# Patient Record
Sex: FEMALE | Race: WHITE | NOT HISPANIC OR LATINO | Employment: OTHER
[De-identification: names, ages, dates, MRNs, and addresses within clinical notes are randomized per-mention and may not be internally consistent; named-entity substitution may affect disease eponyms.]

---

## 2023-04-05 PROBLEM — B96.20 E. COLI UTI: Status: ACTIVE | Noted: 2021-11-05

## 2023-04-05 PROBLEM — D21.9 FIBROIDS: Status: ACTIVE | Noted: 2023-04-05

## 2023-04-05 PROBLEM — L40.9 PSORIASIS: Status: ACTIVE | Noted: 2017-08-23

## 2023-04-05 PROBLEM — E03.9 HYPOTHYROIDISM: Status: ACTIVE | Noted: 2023-04-05

## 2023-04-05 PROBLEM — J30.2 SEASONAL ALLERGIES: Status: ACTIVE | Noted: 2017-09-16

## 2023-04-05 PROBLEM — N39.0 E. COLI UTI: Status: ACTIVE | Noted: 2021-11-05

## 2023-04-05 PROBLEM — F33.3 SEVERE EPISODE OF RECURRENT MAJOR DEPRESSIVE DISORDER, WITH PSYCHOTIC FEATURES (H): Status: ACTIVE | Noted: 2022-05-03

## 2023-04-05 PROBLEM — G20.A1 PARKINSON'S DISEASE (H): Status: ACTIVE | Noted: 2022-09-19

## 2023-04-05 PROBLEM — D22.9 ATYPICAL NEVUS: Status: ACTIVE | Noted: 2023-04-05

## 2023-04-05 RX ORDER — BUPROPION HYDROCHLORIDE 300 MG/1
300 TABLET ORAL DAILY
COMMUNITY
Start: 2022-04-25 | End: 2023-11-28

## 2023-04-05 RX ORDER — LEVOTHYROXINE SODIUM 75 UG/1
1 TABLET ORAL DAILY
COMMUNITY
Start: 2022-09-30

## 2023-04-05 RX ORDER — LITHIUM CARBONATE 600 MG/1
600 CAPSULE ORAL AT BEDTIME
COMMUNITY
Start: 2022-03-24 | End: 2023-05-23

## 2023-04-05 RX ORDER — CARBIDOPA AND LEVODOPA 25; 100 MG/1; MG/1
TABLET ORAL
COMMUNITY
Start: 2022-08-25 | End: 2023-05-23

## 2023-04-06 ENCOUNTER — TRANSCRIBE ORDERS (OUTPATIENT)
Dept: OTHER | Age: 63
End: 2023-04-06

## 2023-04-06 DIAGNOSIS — G20.A1 PARKINSON'S DISEASE (H): Primary | ICD-10-CM

## 2023-04-20 NOTE — TELEPHONE ENCOUNTER
Action 4/20/23 MV 2.21pm   Action Taken 1) imaging request faxed to [facility]  2) records and imaging request faxed to [facility]     Action 5/10/23 MV 11.11am   Action Taken 1) [facility] images resolved in PACS ; records sent to scanning.  2) 2nd request faxed to [facility] for imaging     Action 5/16/23 MV 9.16am   Action Taken [facility] images resolved in PACS             RECORDS RECEIVED FROM: external   REASON FOR VISIT: PD   Date of Appt: 5/23/23   NOTES (FOR ALL VISITS) STATUS DETAILS   OFFICE NOTE from referring provider Care Everywhere Dr Bibi Yeh @ [facility]:  4/3/23   OFFICE NOTE from other specialist Received Livier MANZANARES @ [facility]:  4/12/23 1/12/23    Lexis MANZANARES @ [facility]:  3/8/23  12/28/22  9/28/22    Dr Negro Rust @ [facility]:  11/3/22    Dr Adelaida Arndt @ [facility]:  8/25/22   MEDICATION LIST Care Everywhere    IMAGING  (FOR ALL VISITS)     LANE SCAN PACS [facility]:  NM Brain 3/31/23   MRI (HEAD, NECK, SPINE) PACS [facility]:  MRI Brain 8/30/22  MRI Cervical Spine 8/30/22

## 2023-05-04 NOTE — PROGRESS NOTES
Diagnosis/Summary/Recommendations:    PATIENT: Fabiana Hu  63 year old female     : 1960    DEN: May 23, 2023    MRN: [id number]     [facility] [id number]     [phone] (H)   [phone] (M)     [email or web address]    Brandt hu spouse  [phone]    Referral from PCP   # possible Parkinson's; followed by neurology  She is currently at 1/2 tab daily and will follow-up with neurology.     Seen at [facility]    Assessment:  (G20) Parkinsonism, unspecified Parkinsonism type (H)  (primary encounter diagnosis)  No family history of parkinson     3/31/2023 dopamine scan (datscan) - not sure what medication was taking   Decreased radiotracer uptake in the caudate nuclei/putamina suspicious for a dopaminergic degenerative process such as Parkinson's disease.     Review of diagnosis    Parkinsonism  Diagnosed 2022  Presumed left side onset and has left arm now swinging     Avoidance of dopamine blockers   Not taking    Motor complication review   Has involuntary leg movements - that have begun since been on sinemet    Review of Impulse control disorders   Denies     Review of surgical or medication options   reviewed    Gait/Balance/Falls   Fell out bed  No falls otherwise    Exercise/Therapy performed/offered   Physical therapy for shoulder   Did big therapy at [facility]   Did some speech therapy at [facility]/Driving   [facility]  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 -   No changes in cognition  Nervous about driving     Mood   Depression  Anxiety  Diagnosed in [place] when was living there for 3  Years 1998  Diagnosed   Has incident of it then in  - not hospitalized   Had incident of it  - not hospitalized  Never suicidal   Tried a lot of antidepressant  Been on lithium for a long time   Brother Scar has depression and done well with lithium  Daughter diagnosed with bipolar  and is on lithium and doing okay  "- getting  this fall   Mood is stable  Sister is a nurse in [employer] [place]     Spouse is retired and did software development  He now plays the [employer]    Psychiatry -   [facility] - [facility] [place]/SLP      Hallucinations/delusions   No     Sleep   Sleep is \"horrible\"  Fall asleep okay   Has belching problem with tablets  Goes to bed around 1030pm   Wake up every 2 hours and has to go to the bathroom.  Nocturia every 2 hours - 3-4 times per night.   Talking in her sleep  Taking long acting melatonin   Has not been on melatonin very long  Not clear how long it has helped but it may have helped.   Sleep study was done at [facility] and diagnosed with  RBD  Had mild sleep apnea and tried cpap 5 years and not using this.   Has some creepy crawling feeling in bed when going to bed  Has some movement of legs to relieve symptoms  Not sure if there is PLMS - no leg jerking  Using gabapentin and melatonin for the dream enactment behavior    Bladder/Renal/Prostate/Gyn/Other  E coli uti  Urinary incontinence sees specialist  Wearing a pad - it has been really bad   Urinary urgency  No significant dribbling or stress urinary incontinence.   Urinary frequency at night  Small volumes  Empty out.   Has not been on any treatment.   Was given sample of gemtesa which she has not tried.   Has drinking a lot of water    GI/Constipation/GERD   Adenomas  Constipation has improved due to eating brown bread  Esophageal dysmotility and has not had motility study or an EGD  Has not had medications for this   h pylori diagnosed by Gi person and given antibiotic and tested afterwards  Using tums as needed       ENDO/Lipid/DM/Bone density/Thyroid  Hypothyroidism  Denies cholesterol or diabetes    Cardio/heart/Hyper or Hypotensive   AVM in right leg   Varicose veins  No blood pressure issue   No fainting or passing    Vision/Dry Eyes/Cataracts/Glaucoma/Macular   Wears glasses  Had cataract surgery bilateral "     Heme/Anticoagulation/Antiplatelet/Anemia/Other  Not taking aspirin    ENT/Resp  Seasonal allergies  Smell loss for a long time  Drooling in the past  Has not had covid     Skin/Cancer/Seborrhea/other  Atypical nevus  Psoriasis   Lesions removed but not clear if cancer    Musculoskeletal/Pain/Headache  Has back pain and going for physical therapy  Has done big therapy and developed shoulder pain - left shoulder  Having right shoulder pain and going for physical therapy  Has some back pain    Other:  Fibroids  Breast lumps/mass  Had biopsy 2012     Medications     730 8/830a 1130 430p 9p 930p 10p   Bupropion Wellbutrin XL 300mg 24hr  1        Carbidopa/levodopa Sinemet 25/100 1  1 1      Gabapentin neurontin 100mg       1   LEvothyroxine synthroid levothyroid 75mcg 1         Lithium eskalith 300mg     2     Melatonin 10mg extended release (10mg B6)      1                                                                                                                                                            Plan:    Pharmacy (MTM) consultation and medication management  Please call the scheduling number I@ [phone] to set up an appointment with pharmacists Yue Mahajan or Yancy Wright.     Denys Stahl    Josea was expensive and did not try  Has not been tried on mirabegron (myrbetriq) 25mg daily    Genetic testing  [email or web address]  PDgeneration -   [facility]ae     1. Would start with improving the neurogenic bladder issues - trial of mirabegron or other options    2. Sinemet dose in evening or during the night - using the short acting 1/2 tablet or trying long acting    3. Clean up timing of night time medication    4. Ongoing esophageal dysmotility/swallowing problems. - EGD or motility    5. Early discussion about deep brain stimulation (DBS) because of her dyskinesias in her legs that are occurring early in the course of disease.     6. Trial of amantadine and long acting amantadine  (Gocovri)    7. Neuropsychological baseline evaluation    8. Pharmacy consultation as noted above.     9. Genetics consultation with Leehyun Lewis     10. Return to see Radha Keene NP to get baseline motor evaluation.       Coding statement:   Medical Decision Making:  #  Chronic progressive medical conditions addressed  - see above --   Review and/or interpretation of unique test or documentation from a provider outside of neurology no   Independent historian provided additional details  yes  Prescription drug management and review of potential side effects and/or monitoring for side effects  -- see above ---  Health impacted by social determinants of health  no    I have reviewed the note as documented above.  This accurately captures the substance of my conversation with the patient and total time spent preparing for visit, executing visit and completing visit on the day of the visit:  70 minutes.  The portion of this total time included face to face time 62 minutes     Ramon Weston MD     ______________________________________    Last visit date and details:     Shameka aly     MV    4/20/23  2:22 PM  Note  Action 4/20/23 MV 2.21pm   Action Taken 1) imaging [facility]  2) [facility]                 RECORDS RECEIVED FROM: external   REASON FOR VISIT: PD   Date of Appt: 5/23/23   NOTES (FOR ALL VISITS) STATUS DETAILS   OFFICE NOTE from referring provider Care Everywhere Dr Bibi Yeh @ [facility]:  4/3/23   OFFICE NOTE from other specialist In process Lexis MANZANARES @ [facility]:  3/8/23  12/28/22  9/28/22   MEDICATION LIST Care Everywhere     IMAGING  (FOR ALL VISITS)       LANE SCAN In process [facility]:  NM Brain 3/31/23   MRI (HEAD, NECK, SPINE) In process [facility]:                       ______________________________________      Patient was asked about 14 Review of systems including changes in vision (dry eyes, double vision), hearing, heart, lungs, musculoskeletal,  depression, anxiety, snoring, RBD, insomnia, urinary frequency, urinary urgency, constipation, swallowing problems, hematological, ID, allergies, skin problems: seborrhea, endocrinological: thyroid, diabetes, cholesterol; balance, weight changes, and other neurological problems and these were not significant at this time except for   Patient Active Problem List   Diagnosis     Arteriovenous malformation (AVM)     Atypical nevus     Chest pain, unspecified     Colon adenomas     E. coli UTI     Hypothyroidism     Fibroids     Lump or mass in breast     Major depressive disorder, recurrent episode (H)     Parkinson's disease (H)     Psoriasis     Seasonal allergies     Severe episode of recurrent major depressive disorder, with psychotic features (H)     Varicose veins of other specified sites        Not on File  No past surgical history on file.  No past medical history on file.  Social History     Socioeconomic History     Marital status:      Spouse name: Not on file     Number of children: Not on file     Years of education: Not on file     Highest education level: Not on file   Occupational History     Not on file   Tobacco Use     Smoking status: Not on file     Smokeless tobacco: Not on file   Substance and Sexual Activity     Alcohol use: Not on file     Drug use: Not on file     Sexual activity: Not on file   Other Topics Concern     Not on file   Social History Narrative     Not on file     Social Determinants of Health     Financial Resource Strain: Not on file   Food Insecurity: Not on file   Transportation Needs: Not on file   Physical Activity: Not on file   Stress: Not on file   Social Connections: Not on file   Intimate Partner Violence: Not on file   Housing Stability: Not on file       Drug and lactation database from the [place] National Library of Medicine:  http://toxnet.nlm.nih.gov/cgi-bin/sis/htmlgen?LACT      B/P: Data Unavailable, T: Data Unavailable, P: Data Unavailable, R: Data  Unavailable 0 lbs 0 oz  There were no vitals taken for this visit., There is no height or weight on file to calculate BMI.  Medications and Vitals not listed above were documented in the cart and reviewed by me.     Current Outpatient Medications   Medication Sig Dispense Refill     buPROPion (WELLBUTRIN XL) 300 MG 24 hr tablet Take 300 mg by mouth daily       carbidopa-levodopa (SINEMET)  MG tablet TAKE 1/2 TABLET BY MOUTH THREE TIMES A DAY FOR A WEEK, THEN INCREASE TO 1 TAB THREE TIMES DAILY       levothyroxine (SYNTHROID/LEVOTHROID) 75 MCG tablet Take 1 tablet by mouth daily       lithium (ESKALITH) 600 MG capsule Take 600 mg by mouth At Bedtime           Ramon Weston MD

## 2023-05-07 PROBLEM — R32 URINARY INCONTINENCE: Status: ACTIVE | Noted: 2023-05-07

## 2023-05-23 ENCOUNTER — OFFICE VISIT (OUTPATIENT)
Dept: NEUROLOGY | Facility: CLINIC | Age: 63
End: 2023-05-23
Attending: FAMILY MEDICINE
Payer: MEDICARE

## 2023-05-23 ENCOUNTER — PRE VISIT (OUTPATIENT)
Dept: NEUROLOGY | Facility: CLINIC | Age: 63
End: 2023-05-23

## 2023-05-23 VITALS
DIASTOLIC BLOOD PRESSURE: 67 MMHG | HEART RATE: 63 BPM | OXYGEN SATURATION: 97 % | SYSTOLIC BLOOD PRESSURE: 102 MMHG | WEIGHT: 125 LBS

## 2023-05-23 DIAGNOSIS — G20.C PARKINSONISM, UNSPECIFIED PARKINSONISM TYPE (H): Primary | ICD-10-CM

## 2023-05-23 DIAGNOSIS — R39.9 LOWER URINARY TRACT SYMPTOMS: ICD-10-CM

## 2023-05-23 DIAGNOSIS — G20.A1 PARKINSON'S DISEASE (H): ICD-10-CM

## 2023-05-23 PROBLEM — F32.9 MAJOR DEPRESSIVE DISORDER: Status: ACTIVE | Noted: 2022-12-16

## 2023-05-23 PROBLEM — J30.2 SEASONAL ALLERGIC RHINITIS: Status: ACTIVE | Noted: 2022-12-16

## 2023-05-23 PROBLEM — D12.6 ADENOMATOUS POLYP OF COLON: Status: ACTIVE | Noted: 2022-12-16

## 2023-05-23 PROCEDURE — 99205 OFFICE O/P NEW HI 60 MIN: CPT | Performed by: PSYCHIATRY & NEUROLOGY

## 2023-05-23 RX ORDER — BUPROPION HYDROCHLORIDE 150 MG/1
TABLET ORAL
COMMUNITY
Start: 2023-03-30 | End: 2023-05-23

## 2023-05-23 RX ORDER — MIRABEGRON 25 MG/1
25 TABLET, FILM COATED, EXTENDED RELEASE ORAL DAILY
Qty: 30 TABLET | Refills: 11 | Status: SHIPPED | OUTPATIENT
Start: 2023-05-23 | End: 2023-11-28

## 2023-05-23 RX ORDER — GABAPENTIN 100 MG/1
CAPSULE ORAL
COMMUNITY
Start: 2023-04-12 | End: 2023-11-28

## 2023-05-23 RX ORDER — CARBIDOPA AND LEVODOPA 25; 100 MG/1; MG/1
1 TABLET ORAL 3 TIMES DAILY
COMMUNITY
Start: 2023-05-23 | End: 2023-06-01

## 2023-05-23 RX ORDER — LITHIUM CARBONATE 300 MG
150 TABLET ORAL AT BEDTIME
COMMUNITY
Start: 2023-04-04

## 2023-05-23 RX ORDER — PHENOL 1.4 %
10 AEROSOL, SPRAY (ML) MUCOUS MEMBRANE
COMMUNITY
End: 2024-01-05

## 2023-05-23 RX ORDER — CALCIUM CARBONATE 500 MG/1
1 TABLET, CHEWABLE ORAL DAILY PRN
COMMUNITY
Start: 2023-05-23 | End: 2023-08-28

## 2023-05-23 RX ORDER — NICOTINE POLACRILEX 4 MG/1
GUM, CHEWING ORAL
COMMUNITY
Start: 2022-08-01 | End: 2023-05-23

## 2023-05-23 ASSESSMENT — PAIN SCALES - GENERAL: PAINLEVEL: NO PAIN (0)

## 2023-05-23 NOTE — PATIENT INSTRUCTIONS
"  Assessment:  (G20) Parkinsonism, unspecified Parkinsonism type (H)  (primary encounter diagnosis)  No family history of parkinson     3/31/2023 dopamine scan (datscan) - not sure what medication was taking   Decreased radiotracer uptake in the caudate nuclei/putamina suspicious for a dopaminergic degenerative process such as Parkinson's disease.     Review of diagnosis    Parkinsonism  Diagnosed august 2022  Presumed left side onset and has left arm now swinging     Avoidance of dopamine blockers   Not taking    Motor complication review   Has involuntary leg movements - that have begun since been on sinemet    Review of Impulse control disorders   Denies     Review of surgical or medication options   reviewed    Gait/Balance/Falls   Fell out bed  No falls otherwise    Exercise/Therapy performed/offered   Physical therapy for shoulder   Did big therapy at [facility]   Did some speech therapy at [facility]/D[facility]   [facility]  [facility] [employer] -   No changes in cognition  Nervous about driving     Mood   Depression  Anxiety  Diagnosed in [place] when was living there for 3  Years 1998  Diagnosed 1999  Has incident of it then in 1999 - not hospitalized   Had incident of it 2010 - not hospitalized  Never suicidal   Tried a lot of antidepressant  Been on lithium for a long time   Brother Scar has depression and done well with lithium  Daughter diagnosed with bipolar 2014 and is on lithium and doing okay - getting  this fall   Mood is stable  Sister is a nurse in [employer]     Spouse is retired and did software development  He now plays the [employer]    [facility] [place]      Hallucinations/delusions   No     Sleep   Sleep is \"horrible\"  Fall asleep okay   Has belching problem with tablets  Goes to bed around 1030pm   Wake up every 2 hours and has to go to the bathroom.  Nocturia every 2 hours " - 3-4 times per night.   Talking in her sleep  Taking long acting melatonin   Has not been on melatonin very long  Not clear how long it has helped but it may have helped.   Sleep study was done at [facility] and diagnosed with  RBD  Had mild sleep apnea and tried cpap 5 years and not using this.   Has some creepy crawling feeling in bed when going to bed  Has some movement of legs to relieve symptoms  Not sure if there is PLMS - no leg jerking  Using gabapentin and melatonin for the dream enactment behavior    Bladder/Renal/Prostate/Gyn/Other  E coli uti  Urinary incontinence sees specialist  Wearing a pad - it has been really bad   Urinary urgency  No significant dribbling or stress urinary incontinence.   Urinary frequency at night  Small volumes  Empty out.   Has not been on any treatment.   Was given sample of gemtesa which she has not tried.   Has drinking a lot of water    GI/Constipation/GERD   Adenomas  Constipation has improved due to eating brown bread  Esophageal dysmotility and has not had motility study or an EGD  Has not had medications for this   h pylori diagnosed by Gi person and given antibiotic and tested afterwards  Using tums as needed       ENDO/Lipid/DM/Bone density/Thyroid  Hypothyroidism  Denies cholesterol or diabetes    Cardio/heart/Hyper or Hypotensive   AVM in right leg   Varicose veins  No blood pressure issue   No fainting or passing    Vision/Dry Eyes/Cataracts/Glaucoma/Macular   Wears glasses  Had cataract surgery bilateral     Heme/Anticoagulation/Antiplatelet/Anemia/Other  Not taking aspirin    ENT/Resp  Seasonal allergies  Smell loss for a long time  Drooling in the past  Has not had covid     Skin/Cancer/Seborrhea/other  Atypical nevus  Psoriasis   Lesions removed but not clear if cancer    Musculoskeletal/Pain/Headache  Has back pain and going for physical therapy  Has done big therapy and developed shoulder pain - left shoulder  Having right shoulder pain and going for  physical therapy  Has some back pain    Other:  Fibroids  Breast lumps/mass  Had biopsy 2012     Medications     730 8/830a 1130 430p 9p 930p 10p   Bupropion Wellbutrin XL 300mg 24hr  1        Carbidopa/levodopa Sinemet 25/100 1  1 1      Gabapentin neurontin 100mg       1   LEvothyroxine synthroid levothyroid 75mcg 1         Lithium eskalith 300mg     2     Melatonin 10mg extended release (10mg B6)      1                                                                                                                                                            Plan:    Pharmacy (MTM) consultation and medication management  Please call the scheduling number I@ [phone] to set up an appointment with pharmacists Yue Mahajan or Yancy Wright.     Denys Garciaa was expensive and did not try  Has not been tried on mirabegron (myrbetriq) 25mg daily    Genetic testing  [email or web address]  PDgeneration -   invitae     1. Would start with improving the neurogenic bladder issues - trial of mirabegron or other options    2. Sinemet dose in evening or during the night - using the short acting 1/2 tablet or trying long acting    3. Clean up timing of night time medication    4. Ongoing esophageal dysmotility/swallowing problems. - EGD or motility    5. Early discussion about deep brain stimulation (DBS) because of her dyskinesias in her legs that are occurring early in the course of disease.     6. Trial of amantadine and long acting amantadine (Gocovri)    7. Neuropsychological baseline evaluation    8. Pharmacy consultation as noted above.     9. Genetics consultation with Lee Lewis     10. Return to see Radha Keene NP to get baseline motor evaluation.

## 2023-05-23 NOTE — LETTER
2023       RE: Fabiana Hu   [address]  [facility] [address]     Dear Colleague,    Thank you for referring your patient, Fabiana Hu, to the [facility] NEUROLOGY CLINIC [place] at [facility]. Please see a copy of my visit note below.          Diagnosis/Summary/Recommendations:    PATIENT: Fabiana Hu  63 year old female     : 1960    DEN: May 23, 2023    MRN: [id number]     W ISAC SCHNEIDER   [facility] [address]     [phone] ()   [phone] ()     [email or web address]    Brandt hu spouse  [phone]    Referral from PCP   # possible Parkinson's; followed by neurology  She is currently at 1/2 tab daily and will follow-up with neurology.     Seen at [facility]    Assessment:  (G20) Parkinsonism, unspecified Parkinsonism type (H)  (primary encounter diagnosis)  No family history of parkinson     3/31/2023 dopamine scan (datscan) - not sure what medication was taking   Decreased radiotracer uptake in the caudate nuclei/putamina suspicious for a dopaminergic degenerative process such as Parkinson's disease.     Review of diagnosis    Parkinsonism  Diagnosed 2022  Presumed left side onset and has left arm now swinging     Avoidance of dopamine blockers   Not taking    Motor complication review   Has involuntary leg movements - that have begun since been on sinemet    Review of Impulse control disorders   Denies     Review of surgical or medication options   reviewed    Gait/Balance/Falls   Fell out bed  No falls otherwise    Exercise/Therapy performed/offered   Physical therapy for shoulder   Did big therapy at [facility]   Did some speech therapy at [place]     Cognitive/Driving   [place]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 -   No changes in cognition  Nervous about driving     Mood   Depression  Anxiety  Diagnosed in [place] when was living there for 3  Years   Diagnosed  "1999  Has incident of it then in 1999 - not hospitalized   Had incident of it 2010 - not hospitalized  Never suicidal   Tried a lot of antidepressant  Been on lithium for a long time   Brother Scar has depression and done well with lithium  Daughter diagnosed with bipolar 2014 and is on lithium and doing okay - getting  this fall   Mood is stable  Sister is a nurse in [employer]     Spouse is retired and did software development  He now plays the [employer]    Psychiatry -   [facility]      Hallucinations/delusions   No     Sleep   Sleep is \"horrible\"  Fall asleep okay   Has belching problem with tablets  Goes to bed around 1030pm   Wake up every 2 hours and has to go to the bathroom.  Nocturia every 2 hours - 3-4 times per night.   Talking in her sleep  Taking long acting melatonin   Has not been on melatonin very long  Not clear how long it has helped but it may have helped.   Sleep study was done at [facility] and diagnosed with  RBD  Had mild sleep apnea and tried cpap 5 years and not using this.   Has some creepy crawling feeling in bed when going to bed  Has some movement of legs to relieve symptoms  Not sure if there is PLMS - no leg jerking  Using gabapentin and melatonin for the dream enactment behavior    Bladder/Renal/Prostate/Gyn/Other  E coli uti  Urinary incontinence sees specialist  Wearing a pad - it has been really bad   Urinary urgency  No significant dribbling or stress urinary incontinence.   Urinary frequency at night  Small volumes  Empty out.   Has not been on any treatment.   Was given sample of gemtesa which she has not tried.   Has drinking a lot of water    GI/Constipation/GERD   Adenomas  Constipation has improved due to eating brown bread  Esophageal dysmotility and has not had motility study or an EGD  Has not had medications for this   h pylori diagnosed by Gi person and given antibiotic and tested afterwards  Using tums as " needed       ENDO/Lipid/DM/Bone density/Thyroid  Hypothyroidism  Denies cholesterol or diabetes    Cardio/heart/Hyper or Hypotensive   AVM in right leg   Varicose veins  No blood pressure issue   No fainting or passing    Vision/Dry Eyes/Cataracts/Glaucoma/Macular   Wears glasses  Had cataract surgery bilateral     Heme/Anticoagulation/Antiplatelet/Anemia/Other  Not taking aspirin    ENT/Resp  Seasonal allergies  Smell loss for a long time  Drooling in the past  Has not had covid     Skin/Cancer/Seborrhea/other  Atypical nevus  Psoriasis   Lesions removed but not clear if cancer    Musculoskeletal/Pain/Headache  Has back pain and going for physical therapy  Has done big therapy and developed shoulder pain - left shoulder  Having right shoulder pain and going for physical therapy  Has some back pain    Other:  Fibroids  Breast lumps/mass  Had biopsy 2012     Medications     730 8/830a 1130 430p 9p 930p 10p   Bupropion Wellbutrin XL 300mg 24hr  1        Carbidopa/levodopa Sinemet 25/100 1  1 1      Gabapentin neurontin 100mg       1   LEvothyroxine synthroid levothyroid 75mcg 1         Lithium eskalith 300mg     2     Melatonin 10mg extended release (10mg B6)      1                                                                                                                                                            Plan:    Pharmacy (MTM) consultation and medication management  Please call the scheduling number I@ [phone] to set up an appointment with pharmacists Yue Mahajan or Yancy Wright.     Denys Roblesjessicaa was expensive and did not try  Has not been tried on mirabegron (myrbetriq) 25mg daily    Genetic testing  [email or web address]  PDgeneration -   invitae     1. Would start with improving the neurogenic bladder issues - trial of mirabegron or other options    2. Sinemet dose in evening or during the night - using the short acting 1/2 tablet or trying long acting    3. Clean  up timing of night time medication    4. Ongoing esophageal dysmotility/swallowing problems. - EGD or motility    5. Early discussion about deep brain stimulation (DBS) because of her dyskinesias in her legs that are occurring early in the course of disease.     6. Trial of amantadine and long acting amantadine (Gocovri)    7. Neuropsychological baseline evaluation    8. Pharmacy consultation as noted above.     9. Genetics consultation with Lee Lewis     10. Return to see Radha Keene NP to get baseline motor evaluation.       Coding statement:   Medical Decision Making:  #  Chronic progressive medical conditions addressed  - see above --   Review and/or interpretation of unique test or documentation from a provider outside of neurology no   Independent historian provided additional details  yes  Prescription drug management and review of potential side effects and/or monitoring for side effects  -- see above ---  Health impacted by social determinants of health  no    I have reviewed the note as documented above.  This accurately captures the substance of my conversation with the patient and total time spent preparing for visit, executing visit and completing visit on the day of the visit:  70 minutes.  The portion of this total time included face to face time 62 minutes     Ramon Weston MD     ______________________________________    Last visit date and details:     Shameka aly    4/20/23  2:22 PM  Note  Action 4/20/23 CRISTI 2.21pm   Action Taken 1) imaging request faxed to [facility]  2) records and imaging request faxed to [facility]                 RECORDS RECEIVED FROM: external   REASON FOR VISIT: PD   Date of Appt: 5/23/23   NOTES (FOR ALL VISITS) STATUS DETAILS   OFFICE NOTE from referring provider Care Everywhere Dr Bibi Yeh @ [facility]:  4/3/23   OFFICE NOTE from other specialist In process Lexis MANZANARES @ [facility]:  3/8/23  12/28/22  9/28/22   MEDICATION LIST Care Everywhere     IMAGING  (FOR ALL  VISITS)       LANE SCAN In process [facility]:  NM Brain 3/31/23   MRI (HEAD, NECK, SPINE) In process [facility]:                       ______________________________________      Patient was asked about 14 Review of systems including changes in vision (dry eyes, double vision), hearing, heart, lungs, musculoskeletal, depression, anxiety, snoring, RBD, insomnia, urinary frequency, urinary urgency, constipation, swallowing problems, hematological, ID, allergies, skin problems: seborrhea, endocrinological: thyroid, diabetes, cholesterol; balance, weight changes, and other neurological problems and these were not significant at this time except for   Patient Active Problem List   Diagnosis    Arteriovenous malformation (AVM)    Atypical nevus    Chest pain, unspecified    Colon adenomas    E. coli UTI    Hypothyroidism    Fibroids    Lump or mass in breast    Major depressive disorder, recurrent episode (H)    Parkinson's disease (H)    Psoriasis    Seasonal allergies    Severe episode of recurrent major depressive disorder, with psychotic features (H)    Varicose veins of other specified sites        Not on File  No past surgical history on file.  No past medical history on file.  Social History     Socioeconomic History    Marital status:      Spouse name: Not on file    Number of children: Not on file    Years of education: Not on file    Highest education level: Not on file   Occupational History    Not on file   Tobacco Use    Smoking status: Not on file    Smokeless tobacco: Not on file   Substance and Sexual Activity    Alcohol use: Not on file    Drug use: Not on file    Sexual activity: Not on file   Other Topics Concern    Not on file   Social History Narrative    Not on file     Social Determinants of Health     Financial Resource Strain: Not on file   Food Insecurity: Not on file   Transportation Needs: Not on file   Physical Activity: Not on file   Stress: Not on file   Social Connections: Not on file    Intimate Partner Violence: Not on file   Housing Stability: Not on file       Drug and lactation database from the [place] National Library of Medicine:  http://toxnet.nlm.nih.gov/cgi-bin/sis/htmlgen?LACT      B/P: Data Unavailable, T: Data Unavailable, P: Data Unavailable, R: Data Unavailable 0 lbs 0 oz  There were no vitals taken for this visit., There is no height or weight on file to calculate BMI.  Medications and Vitals not listed above were documented in the cart and reviewed by me.     Current Outpatient Medications   Medication Sig Dispense Refill    buPROPion (WELLBUTRIN XL) 300 MG 24 hr tablet Take 300 mg by mouth daily      carbidopa-levodopa (SINEMET)  MG tablet TAKE 1/2 TABLET BY MOUTH THREE TIMES A DAY FOR A WEEK, THEN INCREASE TO 1 TAB THREE TIMES DAILY      levothyroxine (SYNTHROID/LEVOTHROID) 75 MCG tablet Take 1 tablet by mouth daily      lithium (ESKALITH) 600 MG capsule Take 600 mg by mouth At Bedtime           Ramon Weston MD          Again, thank you for allowing me to participate in the care of your patient.      Sincerely,    Ramon Weston MD

## 2023-05-24 ENCOUNTER — TELEPHONE (OUTPATIENT)
Dept: LAB | Facility: CLINIC | Age: 63
End: 2023-05-24
Payer: MEDICARE

## 2023-05-24 NOTE — PROGRESS NOTES
GENETIC COUNSELING-Neurology  Sent [employer] message offering genetic consult times at the request of Dr. Weston.    Lee Lewis MS, [facility]  Certified Genetic Counselor

## 2023-06-01 ENCOUNTER — VIRTUAL VISIT (OUTPATIENT)
Dept: PHARMACY | Facility: CLINIC | Age: 63
End: 2023-06-01
Attending: PSYCHIATRY & NEUROLOGY
Payer: COMMERCIAL

## 2023-06-01 DIAGNOSIS — G20.C PARKINSONISM, UNSPECIFIED PARKINSONISM TYPE (H): Primary | ICD-10-CM

## 2023-06-01 PROCEDURE — 99207 PR NO CHARGE LOS: CPT | Mod: VID | Performed by: PHARMACIST

## 2023-06-01 RX ORDER — CARBIDOPA AND LEVODOPA 25; 100 MG/1; MG/1
TABLET ORAL
Qty: 315 TABLET | Refills: 1 | Status: SHIPPED | OUTPATIENT
Start: 2023-06-01 | End: 2023-11-28

## 2023-06-01 NOTE — PROGRESS NOTES
Disease State Management Encounter:                          Fabiana Hu is a 63 year old female contacted via secure video for for an initial visit. She was referred to me from Dr. Weston, Neurology.      Reason for visit: med check     Medication Adherence/Access: no issues reported    Parkinsonism: Pt is currently taking carbidopa/levodopa 25/100mg three times daily (7:30am, 11:30am, 4:30pm).  She described having pretty consistent effect from the medication during the day and doesn't notice on/off time. She thinks it has been helpful for shakiness/tremor.   She does notice a muscle jerk in her left leg at rest, especially in the evening but wonders if she is just noticing it more around that time of day.  She had previously tried half tablet three times daily with a previous provider and found that she had more stiffness.  She usually goes to bed around 10:30pm and sleeps pretty well.  Doesn't notice the jerk in her leg overnight. Is diagnosed with restless legs, but gabapentin is helpful for that and does not have issues.    Today's Vitals: There were no vitals taken for this visit.    Assessment:    Based on timing, dyskinesia in the evening could be related to carbidopa/levodopa wearing off, though she doesn't usually notice any other wearing off throughout the day. She may benefit from a small dose in the evening to see if it improves.    Plan:  - add in one half tablet of carbidopa/levodopa at about 7:30pm    Follow-up: 6/29/23    I spent 20 minutes with this patient today. All changes were made via collaborative practice agreement with Ramon Weston. A copy of the visit note was provided to the patient's provider(s).    A summary of these recommendations was given to the patient.    Yancy Wright, PharmD  Medication Therapy Management Pharmacist  [facility]     Medication Therapy Recommendations  Parkinsonism, unspecified Parkinsonism type (H)    Current  Medication: carbidopa-levodopa (SINEMET)  MG tablet   Rationale: Dose too low - Dosage too low - Effectiveness   Recommendation: Increase Dose - add 0.5 tablet at 7:30pm   Status: Accepted per CPA

## 2023-06-01 NOTE — PATIENT INSTRUCTIONS
Recommendations from today's disease management visit:                                                      - Continue taking carbidopa/levodopa 25/100mg-- one tablet at each of 7:30am, 11:30am, 4:30pm. Add in one half tablet of carbidopa/levodopa at about 7:30pm    Follow-up: 6/29/23    To schedule another MTM appointment, please call the clinic directly or you may call the MTM scheduling line at [phone] or toll-free at [phone].     My Clinical Pharmacist's contact information:                                                      Please feel free to contact me with any questions or concerns you have.      Yancy Wright, PharmD  Medication Therapy Management Pharmacist  [facility]

## 2023-06-02 ENCOUNTER — VIRTUAL VISIT (OUTPATIENT)
Dept: NEUROLOGY | Facility: CLINIC | Age: 63
End: 2023-06-02
Payer: MEDICARE

## 2023-06-02 DIAGNOSIS — G20.C PARKINSONISM, UNSPECIFIED PARKINSONISM TYPE (H): ICD-10-CM

## 2023-06-02 PROCEDURE — 99207 PR BUNDLED PROCEDURE IN GLOBAL PKG: CPT | Mod: VID | Performed by: GENETIC COUNSELOR, MS

## 2023-06-02 NOTE — LETTER
6/2/2023       RE: Fabiana Hu  [address]  [facility] [address]     Dear Colleague,    Thank you for referring your patient, Fabiana Hu, to the [facility]UROLOGY [facility]. Please see a copy of my visit note below.    Fabiana is a 63 year old who is being evaluated via a billable video visit.      How would you like to obtain your AVS?mychart  If the video visit is dropped, the invitation should be resent by:cell  Will anyone else be joining your video visit? no      Video-Visit Details    Type of service:  Video Visit   Video Start Time: 9:30 AM  Video End Time:10:10 AM    Originating Location (pt. Location): Home    Distant Location (provider location):  Onsite  Platform used for Video Visit: magdalena    GENETIC COUNSELING-Neurology  Fabiana Hu was seen today for genetic consultation due to a personal history of Parkinson disease. I spent a total of 40 minutes with the patient with the majority of the time being spent on genetic counseling and testing options. The patient was previously diagnosed with PD by Dr. Weston.     MEDICAL HISTORY  Please see Dr. Weston's notes for a more thorough summary of medical history. Briefly, Fabiana was diagnosed with PD at age 62 by Dr. Weston.     FAMILY HISTORY-see scanned pedigree  Fabiana reported no other family history of neurologic disease in a 4 generation pedigree.     GENETIC COUNSELING  We discussed the genetic and environmental basis of Parkinson disease. I explained that in most cases, it results from complex and lifelong interaction of multiple genetic and environmental factors. In some cases, there may be a single gene cause.  I explained that the presence of other family members with Parkinson disease and/or young age of onset are often clues for a genetic basis.    We discussed rare Mendelian forms of Parkinson disease. With these forms of Parkinson disease, a single genetic change (or a  pair of changes) is sufficient to cause Parkinson disease.  I explained that there are some dominant forms (e.g. SNCA) and some recessive forms (e.g. PRKN, PINK1).  We discussed the implications of autosomal dominant and autosomal recessive patterns of inheritance in terms of risks to siblings and children.  If one of these types of Parkinson disease is identified, predictive and./or carrier testing would be available to family members.    In addition, there are some common autosomal dominant genetic 'risk factors' for Parkinson disease (e.g. GBA and LRRK2).  Mutations in these genes confer an increased susceptibility but not certainty for developing Parkinson disease. If we find one of these variations in a person with Parkinson disease, it may provide a partial explanation for why they have this condition. Testing for these variants in other asymptomatic relatives is a complex decision as these results cannot definitively determine whether or not an asymptomatic individual will develop Parkinson disease.     PLAN  Following our discussion today, Fabiana declined genetic testing.    Lee Lewis MS, [facility]  Certified Genetic Counselor  [facility] Neurology and Ataxia Clinics

## 2023-06-02 NOTE — PROGRESS NOTES
Fabiana is a 63 year old who is being evaluated via a billable video visit.      How would you like to obtain your AVS?mychart  If the video visit is dropped, the invitation should be resent by:cell  Will anyone else be joining your video visit? no      Video-Visit Details    Type of service:  Video Visit   Video Start Time: 9:30 AM  Video End Time:10:10 AM    Originating Location (pt. Location): Home    Distant Location (provider location):  Onsite  Platform used for Video Visit: well    GENETIC COUNSELING-Neurology  Fabiana Hu was seen today for genetic consultation due to a personal history of Parkinson disease. I spent a total of 40 minutes with the patient with the majority of the time being spent on genetic counseling and testing options. The patient was previously diagnosed with PD by Dr. Weston.     MEDICAL HISTORY  Please see Dr. Weston's notes for a more thorough summary of medical history. Briefly, Fabiana was diagnosed with PD at age 62 by Dr. Weston.     FAMILY HISTORY-see scanned pedigree  Fabiana reported no other family history of neurologic disease in a 4 generation pedigree.     GENETIC COUNSELING  We discussed the genetic and environmental basis of Parkinson disease. I explained that in most cases, it results from complex and lifelong interaction of multiple genetic and environmental factors. In some cases, there may be a single gene cause.  I explained that the presence of other family members with Parkinson disease and/or young age of onset are often clues for a genetic basis.    We discussed rare Mendelian forms of Parkinson disease. With these forms of Parkinson disease, a single genetic change (or a pair of changes) is sufficient to cause Parkinson disease.  I explained that there are some dominant forms (e.g. SNCA) and some recessive forms (e.g. PRKN, PINK1).  We discussed the implications of autosomal dominant and autosomal recessive patterns of inheritance in terms of risks to siblings and children.   If one of these types of Parkinson disease is identified, predictive and./or carrier testing would be available to family members.    In addition, there are some common autosomal dominant genetic 'risk factors' for Parkinson disease (e.g. GBA and LRRK2).  Mutations in these genes confer an increased susceptibility but not certainty for developing Parkinson disease. If we find one of these variations in a person with Parkinson disease, it may provide a partial explanation for why they have this condition. Testing for these variants in other asymptomatic relatives is a complex decision as these results cannot definitively determine whether or not an asymptomatic individual will develop Parkinson disease.     PLAN  Following our discussion today, Fabiana declined genetic testing.    Lee Lewis MS, [facility]  Certified Genetic Counselor  [facility] Adult Neurology and Ataxia Clinics

## 2023-06-09 ENCOUNTER — TELEPHONE (OUTPATIENT)
Dept: NEUROPSYCHOLOGY | Facility: CLINIC | Age: 63
End: 2023-06-09
Payer: MEDICARE

## 2023-06-09 NOTE — TELEPHONE ENCOUNTER
[facility] Call Center    Phone Message    May a detailed message be left on voicemail: yes     Reason for Call: Other: Patient is calling to schedule her neuropsych appointment. Please call back when available.      Action Taken: Other: Neuropsychology     Travel Screening: Not Applicable

## 2023-06-12 NOTE — TELEPHONE ENCOUNTER
Spoke with patient, scheduled on 1/16 at 8am with [place] Fern[place] [facility] wait list.     Sachin Gross on 6/12/2023 at 11:32 AM

## 2023-06-29 ENCOUNTER — VIRTUAL VISIT (OUTPATIENT)
Dept: PHARMACY | Facility: CLINIC | Age: 63
End: 2023-06-29
Payer: COMMERCIAL

## 2023-06-29 DIAGNOSIS — G20.C PARKINSONISM, UNSPECIFIED PARKINSONISM TYPE (H): Primary | ICD-10-CM

## 2023-06-29 PROCEDURE — 99207 PR NO CHARGE LOS: CPT | Mod: VID | Performed by: PHARMACIST

## 2023-06-29 NOTE — PATIENT INSTRUCTIONS
Recommendations from today's disease management visit:                                                      Continue current medications    Follow-up: I will send you a [employer] message to check in later this year. Please reach out sooner if needed    To schedule another MTM appointment, please call the clinic directly or you may call the MTM scheduling line at [phone] or toll-free at [phone].     My Clinical Pharmacist's contact information:                                                      Please feel free to contact me with any questions or concerns you have.      Yancy Wright, PharmD  Medication Therapy Management Pharmacist  [facility]

## 2023-06-29 NOTE — PROGRESS NOTES
Disease State Management Encounter:                          Fabiana Hu is a 63 year old female contacted via secure video for a follow-up visit.  Today's visit is a follow-up visit from 6/1/23.     Reason for visit: med check.     Medication Adherence/Access: no issues reported    Parkinsonism: Pt is currently taking carbidopa/levodopa 25/100mg three times daily (7:30am, 11:30am, 4:30pm) and 1/2 tab at 7:30pm (added at last visit)    Pt reported that she has often been forgetting the newly added dose at 7:30pm, taking it a hour or two later about 4 days per week. She thinks it might be helping some with the jerking in the evening, but not always consistent. She has not noticed any side effects since adding that dose and would like to continue.  She does not generally notice any tremor/shakiness or stiffness/difficulty with gait.  She does have some questions about sleep and plans to follow up with her Sleep Medicine provider.    Assessment:     Inconsistent effect with the newly added evening dose, though patient plans to continue taking it since it has provided some benefit.  Continue current medication.     Plan:  - continue add in one half tablet of carbidopa/levodopa at about 7:30pm    Follow-up: 3-6 months    I spent 15 minutes with this patient today. A copy of the visit note was provided to the patient's provider(s).    A summary of these recommendations was given to the patient.    Yancy Wright, [place]  Medication Therapy Management Pharmacist  [facility]iatry and Neurology Clinics         Medication Therapy Recommendations  No medication therapy recommendations to display

## 2023-07-13 ENCOUNTER — TRANSFERRED RECORDS (OUTPATIENT)
Dept: HEALTH INFORMATION MANAGEMENT | Facility: CLINIC | Age: 63
End: 2023-07-13
Payer: MEDICARE

## 2023-07-18 ENCOUNTER — DOCUMENTATION ONLY (OUTPATIENT)
Dept: NEUROLOGY | Facility: CLINIC | Age: 63
End: 2023-07-18
Payer: MEDICARE

## 2023-07-18 NOTE — PROGRESS NOTES
Received office note from [facility] Neurology   Records Date of service: 7/18/23  Copy has been sent to scanning and emailed to provider

## 2023-08-28 ENCOUNTER — OFFICE VISIT (OUTPATIENT)
Dept: NEUROLOGY | Facility: CLINIC | Age: 63
End: 2023-08-28
Payer: COMMERCIAL

## 2023-08-28 VITALS
WEIGHT: 123.5 LBS | HEART RATE: 64 BPM | SYSTOLIC BLOOD PRESSURE: 114 MMHG | DIASTOLIC BLOOD PRESSURE: 76 MMHG | OXYGEN SATURATION: 100 %

## 2023-08-28 DIAGNOSIS — K11.7 SIALORRHEA: Primary | ICD-10-CM

## 2023-08-28 DIAGNOSIS — G20.B1 DYSKINESIA DUE TO PARKINSON'S DISEASE (H): ICD-10-CM

## 2023-08-28 DIAGNOSIS — G20.A1 PARKINSON'S DISEASE (H): ICD-10-CM

## 2023-08-28 PROCEDURE — 99215 OFFICE O/P EST HI 40 MIN: CPT | Performed by: NURSE PRACTITIONER

## 2023-08-28 ASSESSMENT — UNIFIED PARKINSONS DISEASE RATING SCALE (UPDRS)
FINGER_TAPPING_LEFT: (0) NORMAL: NO PROBLEMS.
PRONATION_SUPINATION_LEFT: (2) MILD: ANY OF THE FOLLOWING: A) 3 TO 5 INTERRUPTIONS DURING TAPPING  B) MILD SLOWING  C) THE AMPLITUDE DECREMENTS MIDWAY IN THE 10-MOVEMENT SEQUENCE.
AXIAL_SCORE: 7
SPEECH: (1) SLIGHT: LOSS OF MODULATION, DICTION OR VOLUME, BUT STILL ALL WORDS EASY TO UNDERSTAND.
ARISING_CHAIR: (0) NORMAL: NO PROBLEMS. ABLE TO ARISE QUICKLY WITHOUT HESITATION.
TOETAPPING_LEFT: (1) SLIGHT: ANY OF THE FOLLOWING: A) THE REGULAR RHYTHM IS BROKEN WITH ONE WITH ONE OR TWO INTERRUPTIONS OR HESITATIONS OF THE MOVEMENT  B) SLIGHT SLOWING  C) THE AMPLITUDE DECREMENTS NEAR THE END OF THE 10 MOVEMENTS.
RIGIDITY_NECK: (2) MILD: RIGIDITY DETECTED WITHOUT THE ACTIVATION MANEUVER. FULL RANGE OF MOTION IS EASILY ACHIEVED.
RIGIDITY_RUE: (1) SLIGHT: RIGIDITY ONLY DETECTED WITH ACTIVATION MANEUVER.
RIGIDITY_LLE: (2) MILD: RIGIDITY DETECTED WITHOUT THE ACTIVATION MANEUVER. FULL RANGE OF MOTION IS EASILY ACHIEVED.
POSTURAL_STABILITY: (0) NORMAL: NO PROBLEMS. RECOVERS WITH ONE OR TWO STEPS.
FACIAL_EXPRESSION: (3) MASKED FACIES WITH LIPS PARTED SOME OF THE TIME WHEN THE MOUTH IS AT REST.
RIGIDITY_RLE: (1) SLIGHT: RIGIDITY ONLY DETECTED WITH ACTIVATION MANEUVER.
TOETAPPING_RIGHT: (0) NORMAL: NO PROBLEMS.
FINGER_TAPPING_RIGHT: (0) NORMAL: NO PROBLEMS.
TOTAL_SCORE: 4
AMPLITUDE_LLE: (0) NORMAL: NO TREMOR.
LEG_AGILITY_LEFT: (0) NORMAL: NO PROBLEMS.
AMPLITUDE_LIP_JAW: (0) NORMAL: NO TREMOR.
FREEZING_GAIT: (0) NORMAL: NO FREEZING.
GAIT: (1) SLIGHT: INDEPENDENT WALKING WITH MINOR GAIT IMPAIRMENT.
TOTAL_SCORE: 21
POSTURE: (0) NORMAL: NO PROBLEMS.
DYSKINESIAS_PRESENT: YES
AMPLITUDE_LUE: (0) NORMAL: NO TREMOR.
SPONTANEITY_OF_MOVEMENT: (0) NORMAL: NO PROBLEMS.
HANDMOVEMENTS_LEFT: (2) MILD: ANY OF THE FOLLOWING: A) 3 TO 5 INTERRUPTIONS DURING TAPPING  B) MILD SLOWING  C) THE AMPLITUDE DECREMENTS MIDWAY IN THE 10-MOVEMENT SEQUENCE.
HANDMOVEMENTS_RIGHT: (1) SLIGHT: ANY OF THE FOLLOWING: A) THE REGULAR RHYTHM IS BROKEN WITH ONE WITH ONE OR TWO INTERRUPTIONS OR HESITATIONS OF THE MOVEMENT  B) SLIGHT SLOWING  C) THE AMPLITUDE DECREMENTS NEAR THE END OF THE 10 MOVEMENTS.
MOVEMENTS_INTERFERE_WITH_RATINGS: NO
RIGIDITY_LUE: (2) MILD: RIGIDITY DETECTED WITHOUT THE ACTIVATION MANEUVER. FULL RANGE OF MOTION IS EASILY ACHIEVED.
CONSTANCY_TREMOR_ATREST: (0) NORMAL: NO TREMOR.
PRONATION_SUPINATION_RIGHT: (1) SLIGHT: ANY OF THE FOLLOWING: A) THE REGULAR RHYTHM IS BROKEN WITH ONE WITH ONE OR TWO INTERRUPTIONS OR HESITATIONS OF THE MOVEMENT  B) SLIGHT SLOWING  C) THE AMPLITUDE DECREMENTS NEAR THE END OF THE 10 MOVEMENTS.
AMPLITUDE_RLE: (0) NORMAL: NO TREMOR.
PARKINSONS_MEDS: ON
TOTAL_SCORE_LEFT: 10
AMPLITUDE_RUE: (0) NORMAL: NO TREMOR.
LEG_AGILITY_RIGHT: (0) NORMAL: NO PROBLEMS.

## 2023-08-28 ASSESSMENT — PAIN SCALES - GENERAL: PAINLEVEL: NO PAIN (0)

## 2023-08-28 NOTE — LETTER
"8/28/2023       RE: Fabiana Hu  [address]  [facility] [address]       Dear Colleague,    Thank you for referring your patient, Fabiana Hu, to the [facility] NEUROLOGY CLINIC [place] [facility]. Please see a copy of my visit note below.      ASSESSMENT:    Parkinson's Disease:  Patient has a 1 year history of PD.  Tremors, gait, and bed mobility have improved with Sinemet. However, she is experiencing dyskinesias in Lt body that is bothersome.     Sialorrhea: Bothersome. Using sugarless candy.       PLAN:    __  The following patient instructions provided: -     __  You can take Sinemet with juice or carbonated beverages.     __  After your daughter's wedding, consider going down on the Sinemet.    ___ Week 1: Try reducing the 4:30 pm dose.    PD Medications 7:30 am 11:30 am 4:30 am 7:30 pm   Sinemet 25/100 mg  1 1 1/2 1/2     __  Week 2:  If  you still have bothersome dyskinesias, try reducing either the 7:30 or 11:30 am dose from 1 tab to 1/2 tab.    __  You can keep going down to 1/2 tab 4 times a day.  If tremors, slowness, & gait difficulty become worse, let us know.    __  Check your Melatonin bottle for \"USP\" symbol.     Yue Mahajan, Pharm.D. recommends for OTC supplements to \"USP\" symbol. USP stands for the \"United [place] Pharmacopoeia.\" USP approval means you can be assured of purity, potency, stability and disintegration. Essentially it ensures that the product contains the ingredients listed on the label and that it has been made according to FDA Good Manufacturing Practices.    The two brands that always have \"USP\" are \"Nature Made\" and \"Ford Signature\" from [employer].     __  Here is the article on the Co Q 10 Enzyme trial.     https://jamanetwork.com/journals/jamaneurology/fullarticle/1173180#:~:text=In%20summary%2C%20although%20the%20QE3,the%20treatment%20of%20early%20PD.    __  Referral to Neurology for evaluation and possible " Botox injection.  You can see Dr. West.     __  I'll have one of the nurses call you regarding the  Class.     __ Return in 3 months to see Dr. Weston. You may return sooner as needed.               MOVEMENT DISORDERS CLINIC           PATIENT: Fabiana Hu    : 1960    DEN: 2023    REASON FOR VISIT: Parkinson's disease (PD) follow up.    HPI: Ms. Fabiana Hu is a 63 year old who came to the [facility] neurology clinic accompanied by her , Brandt, for a follow up visit.     She was last seen in the clinic on 2023 by Dr. Weston to establish care for PD. She is new to me.    She was diagnosed with PD in Aug 2022. She didn't recall her exact symptoms that made her see a neurologist. Pt and her  said that she had stiffness, shuffling gait, difficulty turning in bed, and some tremors in Lt hand. Pt didn't recall how bad her symptoms were. She was stared on Sinemet at her initial visit at [facility].    PD Medications 7:30 am 11:30 am 4:30 am 7:30 pm   Sinemet 25/100 mg  1 1 1 1     Pt reports that she goes to bed around 10:30 pm and gets up at 6 am to take her Levothyroxine.     Since she started Sinemet, turing in bed has improved. Also, walking and shuffling have improved.     She reports that left body dyskinesias are not controlled. Dyskinesias are more noticeable in the evenings when she is resting. While she is sitting, her Lt body constantly moves.     She has drooling that is bothersome during the day. Sometimes she is not aware and she finds her face or shirt wet.      She brought a few questions: -     Can I take the medication other than with water?    If I miss the dose, is it okay to take it?    She has difficulty with sleep. She is waking up 3 or more times to go to the bathroom. After waking up to urinate, she is able to fall back to sleep. She is taking medication to help with overactive bladder. She is also taking Melatonin 10 mg ER and Gabapentin 100 mg 4 cap.  This has been an ongoing issue. She has seen a sleep specialist at [facility].     Pt is from [place]. Her sister who lives in [place] is coming to stay with her as patient's daughter is getting  this coming weekend. Her sister read trials and recommends various supplements that might help PD symptoms. Pt wanted to know about Co Q 10 Enzyme to help motor symptoms.     She asked how she would know the progression of her disease and what to expect. She hasn't done  Education.       MDS UPDRS PART II   8/24/2023  2:02 PM   UPDRS EDL Scale    2.1 Speech 0    2.2 Salivation 4    2.3 Chew & Swallow 1    2.4 Eating  0    2.5 Dressing  0    2.6 Hygiene  0    2.7 Handwriting  0    2.8 Hobbies etc.  0    2.9 Turn in bed  0    2.10 Tremor  1    2.11 Stand from chair  0    2.12 Walking & Balance  3    2.13 Freezing  0    MDS-UPDRS II Total Score 9        MEDICATIONS:   Outpatient Medications Marked as Taking for the 8/28/23 encounter (Office Visit) with Mirian Keene APRN CNP   Medication Sig    buPROPion (WELLBUTRIN XL) 300 MG 24 hr tablet Take 300 mg by mouth daily    carbidopa-levodopa (SINEMET)  MG tablet Take one tablet at each of 7:30am, 11:30am, 4:30pm and 0.5 tablet at 7:30pm    gabapentin (NEURONTIN) 100 MG capsule TAKE 1 TO 2 CAPSULES BY MOUTH AT BEDTIME    levothyroxine (SYNTHROID/LEVOTHROID) 75 MCG tablet Take 1 tablet by mouth daily    lithium (ESKALITH) 300 MG tablet Take 600 mg by mouth At Bedtime    mirabegron (MYRBETRIQ) 25 MG 24 hr tablet Take 1 tablet (25 mg) by mouth daily       PHYSICAL EXAM:    VITAL SIGNS:  Blood pressure 114/76, pulse 64, weight 56 kg (123 lb 8 oz), SpO2 100 %.     GENERAL:  Ms. Hu is a pleasant  female who is well-groomed and well-developed sitting comfortably in the exam room without any distress.  Affect is appropriate.    Pt seems to forget certain details and relay on her  to provide answers.    MOVEMENT DISORDERS ASSESSMENT:  MDS UPDRS  III (Last Sinemet was about 7:30 am)   8/28/2023  10:00 AM   UPDRS Motor Scale    Time: 10:16    Medication On    R Brain DBS: None    L Brain DBS: None    Dyskinesia (LID) Yes  (LUE & LLE)   Did LID interfere No    Speech 1    Facial Expression 3    Rigidity Neck 2    Rigidity RUE 1    Rigidity LUE 2    Rigidity RLE 1    Rigidity LLE 2    Finger Taps R 0    Finger Taps L 0    Hand Mvt R 1    Hand Mvt L 2    Pron-/Supinate R 1    Pron-/Supinate L 2    Toe Tap R 0    Toe Tap L 1    Leg Agility R 0    Leg Agility L 0    Arise From Chair 0    Gait 1    Gait Freezing 0    Postural Stability 0    Posture 0    Global Spont Mvt 0    Postural Tremor RUE 0    Postural Tremor LUE 1    Kinetic Tremor RUE 0    Kinetic Tremor LUE 0    Rest Tremor RUE 0    Rest Tremor LUE 0    Rest Tremor RLE 0    Rest Tremor LLE 0    Rest Tremor Lip/Jaw 0    Rest Tremor Constancy 0    Total Right 4    Total Left 10    Axial Total 7    Total 21        Today I spent 60 minutes caring for the patient. Time was spent with reviewing records, meeting with the patient, answering questions, examining, placing referral, and documentation.          Again, thank you for allowing me to participate in the care of your patient.      Sincerely,    SHLOMO Mederos CNP

## 2023-08-28 NOTE — PROGRESS NOTES
"  ASSESSMENT:    Parkinson's Disease:  Patient has a 1 year history of PD.  Tremors, gait, and bed mobility have improved with Sinemet. However, she is experiencing dyskinesias in Lt body that is bothersome.     Sialorrhea: Bothersome. Using sugarless candy.       PLAN:    __  The following patient instructions provided: -     __  You can take Sinemet with juice or carbonated beverages.     __  After your daughter's wedding, consider going down on the Sinemet.    ___ Week 1: Try reducing the 4:30 pm dose.    PD Medications 7:30 am 11:30 am 4:30 am 7:30 pm   Sinemet 25/100 mg  1 1      __  Week 2:  If  you still have bothersome dyskinesias, try reducing either the 7:30 or 11:30 am dose from 1 tab to 1/2 tab.    __  You can keep going down to 1/2 tab 4 times a day.  If tremors, slowness, & gait difficulty become worse, let us know.    __  Check your Melatonin bottle for \"USP\" symbol.     Yue Mahajan, Pharm.D. recommends for OTC supplements to \"USP\" symbol. USP stands for the \"[place] Pharmacopoeia.\" USP approval means you can be assured of purity, potency, stability and disintegration. Essentially it ensures that the product contains the ingredients listed on the label and that it has been made according to FDA Good Manufacturing Practices.    The two brands that always have \"USP\" are \"Nature Made\" and \"Ford Signature\" from [employer].     __  Here is the article on the Co Q 10 Enzyme trial.     https://jamanetwork.com/journals/jamaneurology/fullarticle/2115774#:~:text=In%20summary%2C%20although%20the%20QE3,the%20treatment%20of%20early%20PD.    __  Referral to Neurology for evaluation and possible Botox injection.  You can see Dr. West.     __  I'll have one of the nurses call you regarding the  Class.     __ Return in 3 months to see Dr. Weston. You may return sooner as needed.               MOVEMENT DISORDERS CLINIC           PATIENT: Fabiana Hu    : 1960    DEN: " 2023    REASON FOR VISIT: Parkinson's disease (PD) follow up.    HPI: Ms. Fabiana Hu is a 63 year old who came to the [facility] neurology clinic accompanied by her , Brandt, for a follow up visit.     She was last seen in the clinic on 5/23/2023 by Dr. Weston to establish care for PD. She is new to me.    She was diagnosed with PD in Aug 2022. She didn't recall her exact symptoms that made her see a neurologist. Pt and her  said that she had stiffness, shuffling gait, difficulty turning in bed, and some tremors in Lt hand. Pt didn't recall how bad her symptoms were. She was stared on Sinemet at her initial visit at [facility].    PD Medications 7:30 am 11:30 am 4:30 am 7:30 pm   Sinemet 25/100 mg  1 1 1 1/2     Pt reports that she goes to bed around 10:30 pm and gets up at 6 am to take her Levothyroxine.     Since she started Sinemet, turing in bed has improved. Also, walking and shuffling have improved.     She reports that left body dyskinesias are not controlled. Dyskinesias are more noticeable in the evenings when she is resting. While she is sitting, her Lt body constantly moves.     She has drooling that is bothersome during the day. Sometimes she is not aware and she finds her face or shirt wet.      She brought a few questions: -       Can I take the medication other than with water?      If I miss the dose, is it okay to take it?      She has difficulty with sleep. She is waking up 3 or more times to go to the bathroom. After waking up to urinate, she is able to fall back to sleep. She is taking medication to help with overactive bladder. She is also taking Melatonin 10 mg ER and Gabapentin 100 mg 4 cap. This has been an ongoing issue. She has seen a sleep specialist at [facility].       Pt is from [place]. Her sister who lives in [place] is coming to stay with her as patient's daughter is getting  this coming weekend. Her sister read trials and recommends various supplements that might  help PD symptoms. Pt wanted to know about Co Q 10 Enzyme to help motor symptoms.       She asked how she would know the progression of her disease and what to expect. She hasn't done  Education.       MDS UPDRS PART II   8/24/2023  2:02 PM   UPDRS EDL Scale    2.1 Speech 0    2.2 Salivation 4    2.3 Chew & Swallow 1    2.4 Eating  0    2.5 Dressing  0    2.6 Hygiene  0    2.7 Handwriting  0    2.8 Hobbies etc.  0    2.9 Turn in bed  0    2.10 Tremor  1    2.11 Stand from chair  0    2.12 Walking & Balance  3    2.13 Freezing  0    MDS-UPDRS II Total Score 9        MEDICATIONS:   Outpatient Medications Marked as Taking for the 8/28/23 encounter (Office Visit) with Mirian Keene APRN CNP   Medication Sig     buPROPion (WELLBUTRIN XL) 300 MG 24 hr tablet Take 300 mg by mouth daily     carbidopa-levodopa (SINEMET)  MG tablet Take one tablet at each of 7:30am, 11:30am, 4:30pm and 0.5 tablet at 7:30pm     gabapentin (NEURONTIN) 100 MG capsule TAKE 1 TO 2 CAPSULES BY MOUTH AT BEDTIME     levothyroxine (SYNTHROID/LEVOTHROID) 75 MCG tablet Take 1 tablet by mouth daily     lithium (ESKALITH) 300 MG tablet Take 600 mg by mouth At Bedtime     mirabegron (MYRBETRIQ) 25 MG 24 hr tablet Take 1 tablet (25 mg) by mouth daily       PHYSICAL EXAM:    VITAL SIGNS:  Blood pressure 114/76, pulse 64, weight 56 kg (123 lb 8 oz), SpO2 100 %.     GENERAL:  Ms. Hu is a pleasant  female who is well-groomed and well-developed sitting comfortably in the exam room without any distress.  Affect is appropriate.    Pt seems to forget certain details and relay on her  to provide answers.    MOVEMENT DISORDERS ASSESSMENT:  MDS UPDRS III (Last Sinemet was about 7:30 am)   8/28/2023  10:00 AM   UPDRS Motor Scale    Time: 10:16    Medication On    R Brain DBS: None    L Brain DBS: None    Dyskinesia (LID) Yes  (LUE & LLE)   Did LID interfere No    Speech 1    Facial Expression 3    Rigidity Neck 2    Rigidity RUE 1     Rigidity LUE 2    Rigidity RLE 1    Rigidity LLE 2    Finger Taps R 0    Finger Taps L 0    Hand Mvt R 1    Hand Mvt L 2    Pron-/Supinate R 1    Pron-/Supinate L 2    Toe Tap R 0    Toe Tap L 1    Leg Agility R 0    Leg Agility L 0    Arise From Chair 0    Gait 1    Gait Freezing 0    Postural Stability 0    Posture 0    Global Spont Mvt 0    Postural Tremor RUE 0    Postural Tremor LUE 1    Kinetic Tremor RUE 0    Kinetic Tremor LUE 0    Rest Tremor RUE 0    Rest Tremor LUE 0    Rest Tremor RLE 0    Rest Tremor LLE 0    Rest Tremor Lip/Jaw 0    Rest Tremor Constancy 0    Total Right 4    Total Left 10    Axial Total 7    Total 21        Today I spent 60 minutes caring for the patient. Time was spent with reviewing records, meeting with the patient, answering questions, examining, placing referral, and documentation.    Radha Keene, PAUL, APRN  [facility] Neurology Clinic

## 2023-08-28 NOTE — PATIENT INSTRUCTIONS
"Dear Ms. Fabiana Hu,    Thank you for coming today.  During your visit, we have discussed the following:     __  You can take Sinemet with juice or carbonated beverages.     __  After your daughter's wedding, consider going down on the Sinemet.    ___ Week 1: Try reducing the 4:30 pm dose.    PD Medications 7:30 am 11:30 am 4:30 am 7:30 pm   Sinemet 25/100 mg  1 1 1/2 1/2     __  Week 2:  If  you still have bothersome dyskinesias, try reducing either the 7:30 or 11:30 am dose from 1 tab to 1/2 tab.    __  You can keep going down to 1/2 tab 4 times a day.  If tremors, slowness, & gait difficulty become worse, let us know.    __  Check your Melatonin bottle for \"USP\" symbol.     Yue Mahajan, Pharm.D. recommends for OTC supplements to \"USP\" symbol. USP stands for the \"[place] Pharmacopoeia.\" USP approval means you can be assured of purity, potency, stability and disintegration. Essentially it ensures that the product contains the ingredients listed on the label and that it has been made according to FDA Good Manufacturing Practices.    The two brands that always have \"USP\" are \"Nature Made\" and \"Ford Signature\" from [employer].     __  Here is the article on the Co Q 10 Enzyme trial.     https://jamanetwork.com/journals/jamaneurology/fullarticle/6658715#:~:text=In%20summary%2C%20although%20the%20QE3,the%20treatment%20of%20early%20PD.    __  Referral to Neurology for evaluation and possible Botox injection.  You can see Dr. West.     __  I'll have one of the nurses call you regarding the  Class.     __ Return in 3 months to see Dr. Weston. You may return sooner as needed.     For questions, you may send us a [facility] message or call [phone]    Fax number: [phone]    To make an appointment with one of our pharmacists, (Yue Mahajan, Pharm.D. or Yancy Wright PharmD), call [phone].    Radha Keene, PAUL, APRN  [facility] Neurology Clinic       "

## 2023-08-31 ENCOUNTER — TELEPHONE (OUTPATIENT)
Dept: NEUROLOGY | Facility: CLINIC | Age: 63
End: 2023-08-31
Payer: MEDICARE

## 2023-08-31 NOTE — TELEPHONE ENCOUNTER
Discussed the Parkinson's disease 101 class, Fabiana would like to attend the 9/7 class at 10AM. Her sister will be able to attend with her. Encouraged her to write down her and her sisters questions and bring these to the class. I will send the invite to her on [employer] the morning of.

## 2023-09-12 ENCOUNTER — OFFICE VISIT (OUTPATIENT)
Dept: NEUROPSYCHOLOGY | Facility: CLINIC | Age: 63
End: 2023-09-12
Attending: PSYCHIATRY & NEUROLOGY
Payer: COMMERCIAL

## 2023-09-12 DIAGNOSIS — F09 MENTAL DISORDER DUE TO GENERAL MEDICAL CONDITION: ICD-10-CM

## 2023-09-12 DIAGNOSIS — G20.A1 PARKINSON'S DISEASE (H): Primary | ICD-10-CM

## 2023-09-12 PROCEDURE — 90791 PSYCH DIAGNOSTIC EVALUATION: CPT

## 2023-09-12 PROCEDURE — 96132 NRPSYC TST EVAL PHYS/QHP 1ST: CPT

## 2023-09-12 PROCEDURE — 96133 NRPSYC TST EVAL PHYS/QHP EA: CPT

## 2023-09-12 PROCEDURE — 96139 PSYCL/NRPSYC TST TECH EA: CPT

## 2023-09-12 PROCEDURE — 96138 PSYCL/NRPSYC TECH 1ST: CPT

## 2023-09-12 NOTE — PROGRESS NOTES
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 Information was obtained via interview with the patient and review of her medical records. Records indicate a one year history of Parkinson's disease. Tremors, gait, and bed mobility have improved with sinemet. Dyskinesias in her left body have been bothersome. She was referred for neuropsychological evaluation by Ramon Weston M.D.,  for further characterization of any cognitive difficulties, to evaluate mood, and to establish a neurocognitive baseline.    CLINICAL INTERVIEW FINDINGS    Upon interview, Ms. Hu stated that she was diagnosed with Parkinson's disease with symptom onset around 2022.  She had shuffling gait and found it hard to move in bed.  She may possibly have had a tremor at that time.  Her psychiatrist was the one who suggested that she may have Parkinson's disease and referred her to a neurologist.  Now, dyskinesias are her most bothersome symptoms.  She thinks perhaps she may have a tremor when she uses the iPad, as she tends to hit things more than once.    Also in the last year or so, Ms. Hu has noticed at most subtle changes in cognition.  She stated that her memory was always poor but seems worse now.  Nonetheless, she is not having difficulty remembering what others have said, has not forgotten names of familiar people, and is not becoming lost in familiar places.  She notices difficulty over the last year with word finding and feels that her attention and concentration are not as good.  More often, she finds that she must reread information.  She has not noticed any changes in decision-making.  She lives with her , who has always managed their finances.  She manages her own medications, using an alarm on her phone.  She is not able to tell if she is late with the Sinemet.  She does not take extra doses on purpose,  and stated that she has missed some doses at times.  She drives, generally without difficulty, although she acknowledged that she is more nervous about driving and she limits her driving to city streets.  She cooks without difficulty and handles her personal cares independently.    Ms. Hu reported a history of episodic depression.  She first experienced a bout of depression in 1998, when she and her  moved to [place].  She has had several bouts of depression since then, with the most recent one starting in winter 2021, until spring of the following year.  Although her symptoms improved, it was around then that she developed the symptoms of Parkinson's disease.  The depression has been primarily treated with medications.  She has participated in psychotherapy in the past, and has recently started with a new clinic where she receives medication support.  They are working on getting her a therapist but there is a long waiting list.  She has never been hospitalized for psychiatric care and has never undergone electroconvulsive therapy.    When asked to describe her mood, Ms. Hu stated that it is good.  She noted that her daughter was  on September 2.  She is not feeling depressed or anxious.  She has not been crying anymore than normal.  Sleep has been quite poor.  She has trouble falling asleep and staying asleep.  She stated that she has been seen by a sleep specialist and was diagnosed with REM sleep behavior disorder and restless leg syndrome.  She takes gabapentin and melatonin.  She has an overactive bladder which affects her sleep.  She noted that last night she slept fairly well, about 5 hours, which is very good for her.  While she does not nap during the day, at times she tends to doze off.  Her appetite has generally been good.  She lost weight when she had her last bout of depression, but it has been stable over the last year.  Her energy level is generally good and she is able to walk a  lot.  Her interest level is good.  She denied suicidal ideation or any history of suicide attempts.  She has not had visual or auditory hallucinations.    Ms. Hu consumes 2 glasses of wine a week and 1 can of beer.  She denied illicit drug use or tobacco use.  She has never been in any chemical dependency treatment programs.    Ms. Hu was born in [place].  She completed her Bachelor's degree there.  She moved to the [place] in .  She worked as a teacher in the elementary and  levels.  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  She has been  since  and has two children.    Ms. Hu denied any history of seizure, stroke, or head injury resulting in loss of consciousness.  Her balance has been good and she has not been falling.  She has not had unilateral weakness or numbness.  She thinks that her left side is perhaps more affected by the Parkinson's disease, and she tends to lean to the left.  She is not bothered by headaches or other aches or pains.    Past Medical History    Major depressive disorder, urinary incontinence, hypothyroidism, fibroids, adenomatous polyp of colon, Parkinson's disease, E. coli UTI, arteriovenous malformation.    Current Medications    She has a current medication list which includes the following prescription(s): bupropion, carbidopa-levodopa, gabapentin, levothyroxine, lithium, melatonin, and mirabegron.    Family History    Family History   Problem Relation Age of Onset    Other - See Comments Mother         stomach tumor    LUNG DISEASE Mother          of pneumonia    Heart Disease Father     Cerebrovascular Disease Father     Other - See Comments Sister         long covid and headaches    Other - See Comments Brother     Depression Brother     Prostate Cancer Brother     Heart Disease Brother      Arthritis Brother         knee    Bipolar Disorder Daughter     Other - See Comments Daughter         living in [place]    Other - See Comments Son         living in [place]    Obesity Son      She has no known family history of Parkinson's disease or dementia.    BEHAVIORAL OBSERVATIONS    During the evaluation, Ms. Hu was pleasant, cooperative, and seemed to understand the instructions. She was alert and oriented to person, place, and time. Dyskinesias were observed clinically in her legs. Mood was euthymic. Speech was fluent, with normal articulation, volume, and rate. She presented her thoughts in a clear, logical manner. Internal performance validity measures fell within normal limits. The results are believed to accurately reflect her current level of functioning.    MEASURES ADMINISTERED    The following measures were administered by a trained psychometrist, under the direct supervision of a licensed psychologist.    Subtests of the Wechsler Adult Intelligence Scale-4; Reading subtest of the Wide Range Achievement Test-4; subtests of the Wechsler Memory Scale-4; Luong Verbal Learning Test-Revised, Form 1; Brief Visual Memory Test - Revised, Form 1; Cutler Naming Test; D-KEFS Verbal Fluency, Standard Form; Trail Making Test; Stroop; Wisconsin Card Sorting Test - 64; Chong Judgement of Line Orientation Form H; Yang-Osterrieth Complex Figure; Clock Drawing; Dementia Rating Scale - 2 (DRS-2) Standard Form;  Ndiaye Depression Inventory-2 (BDI-2), Apathy Scale.     RESULTS AND INTERPRETATION    Overall intellectual functioning was estimated to fall in the high average range, consistent with premorbid estimates of high average based on single word reading abilities.  Performance on a screening measure of dementia was above average (DRS-2 Total Score = 144/144).      Confrontation naming was average for her age and level of education.  Ability to comprehend and articulate responses to complex social situations  was average for her age.  Letter fluency was average. Generative naming to category was average, and switching fluency was above average for her age.    Attention span was average for her age.  Divided attention was below average for her age and level of education on a timed, visuomotor sequencing task, and was notable for some difficulty shifting cognitive set. On an untimed, auditory sequencing task, divided attention was average for her age.  Performance on a measure of distractibility was average for her age and level of education.  Psychomotor processing speed was average.    Basic visual perception, including matching lines and angles, was high average for her age and level of education.  Construction of a clock fell within normal limits.  Construction of a complex design fell within normal limits.  Nonverbal deductive reasoning was above average.    Novel problem-solving, including the ability to generate strategies and solutions, fell within normal liimts for her age and level of education.    Immediate recall of verbal narrative material was above average. Recall of the learned material following a 30 minute delay was above average. Recognition memory on this task was above average.  On a multiple trial list learning task, immediate recall was average, with high average recall following a 25-minute delay.  Recognition memory on this task was high average.  Immediate recall of visual material was high average, with high average recall following a 25-minute delay. Recognition memory on this task included no errors.    On the BDI-2, a self-report questionnaire, she endorsed minimal depressive symptomatology.  She endorsed minimal apathy on a scale.     IMPRESSIONS AND RECOMMENDATIONS    Fabiana Hu is a 63 year old woman with a history of Parkinson's disease diagnosed about a year ago.  Her most bothersome symptoms now are dyskinesias.  This neuropsychological evaluation was undertaken to establish a  neurocognitive baseline.    Results indicate performance that falls almost entirely within normal limits across cognitive domains, generally in the average to above average range. There is subtle executive dysfunction manifested in difficulty shifting cognitive set. Memory, language, attention, and visual processing all fall well within normal limits. She is not reporting significant depressive symptomatology or apathy.    This pattern of performance is suggestive of at most, subtle frontal system involvement. The findings do not reflect dementia at this time, nor are they suggestive of Mild Cognitive Impairment. While similar patterns may be observed with Parkinson's disease, it is also possible that nonrestorative sleep, and potentially medications, underlie her subtle cognitive inefficiencies.     In terms of daily functioning, Ms. Umañas cognitive inefficiencies are not likely to interfere with her ability to manage her instrumental activities of daily living independently, or to actively participate in treatment. She may find that she benefits from structure and routine. If she has difficulty managing large, complex tasks, others may assist by breaking down such tasks into smaller, more manageable parts.    Results may serve as a baseline of Ms. Hu's neurocognitive functioning. The evaluation may be repeated in the future for comparison should a change in mental status occur.    Valarie Fernandez, Ph.D., ABPP  Licensed Psychologist, [id number]  Board Certified in Clinical Neuropsychology      Time spent: One unit of professional time, including interview (CPT 74002). 60 minutes (1 unit) neuropsychological testing evaluation by licensed and board-certified neuropsychologist, including integration of patient data, interpretation of standardized test results and clinical data, clinical decision-making, treatment planning, report, and interactive feedback to the patient, first hour (CPT 45185). 102 minutes (2 units) of  neuropsychological testing evaluation by licensed and board-certified neuropsychologist, including integration of patient data, interpretation of standardized test results and clinical data, clinical decision-making, treatment planning, report, and interactive feedback to the patient, subsequent hours (CPT 49543). 30 minutes of neuropsychological test administration and scoring by technician, first 30 minutes (CPT 42192). 200 additional minutes (7 units) neuropsychological test administration and scoring by technician, subsequent 30 minutes (CPT 00819). ICD-10 Diagnoses: G20; F06.8.

## 2023-09-12 NOTE — PROGRESS NOTES
Pt was seen for neuropsychological evaluation at the request of Dr. Ramon Weston for the purposes of diagnostic clarification and treatment planning. 230 minutes of test administration and scoring were provided by this writer. Please see Dr. Valarie Fernandez's report for a full interpretation of the findings.    Asad Carter MA, CSP

## 2023-09-12 NOTE — LETTER
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 Information was obtained via interview with the patient and review of her medical records. Records indicate a one year history of Parkinson's disease. Tremors, gait, and bed mobility have improved with sinemet. Dyskinesias in her left body have been bothersome. She was referred for neuropsychological evaluation by Ramon Weston M.D.,  for further characterization of any cognitive difficulties, to evaluate mood, and to establish a neurocognitive baseline.    CLINICAL INTERVIEW FINDINGS    Upon interview, Ms. Hu stated that she was diagnosed with Parkinson's disease with symptom onset around 2022.  She had shuffling gait and found it hard to move in bed.  She may possibly have had a tremor at that time.  Her psychiatrist was the one who suggested that she may have Parkinson's disease and referred her to a neurologist.  Now, dyskinesias are her most bothersome symptoms.  She thinks perhaps she may have a tremor when she uses the iPad, as she tends to hit things more than once.    Also in the last year or so, Ms. Hu has noticed at most subtle changes in cognition.  She stated that her memory was always poor but seems worse now.  Nonetheless, she is not having difficulty remembering what others have said, has not forgotten names of familiar people, and is not becoming lost in familiar places.  She notices difficulty over the last year with word finding and feels that her attention and concentration are not as good.  More often, she finds that she must reread information.  She has not noticed any changes in decision-making.  She lives with her , who has always managed their finances.  She manages her own medications, using an alarm on her phone.  She is not able to tell  if she is late with the Sinemet.  She does not take extra doses on purpose, and stated that she has missed some doses at times.  She drives, generally without difficulty, although she acknowledged that she is more nervous about driving and she limits her driving to city streets.  She cooks without difficulty and handles her personal cares independently.    Ms. Hu reported a history of episodic depression.  She first experienced a bout of depression in 1998, when she and her  moved to [place].  She has had several bouts of depression since then, with the most recent one starting in winter 2021, until spring of the following year.  Although her symptoms improved, it was around then that she developed the symptoms of Parkinson's disease.  The depression has been primarily treated with medications.  She has participated in psychotherapy in the past, and has recently started with a new clinic where she receives medication support.  They are working on getting her a therapist but there is a long waiting list.  She has never been hospitalized for psychiatric care and has never undergone electroconvulsive therapy.    When asked to describe her mood, Ms. Hu stated that it is good.  She noted that her daughter was  on September 2.  She is not feeling depressed or anxious.  She has not been crying anymore than normal.  Sleep has been quite poor.  She has trouble falling asleep and staying asleep.  She stated that she has been seen by a sleep specialist and was diagnosed with REM sleep behavior disorder and restless leg syndrome.  She takes gabapentin and melatonin.  She has an overactive bladder which affects her sleep.  She noted that last night she slept fairly well, about 5 hours, which is very good for her.  While she does not nap during the day, at times she tends to doze off.  Her appetite has generally been good.  She lost weight when she had her last bout of depression, but it has been stable over the  last year.  Her energy level is generally good and she is able to walk a lot.  Her interest level is good.  She denied suicidal ideation or any history of suicide attempts.  She has not had visual or auditory hallucinations.    Ms. Hu consumes 2 glasses of wine a week and 1 can of beer.  She denied illicit drug use or tobacco use.  She has never been in any chemical dependency treatment programs.    Ms. Hu was born in [place].  She completed her Bachelor's degree there.  She moved to the [place] in .  She worked as a teacher in the elementary and  levels.  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  She has been  since  and has two children.    Ms. Hu denied any history of seizure, stroke, or head injury resulting in loss of consciousness.  Her balance has been good and she has not been falling.  She has not had unilateral weakness or numbness.  She thinks that her left side is perhaps more affected by the Parkinson's disease, and she tends to lean to the left.  She is not bothered by headaches or other aches or pains.    Past Medical History    Major depressive disorder, urinary incontinence, hypothyroidism, fibroids, adenomatous polyp of colon, Parkinson's disease, E. coli UTI, arteriovenous malformation.    Current Medications    She has a current medication list which includes the following prescription(s): bupropion, carbidopa-levodopa, gabapentin, levothyroxine, lithium, melatonin, and mirabegron.    Family History    Family History   Problem Relation Age of Onset     Other - See Comments Mother         stomach tumor     LUNG DISEASE Mother          of pneumonia     Heart Disease Father      Cerebrovascular Disease Father      Other - See Comments Sister         long covid and headaches     Other - See Comments Brother       Depression Brother      Prostate Cancer Brother      Heart Disease Brother      Arthritis Brother         knee     Bipolar Disorder Daughter      Other - See Comments Daughter         living in [place]     Other - See Comments Son         living in [place]     Obesity Son      She has no known family history of Parkinson's disease or dementia.    BEHAVIORAL OBSERVATIONS    During the evaluation, Ms. Hu was pleasant, cooperative, and seemed to understand the instructions. She was alert and oriented to person, place, and time. Dyskinesias were observed clinically in her legs. Mood was euthymic. Speech was fluent, with normal articulation, volume, and rate. She presented her thoughts in a clear, logical manner. Internal performance validity measures fell within normal limits. The results are believed to accurately reflect her current level of functioning.    MEASURES ADMINISTERED    The following measures were administered by a trained psychometrist, under the direct supervision of a licensed psychologist.    Subtests of the Wechsler Adult Intelligence Scale-4; Reading subtest of the Wide Range Achievement Test-4; subtests of the Wechsler Memory Scale-4; Luong Verbal Learning Test-Revised, Form 1; Brief Visual Memory Test - Revised, Form 1; [place] Naming Test; D-KEFS Verbal Fluency, Standard Form; Trail Making Test; Stroop; Wisconsin Card Sorting Test - 64; Chong Judgement of Line Orientation Form H; Yang-Osterrieth Complex Figure; Clock Drawing; Dementia Rating Scale - 2 (DRS-2) Standard Form;  Ndiaye Depression Inventory-2 (BDI-2), Apathy Scale.     RESULTS AND INTERPRETATION    Overall intellectual functioning was estimated to fall in the high average range, consistent with premorbid estimates of high average based on single word reading abilities.  Performance on a screening measure of dementia was above average (DRS-2 Total Score = 144/144).      Confrontation naming was average for her age and level of  education.  Ability to comprehend and articulate responses to complex social situations was average for her age.  Letter fluency was average. Generative naming to category was average, and switching fluency was above average for her age.    Attention span was average for her age.  Divided attention was below average for her age and level of education on a timed, visuomotor sequencing task, and was notable for some difficulty shifting cognitive set. On an untimed, auditory sequencing task, divided attention was average for her age.  Performance on a measure of distractibility was average for her age and level of education.  Psychomotor processing speed was average.    Basic visual perception, including matching lines and angles, was high average for her age and level of education.  Construction of a clock fell within normal limits.  Construction of a complex design fell within normal limits.  Nonverbal deductive reasoning was above average.    Novel problem-solving, including the ability to generate strategies and solutions, fell within normal liimts for her age and level of education.    Immediate recall of verbal narrative material was above average. Recall of the learned material following a 30 minute delay was above average. Recognition memory on this task was above average.  On a multiple trial list learning task, immediate recall was average, with high average recall following a 25-minute delay.  Recognition memory on this task was high average.  Immediate recall of visual material was high average, with high average recall following a 25-minute delay. Recognition memory on this task included no errors.    On the BDI-2, a self-report questionnaire, she endorsed minimal depressive symptomatology.  She endorsed minimal apathy on a scale.     IMPRESSIONS AND RECOMMENDATIONS    Fabiana Hu is a 63 year old woman with a history of Parkinson's disease diagnosed about a year ago.  Her most bothersome symptoms now are  dyskinesias.  This neuropsychological evaluation was undertaken to establish a neurocognitive baseline.    Results indicate performance that falls almost entirely within normal limits across cognitive domains, generally in the average to above average range. There is subtle executive dysfunction manifested in difficulty shifting cognitive set. Memory, language, attention, and visual processing all fall well within normal limits. She is not reporting significant depressive symptomatology or apathy.    This pattern of performance is suggestive of at most, subtle frontal system involvement. The findings do not reflect dementia at this time, nor are they suggestive of Mild Cognitive Impairment. While similar patterns may be observed with Parkinson's disease, it is also possible that nonrestorative sleep, and potentially medications, underlie her subtle cognitive inefficiencies.     In terms of daily functioning, Ms. Umañas cognitive inefficiencies are not likely to interfere with her ability to manage her instrumental activities of daily living independently, or to actively participate in treatment. She may find that she benefits from structure and routine. If she has difficulty managing large, complex tasks, others may assist by breaking down such tasks into smaller, more manageable parts.    Results may serve as a baseline of Ms. Hu's neurocognitive functioning. The evaluation may be repeated in the future for comparison should a change in mental status occur.    Valarie Fernandez, Ph.D., ABPP  Licensed Psychologist, LP [id number]  Board Certified in Clinical Neuropsychology      Time spent: One unit of professional time, including interview (CPT 14636). 60 minutes (1 unit) neuropsychological testing evaluation by licensed and board-certified neuropsychologist, including integration of patient data, interpretation of standardized test results and clinical data, clinical decision-making, treatment planning, report, and  interactive feedback to the patient, first hour (CPT 33128). 102 minutes (2 units) of neuropsychological testing evaluation by licensed and board-certified neuropsychologist, including integration of patient data, interpretation of standardized test results and clinical data, clinical decision-making, treatment planning, report, and interactive feedback to the patient, subsequent hours (CPT 51459). 30 minutes of neuropsychological test administration and scoring by technician, first 30 minutes (CPT 39012). 200 additional minutes (7 units) neuropsychological test administration and scoring by technician, subsequent 30 minutes (CPT 28191). ICD-10 Diagnoses: G20; F06.8.        Pt was seen for neuropsychological evaluation at the request of Dr. Ramon Weston for the purposes of diagnostic clarification and treatment planning. 230 minutes of test administration and scoring were provided by this writer. Please see Dr. Valarei Fernandez's report for a full interpretation of the findings.    Asad Carter MA, CSP            Valarie Fernandez, PhD LP

## 2023-09-14 NOTE — CONFIDENTIAL NOTE
Patient Fabiana Hu 1960    MRN [id number]  Provider      1960   [employer]    Sex Female   Occupation     Education 16   Language     Preferred Hand Right   Station OP    Age 63                 DEMENTIA RATING SCALE - 2   TEST FORM Standard     Raw MAS       Attention 37 13       Init/Psv 37 11       Construct 6 10       Concept 39 12       Memory 25 13       Total / 144 144 15                WAIS-IV          Raw ScS       Comprehension 26 11       Letter Num Seq 16 8       Digit Span 26 10       Matrix Reasoning 21 14                WIDE RANGE ACHIEVEMENT TEST   TEST FORM 5     Raw SS %ile Grade Eq.     Reading 67 117 87 >12.9              WECHSLER MEMORY SCALE - IV         Raw ScS/%ile       Info/Orientation 14        Logical Memory I 41 14       Logical Memory II 27 14       LM Recognition  21 >75                BOSTON NAMING TEST        Score (Correct+Stim Cue) 55 MAS 9     # Correct Stimulus Cue  0 T 47     # Correct Phonemic Cue 4                D-KEFS VERBAL FLUENCY   TEST FORM Standard     Raw ScS       Letter Fluency 35 10       Category Fluency 30 8       Switching Fluency             Total Correct 16 14       Switching Accuracy 15 14                CLOCK DRAWING         Command 3 /3 Rating: NORMAL     Copy 3 /3 Rating: NORMAL              TRAIL MAKING TEST          Seconds Errors MAS z     Trails A 34 1 10 0.15     Trails B 136 2 5 -1.45              STROOP          Raw T MAS      Word 87 37 8      Color  53 31 5      Color/Word 33 47 9      Interference 1 51                [place] CARD SORTING TEST - 1 deck       # Categories 4 %ile >16      # Persev Error 10 T 45      Concept. Lev Resp. 43 T 48      FMS 0                 TREVINO JUDGMENT OF LINE ORIENTATION TEST FORM H    Raw Score 28 Raw Correction 33     MAS 13 Trevino %ile 86              HVLT-R    TEST FORM 1     Raw T       Trial 1 6        Trial 2 10        Trial 3 12        Total 1-3 28 53       Learning 6        Delay 11 58      "  Percent Retained 92% 51       True Positives 12        Discrimination Index 12 59       False Positives 0                 BRIEF VISUAL MEMORY TEST - REVISED  TEST FORM 1     Raw T       Trial 1 6        Trial 2 9        Trial 3 11        Total 1-3 26 58       Learning 5 57       Delay 11 62       Percent Retained 100% >16 %ile      Recognition Hits 6        Discrimination Index 6 >16 %ile      False Alarms 0                 JOSE DE JESUS-O COMPLEX FIGURE TEST          Raw %ile       Copy  36 >16       Time to Copy 181\" >16                BDI         Score 3        Interpretation MINIMAL                 APATHY SCALE         Score 9          "

## 2023-09-22 ENCOUNTER — TELEPHONE (OUTPATIENT)
Dept: NEUROLOGY | Facility: CLINIC | Age: 63
End: 2023-09-22
Payer: MEDICARE

## 2023-09-22 NOTE — TELEPHONE ENCOUNTER
Discussed she should ask her primary doctor about appropriate allergy medications. She read that carbidopa/levodopa should not be taken with certain over the counter allergy medications. She is looking for something for seasonal allergies. I will double check with the team and call Fabiana back.

## 2023-09-22 NOTE — TELEPHONE ENCOUNTER
[facility] Call Center    Phone Message    May a detailed message be left on voicemail: yes     Reason for Call: Medication Question or concern regarding medication   Prescription Clarification  Name of Medication: Allergy Medications  Prescribing Provider: N/A   Pharmacy: N/A   What on the order needs clarification? Patient called and would like someone from the care team to call her back so she can discuss options for medications for her allergies. Please call partient back and advise at [phone].    Action Taken: Message routed to:  [facility] ([facility]): Neurology     Travel Screening: Not Applicable

## 2023-10-03 DIAGNOSIS — K11.7 SIALORRHEA: Primary | ICD-10-CM

## 2023-10-24 ENCOUNTER — OFFICE VISIT (OUTPATIENT)
Dept: NEUROLOGY | Facility: CLINIC | Age: 63
End: 2023-10-24
Payer: COMMERCIAL

## 2023-10-24 DIAGNOSIS — K11.7 SIALORRHEA: Primary | ICD-10-CM

## 2023-10-24 DIAGNOSIS — G20.A1 PARKINSON'S DISEASE, UNSPECIFIED WHETHER DYSKINESIA PRESENT, UNSPECIFIED WHETHER MANIFESTATIONS FLUCTUATE (H): ICD-10-CM

## 2023-10-24 PROCEDURE — 99215 OFFICE O/P EST HI 40 MIN: CPT | Mod: 25 | Performed by: STUDENT IN AN ORGANIZED HEALTH CARE EDUCATION/TRAINING PROGRAM

## 2023-10-24 PROCEDURE — 64611 CHEMODENERV SALIV GLANDS: CPT | Performed by: STUDENT IN AN ORGANIZED HEALTH CARE EDUCATION/TRAINING PROGRAM

## 2023-10-24 NOTE — PROGRESS NOTES
Department of Neurology  Movement Disorders Division   New Patient Visit    Patient: Fabiana Hu   MRN: [id number]   : 1960   Date of Visit: 10/24/2023    CC: Sialorrhea secondary to Parkinson's disease    HPI:  Fabiana Hu is a 63 year old right handed woman with Parkinson's disease who presents for evaluation of botulinum toxin for sialorrhea.     Ms. Hu presents with her , Adolph.     She reports that she has been having her chin and shirt getting wet from drooling. This tends to happen more when in the evening - especially if she is relaxing and watching television. She will sometimes put her hand up to her chin and find that she has been drooling. She doesn't always feel the presence of drool on her face. Her  will rarely point out drool at the corner of her mouth. Others don't point it out to her although she worries that they are just being polite.     She does not drooling on her pillow at night. She does have a bit of dry mouth over night -  she wonders if this is due to her medications. She wakes up several times a night and will take a drink of water each time. This typically takes care of things. She does not notice much dry mouth during the day.     She finds that the drooling is impacting her socializing - it can be very embarrassing. She has to carry a tissue on her at all times. She is always worried when they are out with people, especially as she tends to to notice her drooling. The drooling also affects what clothing she would wear because she doesn't want the drool to show up.     She hasn't tried any medications for drooling. She has found that sucking on a sweet is helpful while it is in her mouth. They have found sugar free candy at [employer].     She sees Radha Keene NP, and Dr. Weston here for her Parkinson's disease.       Past Medical History:   Diagnosis Date    Urinary incontinence 2023        Past Surgical History:   Procedure Laterality Date    breast surgery   2012    central duct excision right breast     SECTION      x2    EYE SURGERY Bilateral     cataract surgery    ORAL SURGERY IP CONSULT Left     oral mucosal lesion left        Current Outpatient Medications   Medication Sig Dispense Refill    buPROPion (WELLBUTRIN XL) 150 MG 24 hr tablet Take 150 mg by mouth every morning      buPROPion (WELLBUTRIN XL) 300 MG 24 hr tablet Take 300 mg by mouth daily      busPIRone (BUSPAR) 10 MG tablet Take 10 mg by mouth 3 times daily      busPIRone HCl (BUSPAR) 30 MG tablet See instructions      carbidopa-levodopa (SINEMET)  MG tablet Take one tablet at each of 7:30am, 11:30am, 4:30pm and 0.5 tablet at 7:30pm 315 tablet 1    citalopram (CELEXA) 20 MG tablet Take 20 mg by mouth daily      gabapentin (NEURONTIN) 100 MG capsule TAKE 1 TO 2 CAPSULES BY MOUTH AT BEDTIME      gabapentin (NEURONTIN) 400 MG capsule Take 400 mg by mouth daily      levomilnacipran (FETZIMA) 20 MG 24 hr capsule Take 20 mg by mouth daily      levomilnacipran (FETZIMA) 40 MG 24 hr capsule Take 40 mg by mouth daily      levothyroxine (SYNTHROID/LEVOTHROID) 75 MCG tablet Take 1 tablet by mouth daily      lithium (ESKALITH) 300 MG tablet Take 600 mg by mouth At Bedtime      Melatonin 10 MG TABS tablet Take 10 mg by mouth nightly as needed for sleep      mirabegron (MYRBETRIQ) 25 MG 24 hr tablet Take 1 tablet (25 mg) by mouth daily 30 tablet 11    prednisoLONE acetate (PRED FORTE) 1 % ophthalmic suspension INSTILL 1 DROP INTO AFFECTED EYE(S) BY OPHTHALMIC ROUTE 2 TIMES PER DAY      vibegron (GEMTESA) 75 MG TABS tablet Take 75 mg by mouth daily         No Known Allergies     Family History   Problem Relation Age of Onset    Other - See Comments Mother         stomach tumor    LUNG DISEASE Mother          of pneumonia    Heart Disease Father     Cerebrovascular Disease Father     Other - See Comments Sister         long covid and headaches    Other - See Comments Brother     Depression Brother      Prostate Cancer Brother     Heart Disease Brother     Arthritis Brother         knee    Bipolar Disorder Daughter     Other - See Comments Daughter         living in [place]    Other - See Comments Son         living in [place]    Obesity Son         Social History     Socioeconomic History    Marital status:      Spouse name: Not on file    Number of children: Not on file    Years of education: Not on file    Highest education level: Not on file   Occupational History    Not on file   Tobacco Use    Smoking status: Never    Smokeless tobacco: Never   Substance and Sexual Activity    Alcohol use: Yes    Drug use: Not on file    Sexual activity: Not on file   Other Topics Concern    Not on file   Social History Narrative    Not on file     Social Determinants of Health     Financial Resource Strain: Not on file   Food Insecurity: Not on file   Transportation Needs: Not on file   Physical Activity: Not on file   Stress: Not on file   Social Connections: Not on file   Interpersonal Safety: Not on file   Housing Stability: Not on file         Movement Disorders Botulinum Toxin Clinic Note  BOTULINUM NEUROTOXIN INJECTION PROCEDURES:    VERIFICATION OF PATIENT IDENTIFICATION AND PROCEDURE     Initials   Patient Name acc   Patient  acc   Procedure Verified by: [facility]     Above assessments performed by:  Jammie West MD      INDICATION/S FOR PROCEDURE/S:  Fabiana Hu is a 63 year old year old patient with sialorrhea secondary to PD with associated symptoms of trouble swallowing and social embarrassment .     Her baseline symptoms have been recalcitrant to oral medications and conservative therapy.  She is here today for an injection of Myobloc.      GOAL OF PROCEDURE:  The goal of this procedure is to decrease excessive drooling  associated with sialorrhea.    TOTAL DOSE ADMINISTERED:  Dose Administered: 1500 units Myobloc    Diluent Used:  Preservative Free Normal Saline  Total Volume of Diluent Used:   0.5 ml  NDC #: Myobloc 2500u ([address]-10)    CONSENT:  The risks, benefits, and treatment options were discussed with Fabiana Hu and she agreed to proceed.      Written consent was obtained by [facility].     EQUIPMENT USED:  Needle-30 gauge    SKIN PREPARATION:  Skin preparation was performed using an alcohol wipe.    GUIDANCE DESCRIPTION:  Guidance was not utilized for this procedure     AREA/MUSCLE INJECTED:    Visual display of locations injections are scanned into the chart under MEDIA tab.    Muscles Injected Units Injected Number of Injections   Left parotid 750 1   Right parotid 750 1        Total Units Injected: 1500    Unavoidable Waste: 1000    Total Units Billed 2500      The patient tolerated the injections without difficulty.      Assessment:    Fabiana Hu is a 63 year old female with sialorrhea.  Today we did repeat botulinum toxin injections.    Plan  Follow-up in 3 months' time to consider repeat injections      Neurology Attending Attestation:   I personally saw this patient with our fellow and agree with the their findings and plan of care as documented in the note. I personally performed salient aspects of the history and neurological examination.  I personally reviewed the vital signs and medications.  I personally performed the injections.      Jammie West MD   of Neurology  Movement Disorders Division    41 minutes spent on the date of the encounter doing chart review, history and exam, documentation and further activities as noted above.

## 2023-10-24 NOTE — LETTER
10/24/2023       RE: Fabiana Hu   [address]  [facility] [address]     Dear Colleague,    Thank you for referring your patient, Fabiana Hu, to the [facility]. Please see a copy of my visit note below.    Department of Neurology  Movement Disorders Division   New Patient Visit    Patient: Fabiana Hu   MRN: [id number]   : 1960   Date of Visit: 10/24/2023    CC: Sialorrhea secondary to Parkinson's disease    HPI:  Fabiana Hu is a 63 year old right handed woman with Parkinson's disease who presents for evaluation of botulinum toxin for sialorrhea.     Ms. Hu presents with her , Adolph.     She reports that she has been having her chin and shirt getting wet from drooling. This tends to happen more when in the evening - especially if she is relaxing and watching television. She will sometimes put her hand up to her chin and find that she has been drooling. She doesn't always feel the presence of drool on her face. Her  will rarely point out drool at the corner of her mouth. Others don't point it out to her although she worries that they are just being polite.     She does not drooling on her pillow at night. She does have a bit of dry mouth over night -  she wonders if this is due to her medications. She wakes up several times a night and will take a drink of water each time. This typically takes care of things. She does not notice much dry mouth during the day.     She finds that the drooling is impacting her socializing - it can be very embarrassing. She has to carry a tissue on her at all times. She is always worried when they are out with people, especially as she tends to to notice her drooling. The drooling also affects what clothing she would wear because she doesn't want the drool to show up.     She hasn't tried any medications for drooling. She has found that sucking on a sweet is  helpful while it is in her mouth. They have found sugar free candy at [employer].     She sees Radha Keene, DAYANARA, and Dr. Weston here for her Parkinson's disease.       Past Medical History:   Diagnosis Date    Urinary incontinence 2023        Past Surgical History:   Procedure Laterality Date    breast surgery  2012    central duct excision right breast     SECTION      x2    EYE SURGERY Bilateral     cataract surgery    ORAL SURGERY IP CONSULT Left     oral mucosal lesion left        Current Outpatient Medications   Medication Sig Dispense Refill    buPROPion (WELLBUTRIN XL) 150 MG 24 hr tablet Take 150 mg by mouth every morning      buPROPion (WELLBUTRIN XL) 300 MG 24 hr tablet Take 300 mg by mouth daily      busPIRone (BUSPAR) 10 MG tablet Take 10 mg by mouth 3 times daily      busPIRone HCl (BUSPAR) 30 MG tablet See instructions      carbidopa-levodopa (SINEMET)  MG tablet Take one tablet at each of 7:30am, 11:30am, 4:30pm and 0.5 tablet at 7:30pm 315 tablet 1    citalopram (CELEXA) 20 MG tablet Take 20 mg by mouth daily      gabapentin (NEURONTIN) 100 MG capsule TAKE 1 TO 2 CAPSULES BY MOUTH AT BEDTIME      gabapentin (NEURONTIN) 400 MG capsule Take 400 mg by mouth daily      levomilnacipran (FETZIMA) 20 MG 24 hr capsule Take 20 mg by mouth daily      levomilnacipran (FETZIMA) 40 MG 24 hr capsule Take 40 mg by mouth daily      levothyroxine (SYNTHROID/LEVOTHROID) 75 MCG tablet Take 1 tablet by mouth daily      lithium (ESKALITH) 300 MG tablet Take 600 mg by mouth At Bedtime      Melatonin 10 MG TABS tablet Take 10 mg by mouth nightly as needed for sleep      mirabegron (MYRBETRIQ) 25 MG 24 hr tablet Take 1 tablet (25 mg) by mouth daily 30 tablet 11    prednisoLONE acetate (PRED FORTE) 1 % ophthalmic suspension INSTILL 1 DROP INTO AFFECTED EYE(S) BY OPHTHALMIC ROUTE 2 TIMES PER DAY      vibegron (GEMTESA) 75 MG TABS tablet Take 75 mg by mouth daily         No Known Allergies     Family History    Problem Relation Age of Onset    Other - See Comments Mother         stomach tumor    LUNG DISEASE Mother          of pneumonia    Heart Disease Father     Cerebrovascular Disease Father     Other - See Comments Sister         long covid and headaches    Other - See Comments Brother     Depression Brother     Prostate Cancer Brother     Heart Disease Brother     Arthritis Brother         knee    Bipolar Disorder Daughter     Other - See Comments Daughter         living in [place]    Other - See Comments Son         living in [place]    Obesity Son         Social History     Socioeconomic History    Marital status:      Spouse name: Not on file    Number of children: Not on file    Years of education: Not on file    Highest education level: Not on file   Occupational History    Not on file   Tobacco Use    Smoking status: Never    Smokeless tobacco: Never   Substance and Sexual Activity    Alcohol use: Yes    Drug use: Not on file    Sexual activity: Not on file   Other Topics Concern    Not on file   Social History Narrative    Not on file     Social Determinants of Health     Financial Resource Strain: Not on file   Food Insecurity: Not on file   Transportation Needs: Not on file   Physical Activity: Not on file   Stress: Not on file   Social Connections: Not on file   Interpersonal Safety: Not on file   Housing Stability: Not on file         Movement Disorders Botulinum Toxin Clinic Note  BOTULINUM NEUROTOXIN INJECTION PROCEDURES:    VERIFICATION OF PATIENT IDENTIFICATION AND PROCEDURE     Initials   Patient Name acc   Patient  acc   Procedure Verified by: [facility]     Above assessments performed by:  Jammie West MD      INDICATION/S FOR PROCEDURE/S:  Fabiana Hu is a 63 year old year old patient with sialorrhea secondary to PD with associated symptoms of trouble swallowing and social embarrassment .     Her baseline symptoms have been recalcitrant to oral medications and conservative  therapy.  She is here today for an injection of Myobloc.      GOAL OF PROCEDURE:  The goal of this procedure is to decrease excessive drooling  associated with sialorrhea.    TOTAL DOSE ADMINISTERED:  Dose Administered: 1500 units Myobloc    Diluent Used:  Preservative Free Normal Saline  Total Volume of Diluent Used:  0.5 ml  NDC #: Myobloc 2500u ([address]-10)    CONSENT:  The risks, benefits, and treatment options were discussed with Fabiana Hu and she agreed to proceed.      Written consent was obtained by [facility].     EQUIPMENT USED:  Needle-30 gauge    SKIN PREPARATION:  Skin preparation was performed using an alcohol wipe.    GUIDANCE DESCRIPTION:  Guidance was not utilized for this procedure     AREA/MUSCLE INJECTED:    Visual display of locations injections are scanned into the chart under MEDIA tab.    Muscles Injected Units Injected Number of Injections   Left parotid 750 1   Right parotid 750 1        Total Units Injected: 1500    Unavoidable Waste: 1000    Total Units Billed 2500      The patient tolerated the injections without difficulty.      Assessment:    Fabiana Hu is a 63 year old female with sialorrhea.  Today we did repeat botulinum toxin injections.    Plan  Follow-up in 3 months' time to consider repeat injections      Neurology Attending Attestation:   I personally saw this patient with our fellow and agree with the their findings and plan of care as documented in the note. I personally performed salient aspects of the history and neurological examination.  I personally reviewed the vital signs and medications.  I personally performed the injections.    41 minutes spent on the date of the encounter doing chart review, history and exam, documentation and further activities as noted above.        Again, thank you for allowing me to participate in the care of your patient.      Sincerely,    Jammie West MD

## 2023-10-30 RX ORDER — BUSPIRONE HYDROCHLORIDE 10 MG/1
10 TABLET ORAL 3 TIMES DAILY
COMMUNITY
End: 2023-11-28

## 2023-10-30 RX ORDER — VIBEGRON 75 MG/1
75 TABLET, FILM COATED ORAL DAILY
COMMUNITY
Start: 2022-12-19 | End: 2023-11-28

## 2023-10-30 RX ORDER — BUPROPION HYDROCHLORIDE 150 MG/1
150 TABLET ORAL EVERY MORNING
COMMUNITY
Start: 2023-09-14

## 2023-10-30 RX ORDER — BUSPIRONE HYDROCHLORIDE 30 MG/1
TABLET ORAL
COMMUNITY
End: 2023-11-28

## 2023-10-30 RX ORDER — GABAPENTIN 400 MG/1
400 CAPSULE ORAL AT BEDTIME
COMMUNITY
Start: 2023-09-14

## 2023-10-30 RX ORDER — CITALOPRAM HYDROBROMIDE 20 MG/1
20 TABLET ORAL DAILY
COMMUNITY
End: 2023-11-28

## 2023-10-30 RX ORDER — PREDNISOLONE ACETATE 10 MG/ML
SUSPENSION/ DROPS OPHTHALMIC
COMMUNITY
End: 2023-11-26

## 2023-10-30 NOTE — PROGRESS NOTES
Diagnosis/Summary/Recommendations:    PATIENT: Fabiana Hu  63 year old female     : 1960    DEN: 2023    MRN: [id number]   W [facility] [address]     [phone] (H)   [phone] (M)      [email or web address]     Brandt hu spouse  [phone]     Referral from PCP   # possible Parkinson's; followed by neurology  She is currently at 1/2 tab daily and will follow-up with neurology.      Seen at [facility]     Assessment:  (G20) Parkinsonism, unspecified Parkinsonism type (H)  (primary encounter diagnosis)  No family history of parkinson      3/31/2023 dopamine scan (datscan) - not sure what medication was taking   Decreased radiotracer uptake in the caudate nuclei/putamina suspicious for a dopaminergic degenerative process such as Parkinson's disease.      Review of diagnosis    Parkinsonism  Diagnosed 2022  Presumed left side onset and has left arm now swinging      Avoidance of dopamine blockers   Not taking     Motor complication review   Has involuntary leg movements - that have begun since been on sinemet     Review of Impulse control disorders   Denies      Review of surgical or medication options   reviewed     Gait/Balance/Falls   Fell out bed  No falls otherwise     Exercise/Therapy performed/offered   Physical therapy for shoulder   Did big therapy at [facility]   Did some speech therapy at [facility]/Driving   [facility]  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 -   No changes in cognition  Nervous about driving      Mood   Depression  Anxiety  Diagnosed in [place] when was living there for 3  Years 1998  Diagnosed   Has incident of it then in  - not hospitalized   Had incident of it  - not hospitalized  Never suicidal   Tried a lot of antidepressant  Been on lithium for a long time   Brother Scar has depression and done well with lithium  Daughter diagnosed with bipolar  and is on lithium  "and doing okay - getting  this fall   Mood is stable  Sister is a nurse in [facility] sheyla      Spouse is retired and did software development  He now plays the [employer]     Psychiatry -   [place] - [facility] - guille/SLP        Hallucinations/delusions   No      Sleep   Sleep is \"horrible\"  Fall asleep okay   Has belching problem with tablets  Goes to bed around 1030pm   Wake up every 2 hours and has to go to the bathroom.  Nocturia every 2 hours - 3-4 times per night.   Talking in her sleep  Taking long acting melatonin   Has not been on melatonin very long  Not clear how long it has helped but it may have helped.   Sleep study was done at [facility] and diagnosed with  RBD  Had mild sleep apnea and tried cpap 5 years and not using this.   Has some creepy crawling feeling in bed when going to bed  Has some movement of legs to relieve symptoms  Not sure if there is PLMS - no leg jerking  Using gabapentin and melatonin for the dream enactment behavior     Bladder/Renal/Prostate/Gyn/Other  E coli uti  Urinary incontinence sees specialist  Wearing a pad - it has been really bad   Urinary urgency  No significant dribbling or stress urinary incontinence.   Urinary frequency at night  Small volumes  Empty out.   Has not been on any treatment.   Was given sample of gemtesa which she has not tried.   Has drinking a lot of water     GI/Constipation/GERD   Adenomas  Constipation has improved due to eating brown bread  Esophageal dysmotility and has not had motility study or an EGD  Has not had medications for this   h pylori diagnosed by Gi person and given antibiotic and tested afterwards  Using tums as needed         ENDO/Lipid/DM/Bone density/Thyroid  Hypothyroidism  Denies cholesterol or diabetes     Cardio/heart/Hyper or Hypotensive   AVM in right leg   Varicose veins  No blood pressure issue   No fainting or passing     Vision/Dry Eyes/Cataracts/Glaucoma/Macular   Wears glasses  Had " cataract surgery bilateral      Heme/Anticoagulation/Antiplatelet/Anemia/Other  Not taking aspirin     ENT/Resp  Seasonal allergies  Smell loss for a long time  Drooling in the past  Has not had covid     Skin/Cancer/Seborrhea/other  Atypical nevus  Psoriasis   Lesions removed but not clear if cancer     Musculoskeletal/Pain/Headache  Has back pain and going for physical therapy  Has done big therapy and developed shoulder pain - left shoulder  Having right shoulder pain and going for physical therapy  Has some back pain     Other:  Fibroids  Breast lumps/mass  Had biopsy 2012        Pharmacy ([facility]) consultation and medication management     Denys Maribeth Garciaa was expensive and did not try  Has not been tried on mirabegron (myrbetriq) 25mg daily     Genetic testing  [email or web address]  PDgeneration -   invitae      1. Would start with improving the neurogenic bladder issues - trial of mirabegron or other options     2. Sinemet dose in evening or during the night - using the short acting 1/2 tablet or trying long acting     3. Clean up timing of night time medication     4. Ongoing esophageal dysmotility/swallowing problems. - EGD or motility     5. Early discussion about deep brain stimulation (DBS) because of her dyskinesias in her legs that are occurring early in the course of disease.      6. Trial of amantadine and long acting amantadine (Gocovri)     7. Neuropsychological baseline evaluation     8. Pharmacy consultation as noted above.      9. Genetics consultation with Lee Lewis      10. Return to see Radha Keene NP to get baseline motor evaluation.        Parkinson's Disease:  Patient has a 1 year history of PD.  Tremors, gait, and bed mobility have improved with Sinemet. However, she is experiencing dyskinesias in Lt body that is bothersome.   MDS UPDRS PART II    8/24/2023  2:02 PM   UPDRS EDL Scale     2.1 Speech 0    2.2 Salivation 4    2.3 Chew & Swallow 1    2.4 Eating  0    2.5  "Dressing  0    2.6 Hygiene  0    2.7 Handwriting  0    2.8 Hobbies etc.  0    2.9 Turn in bed  0    2.10 Tremor  1    2.11 Stand from chair  0    2.12 Walking & Balance  3    2.13 Freezing  0    MDS-UPDRS II Total Score 9           Sialorrhea: Bothersome. Using sugarless candy.        __  The following patient instructions provided: -      __  You can take Sinemet with juice or carbonated beverages.      __  After your daughter's wedding, consider going down on the Sinemet.     ___ Week 1: Try reducing the 4:30 pm dose.     PD Medications 7:30 am 11:30 am 4:30 am 7:30 pm   Sinemet 25/100 mg  1 1 1/2 1/2      __  Week 2:  If  you still have bothersome dyskinesias, try reducing either the 7:30 or 11:30 am dose from 1 tab to 1/2 tab.     __  You can keep going down to 1/2 tab 4 times a day.  If tremors, slowness, & gait difficulty become worse, let us know.     __  Check your Melatonin bottle for \"USP\" symbol.      Yue Mahajan, Pharm.D. recommends for OTC supplements to \"USP\" symbol. USP stands for the \"United [place] Pharmacopoeia.\" USP approval means you can be assured of purity, potency, stability and disintegration. Essentially it ensures that the product contains the ingredients listed on the label and that it has been made according to FDA Good Manufacturing Practices.     The two brands that always have \"USP\" are \"Nature Made\" and \"Ford Signature\" from [employer].      __  Here is the article on the Co Q 10 Enzyme trial.      https://jamanetwork.com/journals/jamaneurology/fullarticle/7359999#:~:text=In%20summary%2C%20although%20the%20QE3,the%20treatment%20of%20early%20PD.     __  Referral to Neurology for evaluation and possible Botox injection.  You can see Dr. West.      __  I'll have one of the nurses call you regarding the  Class.      __ Return in 3 months to see Dr. Weston. You may return sooner as needed.     Cognitive evaluation 9/12/2023 Dr. Jim Hu is a 63 year old woman with a " history of Parkinson's disease diagnosed about a year ago.  Her most bothersome symptoms now are dyskinesias.  This neuropsychological evaluation was undertaken to establish a neurocognitive baseline.     Results indicate performance that falls almost entirely within normal limits across cognitive domains, generally in the average to above average range. There is subtle executive dysfunction manifested in difficulty shifting cognitive set. Memory, language, attention, and visual processing all fall well within normal limits. She is not reporting significant depressive symptomatology or apathy.     This pattern of performance is suggestive of at most, subtle frontal system involvement. The findings do not reflect dementia at this time, nor are they suggestive of Mild Cognitive Impairment. While similar patterns may be observed with Parkinson's disease, it is also possible that nonrestorative sleep, and potentially medications, underlie her subtle cognitive inefficiencies.     Botox injection 10/24/2023 ???     Medications     730 8/830 1130 430p 730p 8p 10p   Botulinum toxin type B myobloc          Bupropion wellbutrin XL 150mg 24hr  1        Bupropion Wellbutrin XL 300mg 24hr no             Buspirone buspar 10mg off         Buspiroine buspar 30mg off         Carbidopa/levodopa Sinemet 25/100 1/2   1/2 1/2 1/2        Citalopram celexa 20mg off         Gabapentin neurontin 400mg      1    Gabapentin neurontin 100mg off         Levomilnacipran Fetzima 20mg 24hr off         Levomilnacipran fetzima 40mg 24hr  off         LEvothyroxine synthroid levothyroid 75mcg 1               Lithium eskalith 300mg           2     Melatonin 10mg extended release (10mg B6)           1     Mirabegron myrbetriq 25mg 24hr ->50mg trial                Prednisolone pred forte ophthalm  off                Vibegron gemtesa 75mg  off                                                                                                                                                                                                                                             Plan:    Belching is better  Drinking water with orange juice to get medications down    No travel plans.     Dry eyes  Over the counter product  Pharmacy ([facility]) consultation and medication management  Please call the scheduling number I@ [phone] to set up an appointment with pharmacists Yue Mahajan or Yancy Wright.     Urinary urgency/frequency  Trial of 50mg of mirabegron  Physical therapy referral  Urology referral -     Denies constipation presently  Had a colonoscopy which was a disaster and had to do a second clean out  Had 3 polyps removed and will need to go back in 3 years.     [facility] [employer]  [email or web address]  Therapist [place] - on line   medication manager   Silvia Faulkner  DNP, APRN, CNP, PMHNP-BC  Psychiatric Nurse Practitioner    Hopefully lithium will be reduced  Is on gabapentin neurontin 400mg at night  Using wellbutrin xl 150mg in the morning    Melatonin 10mg at night.    Dyskinesias are better with lower dose of sinemet  No freezing  No falls  Exercising - has a fitness class for parkinson patients   [employer] class  Walking weekly with friends.   Virtual class may join    Botox was done and not clear it was that helpful but will try a bigger dose.           Medications     730 8/830 1130 430p 730p 8p 10p   Botulinum toxin type B myobloc trial         Bupropion wellbutrin XL 150mg 24hr  1        Carbidopa/levodopa Sinemet 25/100 1/2   1/2 1/2 1/2        Gabapentin neurontin 400mg      1    LEvothyroxine synthroid levothyroid 75mcg 1               Lithium eskalith 300mg           2     Melatonin 10mg extended release (10mg B6)           1     Mirabegron myrbetriq 50mg trial                                                                                                                               Coding  statement:   Medical Decision Making:  #  Chronic progressive medical conditions addressed  - see above --   Review and/or interpretation of unique test or documentation from a provider outside of neurology no   Independent historian provided additional details  yes I  Prescription drug management and review of potential side effects and/or monitoring for side effects  -- see above ---  Health impacted by social determinants of health  no    I have reviewed the note as documented above.  This accurately captures the substance of my conversation with the patient and total time spent preparing for visit, executing visit and completing visit on the day of the visit:  30 minutes.  The portion of this total time included face to face time 29 minutes.      Ramon Weston MD     ______________________________________    Last visit date and details:             ______________________________________      Patient was asked about 14 Review of systems including changes in vision (dry eyes, double vision), hearing, heart, lungs, musculoskeletal, depression, anxiety, snoring, RBD, insomnia, urinary frequency, urinary urgency, constipation, swallowing problems, hematological, ID, allergies, skin problems: seborrhea, endocrinological: thyroid, diabetes, cholesterol; balance, weight changes, and other neurological problems and these were not significant at this time except for   Patient Active Problem List   Diagnosis     Arteriovenous malformation (AVM)     Atypical nevus     Chest pain, unspecified     Colon adenomas     E. coli UTI     Hypothyroidism     Fibroids     Lump or mass in breast     Major depressive disorder, recurrent episode (H24)     Parkinson's disease     Psoriasis     Seasonal allergies     Severe episode of recurrent major depressive disorder, with psychotic features (H)     Varicose veins of other specified sites     Urinary incontinence     Adenomatous polyp of colon     Seasonal allergic rhinitis     Major  depressive disorder     Dyskinesia due to Parkinson's disease     Sialorrhea        No Known Allergies  Past Surgical History:   Procedure Laterality Date     breast surgery  2012    central duct excision right breast      SECTION      x2     EYE SURGERY Bilateral     cataract surgery     ORAL SURGERY IP CONSULT Left     oral mucosal lesion left     Past Medical History:   Diagnosis Date     Urinary incontinence 2023     Social History     Socioeconomic History     Marital status:      Spouse name: Not on file     Number of children: Not on file     Years of education: Not on file     Highest education level: Not on file   Occupational History     Not on file   Tobacco Use     Smoking status: Never     Smokeless tobacco: Never   Substance and Sexual Activity     Alcohol use: Yes     Drug use: Not on file     Sexual activity: Not on file   Other Topics Concern     Not on file   Social History Narrative     Not on file     Social Determinants of Health     Financial Resource Strain: Not on file   Food Insecurity: Not on file   Transportation Needs: Not on file   Physical Activity: Not on file   Stress: Not on file   Social Connections: Not on file   Interpersonal Safety: Not on file   Housing Stability: Not on file       Drug and lactation database from the [place] National Library of Medicine:  http://toxnet.nlm.nih.gov/cgi-bin/sis/htmlgen?LACT      B/P: Data Unavailable, T: Data Unavailable, P: Data Unavailable, R: Data Unavailable 0 lbs 0 oz  There were no vitals taken for this visit., There is no height or weight on file to calculate BMI.  Medications and Vitals not listed above were documented in the cart and reviewed by me.     Current Outpatient Medications   Medication Sig Dispense Refill     buPROPion (WELLBUTRIN XL) 150 MG 24 hr tablet Take 150 mg by mouth every morning       gabapentin (NEURONTIN) 400 MG capsule Take 400 mg by mouth daily       vibegron (GEMTESA) 75 MG TABS tablet  Take 75 mg by mouth daily       buPROPion (WELLBUTRIN XL) 300 MG 24 hr tablet Take 300 mg by mouth daily       busPIRone (BUSPAR) 10 MG tablet Take 10 mg by mouth 3 times daily       busPIRone HCl (BUSPAR) 30 MG tablet See instructions       carbidopa-levodopa (SINEMET)  MG tablet Take one tablet at each of 7:30am, 11:30am, 4:30pm and 0.5 tablet at 7:30pm 315 tablet 1     citalopram (CELEXA) 20 MG tablet Take 20 mg by mouth daily       gabapentin (NEURONTIN) 100 MG capsule TAKE 1 TO 2 CAPSULES BY MOUTH AT BEDTIME       levomilnacipran (FETZIMA) 20 MG 24 hr capsule Take 20 mg by mouth daily       levomilnacipran (FETZIMA) 40 MG 24 hr capsule Take 40 mg by mouth daily       levothyroxine (SYNTHROID/LEVOTHROID) 75 MCG tablet Take 1 tablet by mouth daily       lithium (ESKALITH) 300 MG tablet Take 600 mg by mouth At Bedtime       Melatonin 10 MG TABS tablet Take 10 mg by mouth nightly as needed for sleep       mirabegron (MYRBETRIQ) 25 MG 24 hr tablet Take 1 tablet (25 mg) by mouth daily 30 tablet 11     prednisoLONE acetate (PRED FORTE) 1 % ophthalmic suspension INSTILL 1 DROP INTO AFFECTED EYE(S) BY OPHTHALMIC ROUTE 2 TIMES PER DAY           Ramon Weston MD

## 2023-11-28 ENCOUNTER — OFFICE VISIT (OUTPATIENT)
Dept: NEUROLOGY | Facility: CLINIC | Age: 63
End: 2023-11-28
Payer: COMMERCIAL

## 2023-11-28 VITALS
HEART RATE: 58 BPM | DIASTOLIC BLOOD PRESSURE: 76 MMHG | SYSTOLIC BLOOD PRESSURE: 116 MMHG | OXYGEN SATURATION: 99 % | WEIGHT: 125 LBS | HEIGHT: 60 IN | RESPIRATION RATE: 16 BRPM | BODY MASS INDEX: 24.54 KG/M2

## 2023-11-28 DIAGNOSIS — G20.A1 PARKINSON'S DISEASE, UNSPECIFIED WHETHER DYSKINESIA PRESENT, UNSPECIFIED WHETHER MANIFESTATIONS FLUCTUATE (H): Primary | ICD-10-CM

## 2023-11-28 DIAGNOSIS — R39.9 LOWER URINARY TRACT SYMPTOMS (LUTS): ICD-10-CM

## 2023-11-28 DIAGNOSIS — G20.C PARKINSONISM, UNSPECIFIED PARKINSONISM TYPE (H): ICD-10-CM

## 2023-11-28 PROCEDURE — 99214 OFFICE O/P EST MOD 30 MIN: CPT | Performed by: PSYCHIATRY & NEUROLOGY

## 2023-11-28 RX ORDER — CARBIDOPA AND LEVODOPA 25; 100 MG/1; MG/1
TABLET ORAL
Qty: 270 TABLET | Refills: 3 | COMMUNITY
Start: 2023-11-28 | End: 2024-03-05

## 2023-11-28 RX ORDER — MIRABEGRON 50 MG/1
50 TABLET, EXTENDED RELEASE ORAL DAILY
Qty: 30 TABLET | Refills: 11 | Status: SHIPPED | OUTPATIENT
Start: 2023-11-28 | End: 2024-04-12

## 2023-11-28 ASSESSMENT — PATIENT HEALTH QUESTIONNAIRE - PHQ9: SUM OF ALL RESPONSES TO PHQ QUESTIONS 1-9: 0

## 2023-11-28 NOTE — LETTER
2023       RE: Fabiana Hu   [address]  [facility] [address]     Dear Colleague,    Thank you for referring your patient, Fabiana Hu, to the [facility] NEUROLOGY [facility]IN[facility] [place] at [facility]. Please see a copy of my visit note below.        Diagnosis/Summary/Recommendations:    PATIENT: Fabiana Hu  63 year old female     : 1960    DEN: 2023    MRN: [id number]   W ISAC SCHNEIDER   [facility] [address]     [phone] ()   [phone] ()      [email or web address]     Brandt hu spouse  [phone]     Referral from PCP   # possible Parkinson's; followed by neurology  She is currently at 1/2 tab daily and will follow-up with neurology.      Seen at [facility]     Assessment:  (G20) Parkinsonism, unspecified Parkinsonism type (H)  (primary encounter diagnosis)  No family history of parkinson      3/31/2023 dopamine scan (datscan) - not sure what medication was taking   Decreased radiotracer uptake in the caudate nuclei/putamina suspicious for a dopaminergic degenerative process such as Parkinson's disease.      Review of diagnosis    Parkinsonism  Diagnosed 2022  Presumed left side onset and has left arm now swinging      Avoidance of dopamine blockers   Not taking     Motor complication review   Has involuntary leg movements - that have begun since been on sinemet     Review of Impulse control disorders   Denies      Review of surgical or medication options   reviewed     Gait/Balance/Falls   Fell out bed  No falls otherwise     Exercise/Therapy performed/offered   Physical therapy for shoulder   Did big therapy at [facility]   Did some speech therapy at [place]      Cognitive/Driving   [employer]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 -   No changes in cognition  Nervous about driving      Mood   Depression  Anxiety  Diagnosed in [place] when was living there for 3  Years  "1998  Diagnosed 1999  Has incident of it then in 1999 - not hospitalized   Had incident of it 2010 - not hospitalized  Never suicidal   Tried a lot of antidepressant  Been on lithium for a long time   Brother Scar has depression and done well with lithium  Daughter diagnosed with bipolar 2014 and is on lithium and doing okay - getting  this fall   Mood is stable  Sister is a nurse in [employer]      Spouse is retired and did software development  He now plays the [employer]     Psychiatry -   [facility]        Hallucinations/delusions   No      Sleep   Sleep is \"horrible\"  Fall asleep okay   Has belching problem with tablets  Goes to bed around 1030pm   Wake up every 2 hours and has to go to the bathroom.  Nocturia every 2 hours - 3-4 times per night.   Talking in her sleep  Taking long acting melatonin   Has not been on melatonin very long  Not clear how long it has helped but it may have helped.   Sleep study was done at [facility] and diagnosed with  RBD  Had mild sleep apnea and tried cpap 5 years and not using this.   Has some creepy crawling feeling in bed when going to bed  Has some movement of legs to relieve symptoms  Not sure if there is PLMS - no leg jerking  Using gabapentin and melatonin for the dream enactment behavior     Bladder/Renal/Prostate/Gyn/Other  E coli uti  Urinary incontinence sees specialist  Wearing a pad - it has been really bad   Urinary urgency  No significant dribbling or stress urinary incontinence.   Urinary frequency at night  Small volumes  Empty out.   Has not been on any treatment.   Was given sample of gemtesa which she has not tried.   Has drinking a lot of water     GI/Constipation/GERD   Adenomas  Constipation has improved due to eating brown bread  Esophageal dysmotility and has not had motility study or an EGD  Has not had medications for this   h pylori diagnosed by Gi person and given antibiotic and tested " afterwards  Using tums as needed         ENDO/Lipid/DM/Bone density/Thyroid  Hypothyroidism  Denies cholesterol or diabetes     Cardio/heart/Hyper or Hypotensive   AVM in right leg   Varicose veins  No blood pressure issue   No fainting or passing     Vision/Dry Eyes/Cataracts/Glaucoma/Macular   Wears glasses  Had cataract surgery bilateral      Heme/Anticoagulation/Antiplatelet/Anemia/Other  Not taking aspirin     ENT/Resp  Seasonal allergies  Smell loss for a long time  Drooling in the past  Has not had covid     Skin/Cancer/Seborrhea/other  Atypical nevus  Psoriasis   Lesions removed but not clear if cancer     Musculoskeletal/Pain/Headache  Has back pain and going for physical therapy  Has done big therapy and developed shoulder pain - left shoulder  Having right shoulder pain and going for physical therapy  Has some back pain     Other:  Fibroids  Breast lumps/mass  Had biopsy 2012        Pharmacy ([facility]) consultation and medication management     Denys Stahl     Gemtesa was expensive and did not try  Has not been tried on mirabegron (myrbetriq) 25mg daily     Genetic testing  [email or web address]  PDgeneration -   invitae      1. Would start with improving the neurogenic bladder issues - trial of mirabegron or other options     2. Sinemet dose in evening or during the night - using the short acting 1/2 tablet or trying long acting     3. Clean up timing of night time medication     4. Ongoing esophageal dysmotility/swallowing problems. - EGD or motility     5. Early discussion about deep brain stimulation (DBS) because of her dyskinesias in her legs that are occurring early in the course of disease.      6. Trial of amantadine and long acting amantadine (Gocovri)     7. Neuropsychological baseline evaluation     8. Pharmacy consultation as noted above.      9. Genetics consultation with Lee Lewis      10. Return to see Radha Keene NP to get baseline motor evaluation.        Parkinson's Disease:   "Patient has a 1 year history of PD.  Tremors, gait, and bed mobility have improved with Sinemet. However, she is experiencing dyskinesias in Lt body that is bothersome.   MDS UPDRS PART II    8/24/2023  2:02 PM   UPDRS EDL Scale     2.1 Speech 0    2.2 Salivation 4    2.3 Chew & Swallow 1    2.4 Eating  0    2.5 Dressing  0    2.6 Hygiene  0    2.7 Handwriting  0    2.8 Hobbies etc.  0    2.9 Turn in bed  0    2.10 Tremor  1    2.11 Stand from chair  0    2.12 Walking & Balance  3    2.13 Freezing  0    MDS-UPDRS II Total Score 9           Sialorrhea: Bothersome. Using sugarless candy.        __  The following patient instructions provided: -      __  You can take Sinemet with juice or carbonated beverages.      __  After your daughter's wedding, consider going down on the Sinemet.     ___ Week 1: Try reducing the 4:30 pm dose.     PD Medications 7:30 am 11:30 am 4:30 am 7:30 pm   Sinemet 25/100 mg  1 1 1/2 1/2      __  Week 2:  If  you still have bothersome dyskinesias, try reducing either the 7:30 or 11:30 am dose from 1 tab to 1/2 tab.     __  You can keep going down to 1/2 tab 4 times a day.  If tremors, slowness, & gait difficulty become worse, let us know.     __  Check your Melatonin bottle for \"USP\" symbol.      Yue Mahajan Pharm.D. recommends for OTC supplements to \"USP\" symbol. USP stands for the \"United States Pharmacopoeia.\" USP approval means you can be assured of purity, potency, stability and disintegration. Essentially it ensures that the product contains the ingredients listed on the label and that it has been made according to FDA Good Manufacturing Practices.     The two brands that always have \"USP\" are \"Nature Made\" and \"Ford Signature\" from [employer].      __  Here is the article on the Co Q 10 Enzyme trial.      https://jamanetwork.com/journals/jamaneurology/fullarticle/4586638#:~:text=In%20summary%2C%20although%20the%20QE3,the%20treatment%20of%20early%20PD.     __  Referral to " Neurology for evaluation and possible Botox injection.  You can see Dr. West.      __  I'll have one of the nurses call you regarding the  Class.      __ Return in 3 months to see Dr. Weston. You may return sooner as needed.     Cognitive evaluation 9/12/2023 Dr. Fernandez    Fabiana Hu is a 63 year old woman with a history of Parkinson's disease diagnosed about a year ago.  Her most bothersome symptoms now are dyskinesias.  This neuropsychological evaluation was undertaken to establish a neurocognitive baseline.     Results indicate performance that falls almost entirely within normal limits across cognitive domains, generally in the average to above average range. There is subtle executive dysfunction manifested in difficulty shifting cognitive set. Memory, language, attention, and visual processing all fall well within normal limits. She is not reporting significant depressive symptomatology or apathy.     This pattern of performance is suggestive of at most, subtle frontal system involvement. The findings do not reflect dementia at this time, nor are they suggestive of Mild Cognitive Impairment. While similar patterns may be observed with Parkinson's disease, it is also possible that nonrestorative sleep, and potentially medications, underlie her subtle cognitive inefficiencies.     Botox injection 10/24/2023 ???     Medications     730 8/830 1130 430p 730p 8p 10p   Botulinum toxin type B myobloc          Bupropion wellbutrin XL 150mg 24hr  1        Bupropion Wellbutrin XL 300mg 24hr no             Buspirone buspar 10mg off         Buspiroine buspar 30mg off         Carbidopa/levodopa Sinemet 25/100 1/2   1/2 1/2 1/2        Citalopram celexa 20mg off         Gabapentin neurontin 400mg      1    Gabapentin neurontin 100mg off         Levomilnacipran Fetzima 20mg 24hr off         Levomilnacipran fetzima 40mg 24hr  off         LEvothyroxine synthroid levothyroid 75mcg 1               Lithium eskalith 300mg            2     Melatonin 10mg extended release (10mg B6)           1     Mirabegron myrbetriq 25mg 24hr ->50mg trial                Prednisolone pred forte ophthalm  off                Vibegron gemtesa 75mg  off                                                                                                                                                                                                                                            Plan:    Belching is better  Drinking water with orange juice to get medications down    No travel plans.     Dry eyes  Over the counter product  Pharmacy ([facility]) consultation and medication management  Please call the scheduling number I@ [phone] to set up an appointment with pharmacists Yue Mahajan or Yancy Wright.     Urinary urgency/frequency  Trial of 50mg of mirabegron  Physical therapy referral  Urology referral -     Denies constipation presently  Had a colonoscopy which was a disaster and had to do a second clean out  Had 3 polyps removed and will need to go back in 3 years.     [employer]  [email or web address]/  Therapist [place] - on line   medication manager   Silvia Faulkner  DNP, APRN, CNP, PMHNP-BC  Psychiatric Nurse Practitioner    Hopefully lithium will be reduced  Is on gabapentin neurontin 400mg at night  Using wellbutrin xl 150mg in the morning    Melatonin 10mg at night.    Dyskinesias are better with lower dose of sinemet  No freezing  No falls  Exercising - has a fitness class for parkinson patients   [employer] class  Walking weekly with friends.   Virtual class may join    Botox was done and not clear it was that helpful but will try a bigger dose.           Medications     730 8/830 1130 430p 730p 8p 10p   Botulinum toxin type B myobloc trial         Bupropion wellbutrin XL 150mg 24hr  1        Carbidopa/levodopa Sinemet 25/100 1/2   1/2 1/2 1/2        Gabapentin neurontin 400mg      1     LEvothyroxine synthroid levothyroid 75mcg 1               Lithium eskalith 300mg           2     Melatonin 10mg extended release (10mg B6)           1     Mirabegron myrbetriq 50mg trial                                                                                                                               Coding statement:   Medical Decision Making:  #  Chronic progressive medical conditions addressed  - see above --   Review and/or interpretation of unique test or documentation from a provider outside of neurology no   Independent historian provided additional details  yes I  Prescription drug management and review of potential side effects and/or monitoring for side effects  -- see above ---  Health impacted by social determinants of health  no    I have reviewed the note as documented above.  This accurately captures the substance of my conversation with the patient and total time spent preparing for visit, executing visit and completing visit on the day of the visit:  30 minutes.  The portion of this total time included face to face time 29 minutes.      Ramon Weston MD     ______________________________________    Last visit date and details:             ______________________________________      Patient was asked about 14 Review of systems including changes in vision (dry eyes, double vision), hearing, heart, lungs, musculoskeletal, depression, anxiety, snoring, RBD, insomnia, urinary frequency, urinary urgency, constipation, swallowing problems, hematological, ID, allergies, skin problems: seborrhea, endocrinological: thyroid, diabetes, cholesterol; balance, weight changes, and other neurological problems and these were not significant at this time except for   Patient Active Problem List   Diagnosis     Arteriovenous malformation (AVM)     Atypical nevus     Chest pain, unspecified     Colon adenomas     E. coli UTI     Hypothyroidism     Fibroids     Lump or mass in breast     Major depressive  disorder, recurrent episode (H24)     Parkinson's disease     Psoriasis     Seasonal allergies     Severe episode of recurrent major depressive disorder, with psychotic features (H)     Varicose veins of other specified sites     Urinary incontinence     Adenomatous polyp of colon     Seasonal allergic rhinitis     Major depressive disorder     Dyskinesia due to Parkinson's disease     Sialorrhea        No Known Allergies  Past Surgical History:   Procedure Laterality Date     breast surgery  2012    central duct excision right breast      SECTION      x2     EYE SURGERY Bilateral     cataract surgery     ORAL SURGERY IP CONSULT Left     oral mucosal lesion left     Past Medical History:   Diagnosis Date     Urinary incontinence 2023     Social History     Socioeconomic History     Marital status:      Spouse name: Not on file     Number of children: Not on file     Years of education: Not on file     Highest education level: Not on file   Occupational History     Not on file   Tobacco Use     Smoking status: Never     Smokeless tobacco: Never   Substance and Sexual Activity     Alcohol use: Yes     Drug use: Not on file     Sexual activity: Not on file   Other Topics Concern     Not on file   Social History Narrative     Not on file     Social Determinants of Health     Financial Resource Strain: Not on file   Food Insecurity: Not on file   Transportation Needs: Not on file   Physical Activity: Not on file   Stress: Not on file   Social Connections: Not on file   Interpersonal Safety: Not on file   Housing Stability: Not on file       Drug and lactation database from the [place] National Library of Medicine:  http://toxnet.nlm.nih.gov/cgi-bin/sis/htmlgen?LACT      B/P: Data Unavailable, T: Data Unavailable, P: Data Unavailable, R: Data Unavailable 0 lbs 0 oz  There were no vitals taken for this visit., There is no height or weight on file to calculate BMI.  Medications and Vitals not listed  above were documented in the cart and reviewed by me.     Current Outpatient Medications   Medication Sig Dispense Refill     buPROPion (WELLBUTRIN XL) 150 MG 24 hr tablet Take 150 mg by mouth every morning       gabapentin (NEURONTIN) 400 MG capsule Take 400 mg by mouth daily       vibegron (GEMTESA) 75 MG TABS tablet Take 75 mg by mouth daily       buPROPion (WELLBUTRIN XL) 300 MG 24 hr tablet Take 300 mg by mouth daily       busPIRone (BUSPAR) 10 MG tablet Take 10 mg by mouth 3 times daily       busPIRone HCl (BUSPAR) 30 MG tablet See instructions       carbidopa-levodopa (SINEMET)  MG tablet Take one tablet at each of 7:30am, 11:30am, 4:30pm and 0.5 tablet at 7:30pm 315 tablet 1     citalopram (CELEXA) 20 MG tablet Take 20 mg by mouth daily       gabapentin (NEURONTIN) 100 MG capsule TAKE 1 TO 2 CAPSULES BY MOUTH AT BEDTIME       levomilnacipran (FETZIMA) 20 MG 24 hr capsule Take 20 mg by mouth daily       levomilnacipran (FETZIMA) 40 MG 24 hr capsule Take 40 mg by mouth daily       levothyroxine (SYNTHROID/LEVOTHROID) 75 MCG tablet Take 1 tablet by mouth daily       lithium (ESKALITH) 300 MG tablet Take 600 mg by mouth At Bedtime       Melatonin 10 MG TABS tablet Take 10 mg by mouth nightly as needed for sleep       mirabegron (MYRBETRIQ) 25 MG 24 hr tablet Take 1 tablet (25 mg) by mouth daily 30 tablet 11     prednisoLONE acetate (PRED FORTE) 1 % ophthalmic suspension INSTILL 1 DROP INTO AFFECTED EYE(S) BY OPHTHALMIC ROUTE 2 TIMES PER DAY           Ramon Weston MD        Again, thank you for allowing me to participate in the care of your patient.      Sincerely,    Ramon Weston MD

## 2023-11-28 NOTE — PATIENT INSTRUCTIONS
Belching is better  Drinking water with orange juice to get medications down    No travel plans.     Dry eyes  Over the counter product  Pharmacy ([facility]) consultation and medication management  Please call the scheduling number I@ [phone] to set up an appointment with pharmacists Yue Mahajan or Yancy Wright.     Urinary urgency/frequency  Trial of 50mg of mirabegron  Physical therapy referral  Urology referral -     Denies constipation presently  Had a colonoscopy which was a disaster and had to do a second clean out  Had 3 polyps removed and will need to go back in 3 years.     [employer] [facility]  [email or web address]  Therapist [place] - on line   medication manager   Silvia Faulkner  DNP, APRN, CNP, PMHNP-BC  Psychiatric Nurse Practitioner    Hopefully lithium will be reduced  Is on gabapentin neurontin 400mg at night  Using wellbutrin xl 150mg in the morning    Melatonin 10mg at night.    Dyskinesias are better with lower dose of sinemet  No freezing  No falls  Exercising - has a fitness class for parkinson patients   [employer] class  Walking weekly with friends.   Virtual class may join    Botox was done and not clear it was that helpful but will try a bigger dose.           Medications     730 8/830 1130 430p 730p 8p 10p   Botulinum toxin type B myobloc trial         Bupropion wellbutrin XL 150mg 24hr  1        Carbidopa/levodopa Sinemet 25/100 1/2   1/2 1/2 1/2        Gabapentin neurontin 400mg      1    LEvothyroxine synthroid levothyroid 75mcg 1               Lithium eskalith 300mg           2     Melatonin 10mg extended release (10mg B6)           1     Mirabegron myrbetriq 50mg trial

## 2023-12-14 ENCOUNTER — THERAPY VISIT (OUTPATIENT)
Dept: PHYSICAL THERAPY | Facility: REHABILITATION | Age: 63
End: 2023-12-14
Attending: PSYCHIATRY & NEUROLOGY
Payer: COMMERCIAL

## 2023-12-14 DIAGNOSIS — N81.89 PELVIC FLOOR WEAKNESS IN FEMALE: ICD-10-CM

## 2023-12-14 DIAGNOSIS — R39.9 LOWER URINARY TRACT SYMPTOMS (LUTS): Primary | ICD-10-CM

## 2023-12-14 PROCEDURE — 97161 PT EVAL LOW COMPLEX 20 MIN: CPT | Mod: GP

## 2023-12-14 PROCEDURE — 97110 THERAPEUTIC EXERCISES: CPT | Mod: GP

## 2023-12-14 PROCEDURE — 97530 THERAPEUTIC ACTIVITIES: CPT | Mod: GP

## 2023-12-14 PROCEDURE — 97112 NEUROMUSCULAR REEDUCATION: CPT | Mod: GP

## 2023-12-14 NOTE — PROGRESS NOTES
PHYSICAL THERAPY EVALUATION  Type of Visit: Evaluation    See electronic medical record for Abuse and Falls Screening details.    Subjective       Presenting condition or subjective complaint: Constant leakage need to go to bathroom  Am going to bathroom 4 times at night  Date of onset: 11/28/23 (order date)    Relevant medical history: Parkinson s Disease   Dates & types of surgery: C sections 1988 and 1991,    Prior diagnostic imaging/testing results:       Prior therapy history for the same diagnosis, illness or injury: No      Prior Level of Function  Transfers: Independent however moves slowly, difficulty turning onto her side on mat    Living Environment  Social support: With a significant other or spouse   Type of home: Multi-level   Stairs to enter the home: No       Ramp: No   Stairs inside the home: Yes 3 Is there a railing: Yes   Help at home: Home and Yard maintenance tasks; Assist for driving and community activities    Employment:      Hobbies/Interests:  walking with friends    Patient goals for therapy: Stop wearing pads or Depends    Pain assessment: Location: low back, B shoulders, L arm/Rating: 3/10  back    Pt is 62yo female presenting today with c/o lower urinary tract symptoms consistent with urge and frequency. Pt reports that she often has a strong urge to urinate 6-8x per day, often unable to hold it and leaking before she can make it to the bathroom. Sometimes it is minimal leakage whereas other times it is moderate. Frequently these urges are associated with drinking water. Starting at 7:30am, she takes medication for her Parkinsonism every 4 hours with the last medication/cup of water at 7:30pm. She gets up about every 2 hours to go to the bathroom at night, although partly due to restless legs. Pt does experience some leakage with laughing, none with coughing or sit<>stand. Constipation has improved since her colonoscopy although it is only every other day that she has bowel movement. Pt  reports having ongoing shoulder and back pain if she does too much or stands too long. Has tried Gemtesa but doesn't seem to have worked, is currently taking 50mg Myrbetriq with no improvement just yet. Pt has been wearing menstrual pads or Depends and would like to not use these anymore        Objective      PELVIC EVALUATION  ADDITIONAL HISTORY:  Sex assigned at birth:  female    Bladder History:  Feels bladder filling:  yes  Triggers for feeling of inability to wait to go to the bathroom:    water  How long can you wait to urinate:  not at all  Gets up at night to urinate:    every 2 hours  Volume of urine usually released:   mod  Other issues:    Fluid intake per day:     40oz per day  Medications taken for bladder:     Gemtesa, Myrbetriq  Activities causing urine leak:    urge, sometimes laughing  Amount of urine typically leaked:  min-mod  Pads used to help with leaking:      yes (encouraged to use incontinence pads instead of menstrual)    Bowel History:  Frequency of bowel movement:  every other day  Consistency of stool:    fairly soft and formed - before it was hard when she had constipation  Ignores the urge to defecate:  no  Other bowel issues:  Pt reported none    Sexual Function History: no significant issues reported  Pelvic pain:    no pain reported    Discussed reason for referral regarding pelvic health needs and external/internal pelvic floor muscle examination with patient/guardian.  Opportunity provided to ask questions and verbal consent for assessment and intervention was given.    POSTURE: Standing Posture: Rounded shoulders, Forward head, Thoracic kyphosis increased  Sitting Posture: Rounded shoulders, Forward head, Thoracic kyphosis increased  LUMBAR SCREEN: WNL  Pain: Pt reports some low back/sacral pain at times but none today  HIP SCREEN:  Strength:   Pain: - none + mild ++ moderate +++ severe  Strength Scale: 0-5/5 Left Right   Hip Flexion 4- 4-   Hip Extension 3+ 3+   Hip Abduction 3+  4-   Knee Flexion 5 5 initially with shin pain (ok down at ankle) + varicose veins   Knee Extension 5 5      Functional Strength Testing: Unilateral hip flexion PSIS: +R  R iliac crest higher in standing    PELVIC/SI SCREEN:  WNL   PAIN PROVOCATION TEST: WNL  PELVIS/SI SPECIAL TESTS: WNL  BREATHING SYMMETRY: Decreased rib cage mobility    PELVIC EXAM  External Visual Inspection:  At rest: Normal  With voluntary pelvic floor contraction: Perineal elevation  Relaxation of PFM: Partial/delayed relaxation  With intra-abdominal pressure: Cough: Perineal elevation  Valsalva: Perineal descent  Bearing down as defecation: Perineal descent    Integumentary:   Introitus: Unremarkable    External Digital Palpation per Perineum: pt reported no pain/tenderness    Internal Digital Palpation:  Per Vagina:  Tone: low  Digital Muscle Performance: P (Power): 4  E (Endurance): 5  R (Repetitions): 4  F (Fast Twitch): 6  Compensations: Adductors, Breath holding  Relaxation Post-Contraction: Partial/delayed relaxation     Pt losing power of contraction when reminded to breathe.      Pelvic Organ Prolapse:   no    Abdominal Activation/Strength: SLR: more difficulty w/ L leg, tilting pelvis  Lasegue's: (-)    Fascial Tension/Restriction/Tone:  WNL    DERMATOMES: WNL  DTR S: WNL    Assessment & Plan   CLINICAL IMPRESSIONS  Medical Diagnosis: Lower urinary tract symptoms    Treatment Diagnosis: Pelvic floor weakness   Impression/Assessment: Patient is a 63 year old female with lower urinary tract symptoms consistent with urge and frequency.  The following significant findings have been identified: Decreased ROM/flexibility, Decreased joint mobility, Decreased strength, Impaired balance, Decreased proprioception, Impaired muscle performance, Decreased activity tolerance, and Impaired posture. These impairments interfere with their ability to perform self care tasks, recreational activities, household mobility, and community mobility as  compared to previous level of function.     Clinical Decision Making (Complexity):  Clinical Presentation: Stable/Uncomplicated  Clinical Presentation Rationale: based on medical and personal factors listed in PT evaluation  Clinical Decision Making (Complexity): Low complexity    PLAN OF CARE  Treatment Interventions:  Interventions: Manual Therapy, Neuromuscular Re-education, Therapeutic Activity, Therapeutic Exercise, Self-Care/Home Management  Modalities: Biofeedback    Long Term Goals     PT Goal 1  Goal Identifier: HEP  Goal Description: Pt will be independent with HEP in order to manage urinary symptoms  Rationale: to maximize safety and independence within the community  Target Date: 12/28/23  PT Goal 2  Goal Identifier: Urinary leakage  Goal Description: Pt will improve episodes of uncontrolled urinary urge from 8x per day to 4x per day with minimal to no leakage  Rationale: to maximize safety and independence within the community;to maximize safety and independence within the home (so pt may return to walking)  Target Date: 03/05/24  PT Goal 3  Goal Identifier: endurance  Goal Description: Pt's endurance will improve from five PF contractions of 5 second holds to 10 PF contractions of 10s holds in order to improve strength and decrease leakage  Rationale: to maximize safety and independence with performance of ADLs and functional tasks  Target Date: 02/01/24      Frequency of Treatment: 1x/week  Duration of Treatment: 12 weeks    Education Assessment:   Learner/Method: Patient;Demonstration;Listening;Pictures/Video    Risks and benefits of evaluation/treatment have been explained.   Patient/Family/caregiver agrees with Plan of Care.     Evaluation Time:        Signing Clinician: Sandrine Shipley PT      [facility]                                                                                   OUTPATIENT PHYSICAL THERAPY      PLAN OF TREATMENT FOR OUTPATIENT REHABILITATION    Patient's Last Name, First Name, Fabiana Painter YOB: 1960   Provider's Name   [facility]   Medical Record No.  [id number]     Onset Date: 11/28/23 (order date)  Start of Care Date: 12/14/23     Medical Diagnosis:  Lower urinary tract symptoms      PT Treatment Diagnosis:  Pelvic floor weakness Plan of Treatment  Frequency/Duration: 1x/week/ 12 weeks    Certification date from 12/14/23 to 03/05/24         See note for plan of treatment details and functional goals     Sandrine Shipley, PT                         I CERTIFY THE NEED FOR THESE SERVICES FURNISHED UNDER        THIS PLAN OF TREATMENT AND WHILE UNDER MY CARE .             Physician Signature               Date  Saranya with plan  Ramon weston md  December 14, 2023    X_____________________________________________________                  Referring Provider:  Ramon Weston    Initial Assessment  See Epic Evaluation- Start of Care Date: 12/14/23

## 2023-12-18 ENCOUNTER — THERAPY VISIT (OUTPATIENT)
Dept: PHYSICAL THERAPY | Facility: REHABILITATION | Age: 63
End: 2023-12-18
Payer: COMMERCIAL

## 2023-12-18 DIAGNOSIS — G20.A1 PARKINSON'S DISEASE, UNSPECIFIED WHETHER DYSKINESIA PRESENT, UNSPECIFIED WHETHER MANIFESTATIONS FLUCTUATE (H): Primary | ICD-10-CM

## 2023-12-18 DIAGNOSIS — R39.9 LOWER URINARY TRACT SYMPTOMS (LUTS): Primary | ICD-10-CM

## 2023-12-18 PROCEDURE — 97530 THERAPEUTIC ACTIVITIES: CPT | Mod: GP

## 2023-12-18 PROCEDURE — 97110 THERAPEUTIC EXERCISES: CPT | Mod: 59

## 2023-12-18 PROCEDURE — 97112 NEUROMUSCULAR REEDUCATION: CPT | Mod: 59

## 2024-01-04 ENCOUNTER — THERAPY VISIT (OUTPATIENT)
Dept: PHYSICAL THERAPY | Facility: REHABILITATION | Age: 64
End: 2024-01-04
Attending: PSYCHIATRY & NEUROLOGY
Payer: COMMERCIAL

## 2024-01-04 DIAGNOSIS — R39.9 LOWER URINARY TRACT SYMPTOMS (LUTS): Primary | ICD-10-CM

## 2024-01-04 PROCEDURE — 97110 THERAPEUTIC EXERCISES: CPT | Mod: GP

## 2024-01-04 PROCEDURE — 97530 THERAPEUTIC ACTIVITIES: CPT | Mod: GP

## 2024-01-04 PROCEDURE — 97112 NEUROMUSCULAR REEDUCATION: CPT | Mod: GP

## 2024-01-05 ENCOUNTER — VIRTUAL VISIT (OUTPATIENT)
Dept: NEUROLOGY | Facility: CLINIC | Age: 64
End: 2024-01-05
Attending: PSYCHIATRY & NEUROLOGY
Payer: COMMERCIAL

## 2024-01-05 DIAGNOSIS — G20.A1 PARKINSON'S DISEASE, UNSPECIFIED WHETHER DYSKINESIA PRESENT, UNSPECIFIED WHETHER MANIFESTATIONS FLUCTUATE (H): Primary | ICD-10-CM

## 2024-01-05 DIAGNOSIS — F33.0 MILD EPISODE OF RECURRENT MAJOR DEPRESSIVE DISORDER (H): ICD-10-CM

## 2024-01-05 DIAGNOSIS — R39.9 LOWER URINARY TRACT SYMPTOMS: ICD-10-CM

## 2024-01-05 DIAGNOSIS — E03.9 HYPOTHYROIDISM, UNSPECIFIED TYPE: ICD-10-CM

## 2024-01-05 NOTE — Clinical Note
1/5/2024       RE: Fabiana Hu  [address]  [place] [facility] [address]     Dear Colleague,    Thank you for referring your patient, Fabiana Hu, to the [facility] [place] [facility]. Please see a copy of my visit note below.    Medication Therapy Management (MTM) Encounter    ASSESSMENT:                            Medication Adherence/Access: No issues identified    ***:   ***    PLAN:                            -Start taking 0.5-1 tablet right at bedtime  -After one week, ok to increase morning dose from 0.5 to 1 tablet    Medication Dose Timing   6:30am 11:30am 4:30pm 7:30pm 8-9pm 10pm   Carbidopa/levodopa  0.5 0.5 0.5 0.5  0.5-1   Gabapentin 400mg     1        Continue taking medication for another month to see if helpful, then could consider stopping.     Follow-up: 2/9/24    SUBJECTIVE/OBJECTIVE:                          Fabiana Hu is a 63 year old female contacted via secure video for a follow-up visit from 6/29/23.       Reason for visit: med review.    Allergies/ADRs: Reviewed in chart  Past Medical History: Reviewed in chart  Tobacco: She reports that she has never smoked. She has never used smokeless tobacco.  Alcohol: not currently using    Medication Adherence/Access: no issues reported  Levothyroxyine at 6am, then carbidopa/levodopa at 6:30am  7:30pm dose may be late if she has other things going on    She is drinking 8oz water with each carbidopa/levodopa dose and 2-3 other times during the day     Parkinsonism:   Medication Dose Timing   6:30am 11:30am 4:30pm 7:30pm 8-9pm    Carbidopa/levodopa  0.5 0.5 0.5 0.5     Gabapentin 400mg     1                        She has slowly been reducing carbidopa/levodopa over the last 6 months to help reduce dyskinesia/leg shaking when resting in the evening, which is now resolved. She has noticed that putting on shoes or coat is taking longer than it used. She is able to get  "around ok during the day. She is maybe notice a little shuffling, but not much.   Thinks she is more sluggish in some ways and has trouble moving around and getting comfortable in bed.   She is going to bed around 10pm and sometimes has trouble getting to sleep. She wakes a couple times during the night usually to go to the bathroom and does feel stiff during the night. Infrequently notices issues with restless legs. Wakes around 6am and does notice some stiffness, but not impairing.       Pt is currently taking carbidopa/levodopa 25/100mg three times daily (7:30am, 11:30am, 4:30pm) and 1/2 tab at 7:30pm (added at last visit)     Pt reported that she has often been forgetting the newly added dose at 7:30pm, taking it a hour or two later about 4 days per week. She thinks it might be helping some with the jerking in the evening, but not always consistent. She has not noticed any side effects since adding that dose and would like to continue.  She does not generally notice any tremor/shakiness or stiffness/difficulty with gait.  She does have some questions about sleep and plans to follow up with her Sleep Medicine provider.    Hypothyroidism:   Levothyroxine 75 mcg daily  Patient is having the following symptoms: none.   4/3/23: 2.3    Urinary:   -mirabegron 50mg daily x 1 month  She is going PT for pelvic floor exercises that started just a few weeks ago. Feels that she has to go to the bathroom \"constantly during the day and night.\" Gets up twice during the night to go to the bathroom. Doesn't think the medication has made any difference.     Depression:    -bupropion ER 150mg daily  -lithium IR 600mg bedtime  Patient reported that mood has been stable. Bupropion has been reduced in the last 6 months, which has gone well. Last depressive episode 2021 into 2022. Lithium has helped her brother's depression, so they tried it as well. They are considering reducing lithium at some point in the future. She does notice a " "metallic taste from the lithium, bu no other concerns.         Today's Vitals: There were no vitals taken for this visit.  ----------------  {LAURENT?:650847}    I spent {mtm total time 3:142133} with this patient today{MTMpartdbillingquestion:701202}. { :220102}. A copy of the visit note was provided to the patient's provider(s).    A summary of these recommendations {GIVEN/NOT GIVEN:998468}.    ***    Telemedicine Visit Details  Type of service:  {telemedvisitmtm:363597::\"Telephone visit\"}  Start Time: {video/phone visit start time:152948}  End Time: {video/phone visit end time:152948}     Medication Therapy Recommendations  No medication therapy recommendations to display     Medication Therapy Management (MTM) Encounter    ASSESSMENT:                            Medication Adherence/Access: No issues identified    Parkinsonism:   Dose reductions have been helpful for reducing dyskinesia, but she is noticing a bit more bradykinesia/stiffness during the day and certainly overnight. Discussed adding in a dose right before bedtime that might help with overnight symptoms, ok to start with 0.5 tablet or take a full tablet depending on how she is feeling.     Medication Dose Timing   6:30am 11:30am 4:30pm 7:30pm 8-9pm   Carbidopa/levodopa 25/100 0.5 0.5 0.5 0.5    Gabapentin 400mg     1     She has slowly been reducing carbidopa/levodopa over the last 6 months to help reduce dyskinesia/leg shaking when resting in the evening, which is now resolved. She has noticed that putting on shoes or coat is taking longer than it used to. She is able to get around ok during the day. She is maybe notice a little shuffling, but not much.   Thinks she is more sluggish in some ways and has trouble moving around and getting comfortable in bed, which sometimes interrupts her sleep.   She is going to bed around 10pm and sometimes has trouble getting to sleep. She wakes a couple times during the night usually to go to the bathroom and does " feel stiff during the night. Infrequently notices issues with restless legs. Wakes around 6am and does notice some stiffness, but not impairing.     Hypothyroidism:   Stable. Continue current medication.    Urinary:   Recommend another month with the medication to see if helpful, then ok to stop. Discussed that she does not need to drink a full 8oz of water with each medication dose, which may especially help with needing to get up overnight.     Depression:    Stable. Continue current medication and follow up with psychiatry.    PLAN:                            -Start taking 0.5-1 tablet right at bedtime  -After one week, ok to increase morning dose from 0.5 to 1 tablet    Medication Dose Timing   6:30am 11:30am 4:30pm 7:30pm 8-9pm 10pm   Carbidopa/levodopa  0.5 0.5 0.5 0.5  0.5-1   Gabapentin 400mg     1        Continue taking medication for another month to see if helpful, then could consider stopping.     Follow-up: 2/9/24    SUBJECTIVE/OBJECTIVE:                          Fabiana Hu is a 63 year old female contacted via secure video for a follow-up visit from 6/29/23.       Reason for visit: med review.    Allergies/ADRs: Reviewed in chart  Past Medical History: Reviewed in chart  Tobacco: She reports that she has never smoked. She has never used smokeless tobacco.  Alcohol: not currently using    Medication Adherence/Access: no issues reported  Levothyroxyine at 6am, then carbidopa/levodopa at 6:30am  11:30am and 7:30pm dose may be late if she has other things going on  She is drinking 8oz water with each carbidopa/levodopa dose and 2-3 other times during the day     Parkinsonism:   Medication Dose Timing   6:30am 11:30am 4:30pm 7:30pm 8-9pm   Carbidopa/levodopa 25/100 0.5 0.5 0.5 0.5    Gabapentin 400mg     1     She has slowly been reducing carbidopa/levodopa over the last 6 months to help reduce dyskinesia/leg shaking when resting in the evening, which is now resolved. She has noticed that putting on shoes or  "coat is taking longer than it used to. She is able to get around ok during the day. She is maybe notice a little shuffling, but not much.   Thinks she is more sluggish in some ways and has trouble moving around and getting comfortable in bed, which sometimes interrupts her sleep.   She is going to bed around 10pm and sometimes has trouble getting to sleep. She wakes a couple times during the night usually to go to the bathroom and does feel stiff during the night. Infrequently notices issues with restless legs. Wakes around 6am and does notice some stiffness, but not impairing.     Hypothyroidism:   Levothyroxine 75 mcg daily  Patient is having the following symptoms: none.   TSH 4/3/23: 2.3    Urinary:   -mirabegron 50mg daily x 1 month  She just started doing PT for pelvic floor exercises a few weeks ago. Feels that she has to go to the bathroom \"constantly during the day and night.\" Gets up twice during the night to go to the bathroom. Doesn't think the medication has made any difference.     Depression:    -bupropion ER 150mg daily  -lithium IR 600mg bedtime  Patient reported that mood has been stable. Bupropion has been reduced in the last 6 months, which has gone well. Last depressive episode 2021 into 2022 and previous had been 10 years prior. Lithium has helped her brother's depression, so they tried it as well. They are considering reducing lithium at some point in the future. She does notice a metallic taste from the lithium, bu no other concerns.     Today's Vitals: There were no vitals taken for this visit.  ----------------      I spent 50 minutes with this patient today. All changes were made via collaborative practice agreement with Ramon Weston. A copy of the visit note was provided to the patient's provider(s).    A summary of these recommendations was sent via clinic portal.    Yancy Wright, PharmD  Medication Therapy Management Pharmacist  [facility] " Clinics    Telemedicine Visit Details  Type of service:  Video Conference via [employer]  Start Time:  1:30pm  End Time:  2:20pm     Medication Therapy Recommendations  No medication therapy recommendations to display       Again, thank you for allowing me to participate in the care of your patient.      Sincerely,    Yancy Wright, PharmD

## 2024-01-05 NOTE — PATIENT INSTRUCTIONS
"Recommendations from today's MTM visit:                                                    MTM (medication therapy management) is a service provided by a clinical pharmacist designed to help you get the most of out of your medicines.   Today we reviewed what your medicines are for, how to know if they are working, that your medicines are safe and how to make your medicine regimen as easy as possible.      -Start taking carbidopa/levodopa 0.5-1 tablet right at bedtime  -After one week, ok to increase morning dose from 0.5 to 1 tablet if needed    Medication Dose Timing   6:30am 11:30am 4:30pm 7:30pm 8-9pm 10pm   Carbidopa/levodopa 25/100 0.5 0.5 0.5 0.5  0.5-1   Gabapentin 400mg     1      Follow-up: 2/9/24    It was great speaking with you today.  I value your experience and would be very thankful for your time in providing feedback in our clinic survey. In the next few days, you may receive an email or text message from [employer] with a link to a survey related to your  clinical pharmacist.\"     To schedule another MTM appointment, please call the clinic directly or you may call the MTM scheduling line at [phone].    My Clinical Pharmacist's contact information:                                                      Please feel free to contact me with any questions or concerns you have.      Yancy Wright, PharmD  Medication Therapy Management Pharmacist  [facility]      "

## 2024-01-05 NOTE — Clinical Note
-Start taking carbidopa/levodopa 0.5-1 tablet right at bedtime -After one week, ok to increase morning dose from 0.5 to 1 tablet if needed

## 2024-01-05 NOTE — PROGRESS NOTES
Medication Therapy Management (MTM) Encounter    ASSESSMENT:                            Medication Adherence/Access: No issues identified    Parkinsonism:   Dose reductions have been helpful for reducing dyskinesia, but she is noticing a bit more bradykinesia/stiffness during the day and certainly overnight. Discussed adding in a dose right before bedtime that might help with overnight symptoms, ok to start with 0.5 tablet or take a full tablet depending on how she is feeling. If helpful, could consider switching ER dose before bedtime as well.  Could consider increasing the morning dose slightly if adding a bedtime dose doesn't make a difference.    Hypothyroidism:   Stable. Continue current medication.    Urinary:   Recommend another month with the medication to see if helpful, then ok to stop. Discussed that she does not need to drink a full 8oz of water with each medication dose, which may especially help with needing to get up overnight.     Depression:    Stable. Continue current medication and follow up with psychiatry.    PLAN:                            -Start taking carbidopa/levodopa 0.5-1 tablet right at bedtime  -After one week, ok to increase morning dose from 0.5 to 1 tablet if needed    Medication Dose Timing   6:30am 11:30am 4:30pm 7:30pm 8-9pm 10pm   Carbidopa/levodopa 25/100 0.5 0.5 0.5 0.5  0.5-1   Gabapentin 400mg     1      Follow-up: 2/9/24    SUBJECTIVE/OBJECTIVE:                          Fabiana Hu is a 63 year old female contacted via secure video for a follow-up visit from 6/29/23.       Reason for visit: med review.    Allergies/ADRs: Reviewed in chart  Past Medical History: Reviewed in chart  Tobacco: She reports that she has never smoked. She has never used smokeless tobacco.  Alcohol: not currently using    Medication Adherence/Access: no issues reported  Levothyroxyine at 6am, then carbidopa/levodopa at 6:30am  11:30am and 7:30pm dose may be late if she has other things going on  She  "is drinking 8oz water with each carbidopa/levodopa dose and 2-3 other times during the day     Parkinsonism:   Medication Dose Timing   6:30am 11:30am 4:30pm 7:30pm 8-9pm   Carbidopa/levodopa 25/100 0.5 0.5 0.5 0.5    Gabapentin 400mg     1     She has slowly been reducing carbidopa/levodopa over the last 6 months to help reduce dyskinesia/leg shaking when resting in the evening, which is now resolved. She has noticed that putting on shoes or coat is taking longer than it used to. She is able to get around ok during the day. She is maybe notice a little shuffling, but not much.   Thinks she is more sluggish in some ways and has trouble moving around and getting comfortable in bed, which sometimes interrupts her sleep.   She is going to bed around 10pm and sometimes has trouble getting to sleep. She wakes a couple times during the night usually to go to the bathroom and does feel stiff during the night. Infrequently notices issues with restless legs. Wakes around 6am and does notice some stiffness, but not impairing.     Hypothyroidism:   Levothyroxine 75 mcg daily  Patient is having the following symptoms: none.   TSH 4/3/23: 2.3    Urinary:   -mirabegron 50mg daily x 1 month  She just started doing PT for pelvic floor exercises a few weeks ago. Feels that she has to go to the bathroom \"constantly during the day and night.\" Gets up twice during the night to go to the bathroom. Doesn't think the medication has made any difference.     Depression:    -bupropion ER 150mg daily  -lithium IR 600mg bedtime  Patient reported that mood has been stable. Bupropion has been reduced in the last 6 months, which has gone well. Last depressive episode 2021 into 2022 and previous had been 10 years prior. Lithium has helped her brother's depression, so they tried it as well. They are considering reducing lithium at some point in the future. She does notice a metallic taste from the lithium, bu no other concerns. "     ----------------      I spent 50 minutes with this patient today. All changes were made via collaborative practice agreement with Ramon Weston. A copy of the visit note was provided to the patient's provider(s).    A summary of these recommendations was sent via clinic portal.    Yancy Wright PharmD  Medication Therapy Management Pharmacist  [facility]    Telemedicine Visit Details  Type of service:  Video Conference via [employer]  Start Time:  1:30pm  End Time:  2:20pm     Medication Therapy Recommendations  Parkinson's disease    Current Medication: carbidopa-levodopa (SINEMET)  MG tablet   Rationale: Dose too low - Dosage too low - Effectiveness   Recommendation: Increase Frequency - start taking 0.5-1 tab at bedtime and may increase morning dose   Status: Accepted per CPA

## 2024-01-11 ENCOUNTER — THERAPY VISIT (OUTPATIENT)
Dept: PHYSICAL THERAPY | Facility: REHABILITATION | Age: 64
End: 2024-01-11
Attending: PSYCHIATRY & NEUROLOGY
Payer: COMMERCIAL

## 2024-01-11 DIAGNOSIS — R39.9 LOWER URINARY TRACT SYMPTOMS (LUTS): Primary | ICD-10-CM

## 2024-01-11 PROCEDURE — 97112 NEUROMUSCULAR REEDUCATION: CPT | Mod: GP

## 2024-01-11 PROCEDURE — 97530 THERAPEUTIC ACTIVITIES: CPT | Mod: GP

## 2024-01-11 PROCEDURE — 97110 THERAPEUTIC EXERCISES: CPT | Mod: GP

## 2024-01-23 ENCOUNTER — OFFICE VISIT (OUTPATIENT)
Dept: NEUROLOGY | Facility: CLINIC | Age: 64
End: 2024-01-23
Payer: COMMERCIAL

## 2024-01-23 DIAGNOSIS — K11.7 SIALORRHEA: Primary | ICD-10-CM

## 2024-01-23 PROCEDURE — 64611 CHEMODENERV SALIV GLANDS: CPT | Performed by: PSYCHIATRY & NEUROLOGY

## 2024-01-23 NOTE — PROGRESS NOTES
Movement Disorders Botulinum Toxin Clinic Note    History of Present Illness:  Fabiana Hu is a 63 year old female who presents to clinic for botulinum toxin injections for treatment of sialorrhea secondary to Parkinson's disease.     Date of last injection: 10/24/23    Onset of effect after injections:  A week or two after injections    Response from last injections:  Reported brief slight improvement in drooling. Her chin was maybe a little dryer but she is hoping for better response in the future.     Improvement in function/activity: Hoping for more improvement in the future - would like to have to wipe face and clothes less frequently.     Wearing off: She noticed wearing off by 4-6 weeks after the injections.     Side effects: She reports no side effects. She has dry mouth at nighttime at baseline - this was not any worse.     Additional interval history: No changes in her health since she was last seen. She was last seen by Amy Wright 24 at which time she had 0.5-1 tab CD/LD added to her regimen at 10pm.     Patient denies new medical diagnoses, illnesses, hospitalizations, emergency room visits, and injuries since the previous injection with botulinum neurotoxin.    Physical Examination:  Vital Signs:   vitals were not taken for this visit.       BOTULINUM NEUROTOXIN INJECTION PROCEDURES:    VERIFICATION OF PATIENT IDENTIFICATION AND PROCEDURE     Initials   Patient Name [facility]   Patient  [facility]   Procedure Verified by: [facility]     Prior to the start of the procedure and with procedural staff participation, I verbally confirmed the patient s identity using two indicators, relevant allergies, that the procedure was appropriate and matched the consent or emergent situation, and that the correct equipment/implants were available. Immediately prior to starting the procedure I conducted the Time Out with the procedural staff and re-confirmed the patient s name, procedure, and site/side. (The Joint Commission  universal protocol was followed.)  Yes    Sedation (Moderate or Deep): None      Above assessments performed by:  Fellow: Elis Garcia MD    Provider: Jammie West MD    INDICATION/S FOR PROCEDURE/S:  Fabiana Hu is a 63 year old year old patient with sialorrhea secondary to Parkinson's disease with associated symptoms of bothersome drooling on clothes, face .     Her baseline symptoms have been recalcitrant to oral medications and conservative therapy.  She is here today for an injection of Myobloc.      GOAL OF PROCEDURE:  The goal of this procedure is to decrease excessive drooling  associated with sialorrhea.    TOTAL DOSE ADMINISTERED:  Dose Administered:  1500 units Myobloc    Diluent Used:  Preservative Free Normal Saline  Total Volume of Diluent Used:  0.5 mL    CONSENT:  The risks, benefits, and treatment options were discussed with Fabiana Hu and she agreed to proceed.      Written consent was obtained by [facility].     EQUIPMENT USED:  Needle-30 gauge    SKIN PREPARATION:  Skin preparation was performed using an alcohol wipe.    GUIDANCE DESCRIPTION:  Guidance was not utilized for this procedure     AREA/MUSCLE INJECTED:    Muscles Injected Units Injected Number of Injections   Right Parotid 1000 (750) 1   Left Parotid 1000 (750) 1             Total Units Injected: 2000    Unavoidable Waste: 500    Total Units Billed 2500    ( ) = previous dose    The patient tolerated the injections without difficulty.    Assessment:    Fabiana Hu is a 63 year old female with sialorrhea secondary to Parkinson's disease.  Today we did repeat botulinum toxin injections.    Plan  Message via [employer] or call the clinic in 4-6 weeks for an update  Avoid heat and cold pack and massaging the areas injected for 24 hours post injections  Follow-up in 3 and 6 months to consider repeat injections    Injections performed by movement disorder attending, Dr. Brett Garcia MD  Movement Disorders Fellow      Neurology  Attending Attestation:   I personally saw this patient with our fellow and agree with the their findings and plan of care as documented in the note. I personally performed salient aspects of the history and neurological examination as well as the injections.    Jammie West MD   of Neurology  Movement Disorders Division

## 2024-01-23 NOTE — LETTER
2024       RE: Fabiana Hu   [address]  [facility] [place] [facility] [address]       Dear Colleague,    Thank you for referring your patient, Fabiana Hu, to the [facility] [place] [facility]. Please see a copy of my visit note below.    Movement Disorders Botulinum Toxin Clinic Note    History of Present Illness:  Fabiana Hu is a 63 year old female who presents to clinic for botulinum toxin injections for treatment of sialorrhea secondary to Parkinson's disease.     Date of last injection: 10/24/23    Onset of effect after injections:  A week or two after injections    Response from last injections:  Reported brief slight improvement in drooling. Her chin was maybe a little dryer but she is hoping for better response in the future.     Improvement in function/activity: Hoping for more improvement in the future - would like to have to wipe face and clothes less frequently.     Wearing off: She noticed wearing off by 4-6 weeks after the injections.     Side effects: She reports no side effects. She has dry mouth at nighttime at baseline - this was not any worse.     Additional interval history: No changes in her health since she was last seen. She was last seen by Amy Wright 24 at which time she had 0.5-1 tab CD/LD added to her regimen at 10pm.     Patient denies new medical diagnoses, illnesses, hospitalizations, emergency room visits, and injuries since the previous injection with botulinum neurotoxin.    Physical Examination:  Vital Signs:   vitals were not taken for this visit.       BOTULINUM NEUROTOXIN INJECTION PROCEDURES:    VERIFICATION OF PATIENT IDENTIFICATION AND PROCEDURE     Initials   Patient Name [facility]   Patient  [facility]   Procedure Verified by: [facility]     Prior to the start of the procedure and with procedural staff participation, I verbally confirmed the patient s identity using two indicators, relevant allergies, that the  procedure was appropriate and matched the consent or emergent situation, and that the correct equipment/implants were available. Immediately prior to starting the procedure I conducted the Time Out with the procedural staff and re-confirmed the patient s name, procedure, and site/side. (The Joint Commission universal protocol was followed.)  Yes    Sedation (Moderate or Deep): None      Above assessments performed by:  Fellow: Elis Garcia MD    Provider: Jammie West MD    INDICATION/S FOR PROCEDURE/S:  Fabiana Hu is a 63 year old year old patient with sialorrhea secondary to Parkinson's disease with associated symptoms of bothersome drooling on clothes, face .     Her baseline symptoms have been recalcitrant to oral medications and conservative therapy.  She is here today for an injection of Myobloc.      GOAL OF PROCEDURE:  The goal of this procedure is to decrease excessive drooling  associated with sialorrhea.    TOTAL DOSE ADMINISTERED:  Dose Administered:  1500 units Myobloc    Diluent Used:  Preservative Free Normal Saline  Total Volume of Diluent Used:  0.5 mL    CONSENT:  The risks, benefits, and treatment options were discussed with Fabiana Hu and she agreed to proceed.      Written consent was obtained by [facility].     EQUIPMENT USED:  Needle-30 gauge    SKIN PREPARATION:  Skin preparation was performed using an alcohol wipe.    GUIDANCE DESCRIPTION:  Guidance was not utilized for this procedure     AREA/MUSCLE INJECTED:    Muscles Injected Units Injected Number of Injections   Right Parotid 1000 (750) 1   Left Parotid 1000 (750) 1             Total Units Injected: 2000    Unavoidable Waste: 500    Total Units Billed 2500    ( ) = previous dose    The patient tolerated the injections without difficulty.    Assessment:    Fabiana Hu is a 63 year old female with sialorrhea secondary to Parkinson's disease.  Today we did repeat botulinum toxin injections.    Plan  Message via [employer] or call the clinic in  4-6 weeks for an update  Avoid heat and cold pack and massaging the areas injected for 24 hours post injections  Follow-up in 3 and 6 months to consider repeat injections    Injections performed by movement disorder attending, Dr. Brett Garcia MD  Movement Disorders Fellow      Neurology Attending Attestation:   I personally saw this patient with our fellow and agree with the their findings and plan of care as documented in the note. I personally performed salient aspects of the history and neurological examination as well as the injections.        Again, thank you for allowing me to participate in the care of your patient.      Sincerely,    Jammie West MD

## 2024-01-25 ENCOUNTER — THERAPY VISIT (OUTPATIENT)
Dept: PHYSICAL THERAPY | Facility: REHABILITATION | Age: 64
End: 2024-01-25
Payer: COMMERCIAL

## 2024-01-25 DIAGNOSIS — R39.9 LOWER URINARY TRACT SYMPTOMS (LUTS): Primary | ICD-10-CM

## 2024-01-25 PROCEDURE — 97110 THERAPEUTIC EXERCISES: CPT | Mod: GP

## 2024-01-25 PROCEDURE — 97112 NEUROMUSCULAR REEDUCATION: CPT | Mod: GP

## 2024-02-09 ENCOUNTER — VIRTUAL VISIT (OUTPATIENT)
Dept: NEUROLOGY | Facility: CLINIC | Age: 64
End: 2024-02-09
Attending: PSYCHIATRY & NEUROLOGY
Payer: COMMERCIAL

## 2024-02-09 DIAGNOSIS — G20.C PARKINSONISM, UNSPECIFIED PARKINSONISM TYPE (H): Primary | ICD-10-CM

## 2024-02-09 NOTE — Clinical Note
2/9/2024       RE: Fabiana Hu  [address]  [place] [address]     Dear Colleague,    Thank you for referring your patient, Fabiana Hu, to the [facility]. Please see a copy of my visit note below.    Medication Therapy Management (MTM) Encounter    ASSESSMENT:                            Medication Adherence/Access: {adherencechoices:826567}    ***:   ***    Parkinsonism:   Dose reductions have been helpful for reducing dyskinesia, but she is noticing a bit more bradykinesia/stiffness during the day and certainly overnight. Discussed adding in a dose right before bedtime that might help with overnight symptoms, ok to start with 0.5 tablet or take a full tablet depending on how she is feeling. If helpful, could consider switching ER dose before bedtime as well.  Could consider increasing the morning dose slightly if adding a bedtime dose doesn't make a difference.    PLAN:                            ***    Follow-up: {followuptest2:395093}    SUBJECTIVE/OBJECTIVE:                          Fabiana Hu is a 63 year old female { :763176} for {mtmvisit:228092}     Reason for visit: ***.    Allergies/ADRs: {1/2/3/4/5:958452}  Past Medical History: {1/2/3/4/5:123447}  Tobacco: She reports that she has never smoked. She has never used smokeless tobacco.  Alcohol: {ALCOHOL CONSUMPTION HX:595759}  {Social and Goals:079758}  Medication Adherence/Access: {fumedadherence:317202}    {MTM SUBJECTIVE (Optional):836264}    Parkinsonism:   Medication Dose Timing    6:30am 11:30am 4:30pm 7:30pm 8-9pm   Carbidopa/levodopa 25/100 0.5 0.5 0.5 0.5     Gabapentin 400mg         1      She has slowly been reducing carbidopa/levodopa over the last 6 months to help reduce dyskinesia/leg shaking when resting in the evening, which is now resolved. She has noticed that putting on shoes or coat is taking longer than it used to. She is  "able to get around ok during the day. She is maybe notice a little shuffling, but not much.   Thinks she is more sluggish in some ways and has trouble moving around and getting comfortable in bed, which sometimes interrupts her sleep.   She is going to bed around 10pm and sometimes has trouble getting to sleep. She wakes a couple times during the night usually to go to the bathroom and does feel stiff during the night. Infrequently notices issues with restless legs. Wakes around 6am and does notice some stiffness, but not impairing.     ***:   ***  -Start taking carbidopa/levodopa 0.5-1 tablet right at bedtime  -After one week, ok to increase morning dose from 0.5 to 1 tablet if needed     Medication Dose Timing    6:30am 11:30am 4:30pm 7:30pm 8-9pm 10pm   Carbidopa/levodopa 25/100 0.5 0.5 0.5 0.5   0.5-1   Gabapentin 400mg         1          Today's Vitals: There were no vitals taken for this visit.  ----------------  {LUARENT?:884064}    I spent {mtm total time 3:865672} with this patient today{MTMpartdbillingquestion:076441}. { :379466}. A copy of the visit note was provided to the patient's provider(s).    A summary of these recommendations {GIVEN/NOT GIVEN:198858}.    ***    Telemedicine Visit Details  Type of service:  {telemedvisitmtm:669628::\"Telephone visit\"}  Start Time: {video/phone visit start time:152948}  End Time: {video/phone visit end time:152948}     Medication Therapy Recommendations  No medication therapy recommendations to display     Medication Therapy Management (MTM) Encounter    ASSESSMENT:                            Medication Adherence/Access: {adherencechoices:014971}    ***:   ***    Parkinsonism:   Dose reductions have been helpful for reducing dyskinesia, but she is noticing a bit more bradykinesia/stiffness during the day and certainly overnight. Discussed adding in a dose right before bedtime that might help with overnight symptoms, ok to start with 0.5 tablet or take a full tablet " "depending on how she is feeling. If helpful, could consider switching ER dose before bedtime as well.  Could consider increasing the morning dose slightly if adding a bedtime dose doesn't make a difference.    PLAN:                            -continue taking 0.5 tablet of carbidopa/levodopa at 19pm    Follow-up: 6 months or sooner if needed    SUBJECTIVE/OBJECTIVE:                          Fabiana Hu is a 63 year old female contacted via secure video for a follow-up visit from 1/5/24.       Reason for visit: med check.    Allergies/ADRs: Reviewed in chart  Past Medical History: Reviewed in chart  Tobacco: She reports that she has never smoked. She has never used smokeless tobacco.  Alcohol: not currently using    Medication Adherence/Access: no issues reported    Parkinsonism:   Medication Dose Timing    6:30am 11:30am 4:30pm 7:30pm 8-9pm 10pm   Carbidopa/levodopa 25/100 0.5 0.5 0.5 0.5   0.5-1   Gabapentin 400mg         1        At last visit, patient elected to add a small dose at 10pm before bedtime to see if helpful for sleep and being able to get around easier when she got up during the night.  Today, patient reported that she has been taking 0.5 tablets at 10pm, though has been unsure it's done much for helping with sleep or feeling less stiff when getting up to urinate during the night. She is getting to sleep ok, but still waking a couple times to urinate. She is trying to drink less with her pills, but still usually a full glass. She has slept better the last couple nights, so unsure if it is just taking a while to kick in.   She has been stiff in the morning, but \"more like just from old age\" that improves once she gets moving.   She also did try a full tablet in the morning on a couple occasions, but was unsure it made much difference and noticed one time that her leg was bouncing later in the morning.  Wondered if dyskinesia.    ----------------  {LAURENT?:733880}    I spent {mtm total time 3:002351} with " "this patient today{MTMpartdbillingquestion:632077}. { :974589}. A copy of the visit note was provided to the patient's provider(s).    A summary of these recommendations {GIVEN/NOT GIVEN:218910}.    ***    Telemedicine Visit Details  Type of service:  {telemedvisitmtm:773217::\"Telephone visit\"}  Start Time: {video/phone visit start time:152948}  End Time: {video/phone visit end time:152948}     Medication Therapy Recommendations  No medication therapy recommendations to display       Again, thank you for allowing me to participate in the care of your patient.      Sincerely,    Yancy Wright, PharmD    "

## 2024-02-09 NOTE — PROGRESS NOTES
Medication Therapy Management (MTM) Encounter    ASSESSMENT:                            Medication Adherence/Access: No issues identified    Parkinsonism/urinary:   Overnight stiffness might be improving a bit. Discussed that she could increase the bedtime dose from 0.5 to 1 tablet if she wanted to, though she plans to continue current dosing for a few more weeks. Discussed that she only needs a few swallows of water to take her pills, as long as she drinking enough through the day. Suggested that she reduce water intake with her pills and drink more water through the morning in effort to limit the full glasses of water at night before bed. Pt agreed.  May consider using carbidopa/levodopa ER at bedtime in the future.      PLAN:                            Continue current medication.    Follow-up: 6 months or sooner if needed    SUBJECTIVE/OBJECTIVE:                          Fabiana Hu is a 63 year old female contacted via secure video for a follow-up visit from 1/5/24.       Reason for visit: med check.    Allergies/ADRs: Reviewed in chart  Past Medical History: Reviewed in chart  Tobacco: She reports that she has never smoked. She has never used smokeless tobacco.  Alcohol: not currently using    Medication Adherence/Access: no issues reported    Parkinsonism/urinary:   Medication Dose Timing    6:30am 11:30am 4:30pm 7:30pm 8-9pm 10pm   Carbidopa/levodopa 25/100 0.5 0.5 0.5 0.5   0.5-1   Gabapentin 400mg         1        At last visit, patient elected to add a small dose at 10pm before bedtime to see if helpful for sleep and being able to get around easier when she got up during the night.  Today, patient reported that she has been taking 0.5 tablets at 10pm, though has been unsure it's done much for helping with sleep or feeling less stiff when getting up to urinate during the night. She is getting to sleep ok, but still waking a couple times to urinate. She is trying to drink less with her pills, but still  "usually a full glass. She has slept better the last couple nights, so unsure if it is just taking a while to kick in.   She has been stiff in the morning, but \"more like just from old age\" that improves once she gets moving.   She also did try a full tablet in the morning on a couple occasions, but was unsure it made much difference and noticed one time that her leg was bouncing later in the morning.  Wondered if dyskinesia.    ----------------      I spent 15 minutes with this patient today. A copy of the visit note was provided to the patient's provider(s).    A summary of these recommendations was sent via clinic portal.    Yancy Wright, PharmD  Medication Therapy Management Pharmacist  [facility]      Telemedicine Visit Details  Type of service:  Video Conference via [employer]  Start Time:  1:00pm  End Time:  1:15pm     Medication Therapy Recommendations  No medication therapy recommendations to display   "

## 2024-02-09 NOTE — PATIENT INSTRUCTIONS
"Recommendations from today's MTM visit:                                                    MTM (medication therapy management) is a service provided by a clinical pharmacist designed to help you get the most of out of your medicines.   Today we reviewed what your medicines are for, how to know if they are working, that your medicines are safe and how to make your medicine regimen as easy as possible.      Continue current medication.    Follow-up: 6 months or sooner if needed    It was great speaking with you today.  I value your experience and would be very thankful for your time in providing feedback in our clinic survey. In the next few days, you may receive an email or text message from [employer] with a link to a survey related to your  clinical pharmacist.\"     To schedule another MTM appointment, please call the clinic directly or you may call the MTM scheduling line at [phone].    My Clinical Pharmacist's contact information:                                                      Please feel free to contact me with any questions or concerns you have.      Yancy Wright, PharmD  Medication Therapy Management Pharmacist  [facility]    "

## 2024-02-22 ENCOUNTER — THERAPY VISIT (OUTPATIENT)
Dept: PHYSICAL THERAPY | Facility: REHABILITATION | Age: 64
End: 2024-02-22
Payer: COMMERCIAL

## 2024-02-22 DIAGNOSIS — R39.9 LOWER URINARY TRACT SYMPTOMS (LUTS): Primary | ICD-10-CM

## 2024-02-22 PROCEDURE — 97112 NEUROMUSCULAR REEDUCATION: CPT | Mod: GP

## 2024-02-22 PROCEDURE — 97110 THERAPEUTIC EXERCISES: CPT | Mod: GP

## 2024-02-22 PROCEDURE — 97535 SELF CARE MNGMENT TRAINING: CPT | Mod: GP

## 2024-03-04 DIAGNOSIS — G20.A1 PARKINSON'S DISEASE, UNSPECIFIED WHETHER DYSKINESIA PRESENT, UNSPECIFIED WHETHER MANIFESTATIONS FLUCTUATE (H): ICD-10-CM

## 2024-03-04 NOTE — TELEPHONE ENCOUNTER
Medication Question or Refill        What medication are you calling about (include dose and sig)?: carbidpoia     Preferred Pharmacy:   [facility] [id number] [place] [address] [address]  Phone: [phone] Fax: [phone]      Controlled Substance Agreement on file:   CSA -- Patient Level:    CSA: None found at the patient level.       Who prescribed the medication?: tuite    Do you need a refill? Yes    When did you use the medication last? One pill left out tomrw     Patient offered an appointment? No    Do you have any questions or concerns?  Yes: urgent please       Could we [facility] this information to you [facility] or would you prefer to receive a phone call?:   Patient would prefer a phone call   Okay to leave a detailed message?: Yes at Home number on file [phone] (home)  
No

## 2024-03-05 RX ORDER — CARBIDOPA AND LEVODOPA 25; 100 MG/1; MG/1
TABLET ORAL
Qty: 270 TABLET | Refills: 3 | Status: SHIPPED | OUTPATIENT
Start: 2024-03-05 | End: 2024-07-19

## 2024-03-07 ENCOUNTER — THERAPY VISIT (OUTPATIENT)
Dept: PHYSICAL THERAPY | Facility: REHABILITATION | Age: 64
End: 2024-03-07
Payer: COMMERCIAL

## 2024-03-07 DIAGNOSIS — R39.9 LOWER URINARY TRACT SYMPTOMS (LUTS): Primary | ICD-10-CM

## 2024-03-07 PROCEDURE — 97750 PHYSICAL PERFORMANCE TEST: CPT | Mod: GP

## 2024-03-07 PROCEDURE — 97110 THERAPEUTIC EXERCISES: CPT | Mod: GP

## 2024-03-08 NOTE — PROGRESS NOTES
[facility]                                                                                   OUTPATIENT PHYSICAL THERAPY    PLAN OF TREATMENT FOR OUTPATIENT REHABILITATION   Patient's Last Name, First Name, Fabiana Painter YOB: 1960   Provider's Name   [facility]   Medical Record No.  [id number]     Onset Date: 11/28/23 (order date)  Start of Care Date: 12/14/23     Medical Diagnosis:  Lower urinary tract symptoms      PT Treatment Diagnosis:  Pelvic floor weakness Plan of Treatment  Frequency/Duration: 1x/week/ 12 weeks    Certification date from 03/07/24 to 06/07/24         See note for plan of treatment details and functional goals     Sandrine Shipley, PT                         I CERTIFY THE NEED FOR THESE SERVICES FURNISHED UNDER        THIS PLAN OF TREATMENT AND WHILE UNDER MY CARE .             Physician Signature               Date    X_____________________________________________________                  Referring Provider:  Ramon Weston    Initial Assessment  See Epic Evaluation- Start of Care Date: 12/14/23 03/07/24 0500   Appointment Info   Signing clinician's name / credentials Sandrine Shipley, PT, DPT   Total/Authorized Visits 12   Visits Used 7   Medical Diagnosis Lower urinary tract symptoms   PT Tx Diagnosis Pelvic floor weakness   Other pertinent information sx consistent with urge incontinence, frequency   Quick Adds Certification   Progress Note/Certification   Start of Care Date 12/14/23   Onset of illness/injury or Date of Surgery 11/28/23  (order date)   Therapy Frequency 1x/week   Predicted Duration 12 weeks   Certification date from 03/07/24   Certification date to 06/07/24   Progress Note Completed Date 12/14/23   GOALS   PT Goals 2;3   PT Goal 1   Goal Identifier HEP   Goal Description Pt will be independent with HEP in order to manage urinary symptoms   Rationale to maximize safety and  "independence within the community   Target Date 12/28/23   PT Goal 2   Goal Identifier Urinary leakage   Goal Description Pt will improve episodes of uncontrolled urinary urge from 8x per day to 4x per day with minimal to no leakage   Rationale to maximize safety and independence within the community;to maximize safety and independence within the home  (so pt may return to walking)   Target Date 03/05/24   PT Goal 3   Goal Identifier endurance   Goal Description Pt's endurance will improve from five PF contractions of 5 second holds to 10 PF contractions of 10s holds in order to improve strength and decrease leakage   Rationale to maximize safety and independence with performance of ADLs and functional tasks   Target Date 05/07/24   Subjective Report   Subjective Report Pt reports that she tries to stick to a schedule during the day although always JIC'ing before going out. The urge frequency and incontinence is still there sometimes when she's walking and \"just has no control even if I don't have urge\". Has been able to control urge a little better particularly in her own enivronment.   Objective Measures   Objective Measures Objective Measure 1   Objective Measure 1   Objective Measure Difficulty maintaining PF contraction with breathing/coughing   Treatment Interventions (PT)   Interventions Therapeutic Procedure/Exercise;Neuromuscular Re-education;Self Care/Home Management   Therapeutic Procedure/Exercise   Therapeutic Procedures: strength, endurance, ROM, flexibility minutes (89428) 25   Therapeutic Procedures Ther Proc 2;Ther Proc 3   Ther Proc 2 - Details Diaphragmatic breathing x2 min   Ther Proc 3 Supine pelvic floor strengthening -- w/ cueing x10 in supine and seated   Ther Proc 3 - Details Pelvic floor contraction + TA contraction combined in seated x10 -- pt having difficulty with this   PTRx Ther Proc 1 Wall Sit   PTRx Ther Proc 1 - Details added today: PERFORM KEGEL AND HOLD THROUGH ENTIRE MOVEMENT: " "stand upright with back and buttocks touching wall place feet 12\" apart and about 6\" from wall. slowly lower your hips by bending the knees and slide down the wall until the knees are flexed about 45 degrees. pause 5 seconds then slowly slide back up. RELAX KEGEL THEN REPEAT. 3 sets of 10-15 repetitions.   PTRx Ther Proc 2 Pilates - Hip Circles Modified   PTRx Ther Proc 2 - Details Progressed to this today: 20 seconds switch directions after 10 seconds remember to keep core engaged and breathe. repeat x3. Take a 15 second break between each set.   PTRx Ther Proc 3 Pretzel Stretch   PTRx Ther Proc 3 - Details not today -- pt performs at home   PTRx Ther Proc 4 Supine Butterfly   PTRx Ther Proc 4 - Details not today -- pt performs at home   PTRx Ther Proc 5 Iliotibial Band Stretch Supine   PTRx Ther Proc 5 - Details not today -- pt performs at home   PTRx Ther Proc 6 Hamstring Stretch Long Sitting   PTRx Ther Proc 6 - Details not today -- pt performs at home   Patient Response/Progress Demonstrating appropriately   Therapeutic Activity   Ther Act 1 (not today) Reviewed coordination system of core inc diaphragm/TA/multifidi/PF mm, reviewed urgency suppression techniques inc voiding once peak urge has passed; provided pt with new bladder diary to fill out. Encouraged pt to maintain better posture for core stability and full diaphragmatic excursion. Discussed potentially creating a more structured voiding schedule. Discussed uses of a pessary and showed pt product for potential self purchase along with demonstration video of how it is inserted. Talked to pt about incorporating her  into her exercises for audio feedback much like [employer] program. Showed pt slip-on sneakers for self-purchase should she choose.   Ther Act 1 - Details Reviewed pt's completed bladder diary and created voiding schedule for pt to follow; removing 1 void during the day and increasing time intervals by 10-15min between voids   PTRx Ther " Act 1 Diaphragmatic Breathing   PTRx Ther Act 1 - Details HEP   Neuromuscular Re-education   Neuromuscular re-ed of mvmt, balance, coord, kinesthetic sense, posture, proprioception minutes (15078) 8   Neuro Re-ed 1 Briefly discussed pt's new voiding schedule, pt forgot to bring today but was able to adhere to it some days. Delaying voids by 5-10 minutes now.   Neuro Re-ed 1 - Details Posture correction with towel roll for seated PF exercises, feet supported   PTRx Neuro Re-ed 1 Pelvic Floor Muscle Strengthening Quick Flicks   PTRx Neuro Re-ed 1 - Details In seated or standing: 15 total of the kegels (no holds) (can do 5 repetitions at a time 3x per day) . 15 total of the 2-3 second holds in a day (same thing - can do 5 reps 3x per day). so 30 in total of all of these. can inhale as you fully relax exhale as you fully contract.   Patient Response/Progress Time spent cueing for appropriate PF contraction; demonstrated to pt how to self assess in seated   Self Care/home Management   ADL/Home Mgmt Training (59970) 2   Self Care 1 Reviewed voiding schedule, created new one taking out 2 voiding intervals and increasing time between voids accordingly.   PTRx Self Care 1 Education Sheet General   PTRx Self Care 1 - Details Discussed HEP and expectations for HEP: Strength: 4-5 days per week and choose 3-4 strength exercises. Stretching as desired particularly before/after strength exercises.    Self Care 1 - Details Urge Incontinence Suppression Techniques -- reviewed with education to perform 3 quick flicks with urge   Patient Response/Progress Pt understanding   Eval/Assessments   Assessments Physical Performance Test/Measures   Physical Performance Test/measures   Physical Performance Test/Measurement, Minutes (54549) 12   Physical Performance Test/Measurement Details Pt shows improved ability to hold kegel with cough (although requires cues to do so), improved PF strength 3+/5 and 10 quick flicks in 14 seconds. Pt's  coordination has improved and is no longer nilsa glutes with kegel contraction. Cueing today for contraction with exhale, relaxation with inhale; some difficulty fully relaxing between each contraction.   Education   Learner/Method Patient;Demonstration;Listening;Pictures/Video   Plan   Home program PTRX   Plan for next session Review new stretches, discuss voiding schedule again, biofeedback (ABN), progress core if able   Comments   Comments Pt returns for 7th appt for urge incontinence. Pt has made some progress with urge suppression however continues to need to void before leaving the house and often has trouble holding urge while walking -- sometimes pt has leakage even when there is not much of an urge present. Upon assessment today, pt has shown improved PF strength (3/5 to 3+/5), although still requires cueing for PF coordination. Today's session focused on review of exercises and progression of core strength. Pt will continue to benefit from skilled PT intervention including education, strengthening, biofeedback and symptom management.   Total Session Time   Timed Code Treatment Minutes 47   Total Treatment Time (sum of timed and untimed services) 47       Agree with plan  Ramon Weston MD  March 8, 2024

## 2024-03-21 ENCOUNTER — THERAPY VISIT (OUTPATIENT)
Dept: PHYSICAL THERAPY | Facility: REHABILITATION | Age: 64
End: 2024-03-21
Payer: COMMERCIAL

## 2024-03-21 DIAGNOSIS — R39.9 LOWER URINARY TRACT SYMPTOMS (LUTS): Primary | ICD-10-CM

## 2024-03-21 PROCEDURE — 97535 SELF CARE MNGMENT TRAINING: CPT | Mod: GP

## 2024-03-21 PROCEDURE — 97110 THERAPEUTIC EXERCISES: CPT | Mod: GP

## 2024-04-02 ASSESSMENT — MOVEMENT DISORDERS SOCIETY - UNIFIED PARKINSONS DISEASE RATING SCALE (MDS-UPDRS)
TOTAL_SCORE: 0
FREEZING: (0) NORMAL: NOT AT ALL (NO PROBLEMS).
WALKING_AND_BALANCE: (1) SLIGHT: I AM SLIGHTLY SLOW OR MAY DRAG A LEG.  I NEVER USE A WALKING AID.
SALIVA_AND_DROOLING: (1) SLIGHT: I HAVE TOO MUCH SALIVA, BUT DO NOT DROOL.
HANDWRITING: (0) NORMAL: NOT AT ALL (NO PROBLEMS).
EATING_TASKS: (1) SLIGHT: I AM SLOW, BUT I DO NOT NEED ANY HELP HANDLING MY FOOD AND HAVE NOT HAD FOOD SPILLS WHILE EATING.
DRESSING: (2) MILD: I AM SLOW AND NEED HELP FOR A FEW DRESSING TASKS (BUTTONS, BRACELETS).
TURNING_IN_BED: (1) SLIGHT: I HAVE A BIT OF TROUBLE TURNING, BUT I DO NOT NEED ANY HELP.
HYGIENE: (0) NORMAL: NOT AT ALL (NO PROBLEMS).
GETTING_OUT_OF_BED_CAR_DEEP_CHAIR: (1) SLIGHT: I AM SLOW OR AWKWARD, BUT I USUALLY CAN DO IT ON MY FIRST TRY.
CHEWING_AND_SWALLOWING: (0) NORMAL: NO PROBLEMS.
TREMOR: (0) NORMAL: NOT AT ALL (NO PROBLEMS).
HOBBIES_AND_OTHER_ACTIVITIES: (0) NORMAL: NOT AT ALL (NO PROBLEMS).
SPEECH: (0) NORMAL: NOT AT ALL (NO PROBLEMS).

## 2024-04-04 ENCOUNTER — THERAPY VISIT (OUTPATIENT)
Dept: PHYSICAL THERAPY | Facility: REHABILITATION | Age: 64
End: 2024-04-04
Payer: COMMERCIAL

## 2024-04-04 DIAGNOSIS — R39.9 LOWER URINARY TRACT SYMPTOMS (LUTS): Primary | ICD-10-CM

## 2024-04-04 PROCEDURE — 97110 THERAPEUTIC EXERCISES: CPT | Mod: GP

## 2024-04-04 PROCEDURE — 97112 NEUROMUSCULAR REEDUCATION: CPT | Mod: GP

## 2024-04-09 ENCOUNTER — OFFICE VISIT (OUTPATIENT)
Dept: NEUROLOGY | Facility: CLINIC | Age: 64
End: 2024-04-09
Payer: COMMERCIAL

## 2024-04-09 VITALS
OXYGEN SATURATION: 100 % | DIASTOLIC BLOOD PRESSURE: 64 MMHG | SYSTOLIC BLOOD PRESSURE: 128 MMHG | HEART RATE: 64 BPM | RESPIRATION RATE: 16 BRPM

## 2024-04-09 DIAGNOSIS — G20.A1 PARKINSON'S DISEASE WITHOUT DYSKINESIA OR FLUCTUATING MANIFESTATIONS (H): Primary | ICD-10-CM

## 2024-04-09 PROCEDURE — G2211 COMPLEX E/M VISIT ADD ON: HCPCS | Performed by: NURSE PRACTITIONER

## 2024-04-09 PROCEDURE — 99215 OFFICE O/P EST HI 40 MIN: CPT | Performed by: NURSE PRACTITIONER

## 2024-04-09 ASSESSMENT — UNIFIED PARKINSONS DISEASE RATING SCALE (UPDRS)
AMPLITUDE_RLE: (0) NORMAL: NO TREMOR.
TOETAPPING_LEFT: (1) SLIGHT: ANY OF THE FOLLOWING: A) THE REGULAR RHYTHM IS BROKEN WITH ONE WITH ONE OR TWO INTERRUPTIONS OR HESITATIONS OF THE MOVEMENT  B) SLIGHT SLOWING  C) THE AMPLITUDE DECREMENTS NEAR THE END OF THE 10 MOVEMENTS.
LEG_AGILITY_RIGHT: (0) NORMAL: NO PROBLEMS.
TOTAL_SCORE: 6
AMPLITUDE_LUE: (0) NORMAL: NO TREMOR.
FINGER_TAPPING_LEFT: (2) MILD: ANY OF THE FOLLOWING: A) 3 TO 5 INTERRUPTIONS DURING TAPPING  B) MILD SLOWING  C) THE AMPLITUDE DECREMENTS MIDWAY IN THE 10-MOVEMENT SEQUENCE.
PRONATION_SUPINATION_LEFT: (3) MODERATE: ANY OF THE FOLLOWING: A) MORE THAN 5 INTERRUPTIONS OR AT LEAST ONE LONGER ARREST (FREEZE) IN ONGOING MOVEMENT  B) MODERATE SLOWING  C) THE AMPLITUDE DECREMENTS STARTING AFTER THE FIRST MOVEMENT.
FACIAL_EXPRESSION: (3) MASKED FACIES WITH LIPS PARTED SOME OF THE TIME WHEN THE MOUTH IS AT REST.
RIGIDITY_LUE: (2) MILD: RIGIDITY DETECTED WITHOUT THE ACTIVATION MANEUVER. FULL RANGE OF MOTION IS EASILY ACHIEVED.
AMPLITUDE_RUE: (0) NORMAL: NO TREMOR.
FREEZING_GAIT: (0) NORMAL: NO FREEZING.
HANDMOVEMENTS_RIGHT: (0) NORMAL: NO PROBLEMS.
MOVEMENTS_INTERFERE_WITH_RATINGS: NO
HANDMOVEMENTS_LEFT: (2) MILD: ANY OF THE FOLLOWING: A) 3 TO 5 INTERRUPTIONS DURING TAPPING  B) MILD SLOWING  C) THE AMPLITUDE DECREMENTS MIDWAY IN THE 10-MOVEMENT SEQUENCE.
TOETAPPING_RIGHT: (1) SLIGHT: ANY OF THE FOLLOWING: A) THE REGULAR RHYTHM IS BROKEN WITH ONE WITH ONE OR TWO INTERRUPTIONS OR HESITATIONS OF THE MOVEMENT  B) SLIGHT SLOWING  C) THE AMPLITUDE DECREMENTS NEAR THE END OF THE 10 MOVEMENTS.
PARKINSONS_MEDS: ON
RIGIDITY_LLE: (2) MILD: RIGIDITY DETECTED WITHOUT THE ACTIVATION MANEUVER. FULL RANGE OF MOTION IS EASILY ACHIEVED.
RIGIDITY_NECK: (2) MILD: RIGIDITY DETECTED WITHOUT THE ACTIVATION MANEUVER. FULL RANGE OF MOTION IS EASILY ACHIEVED.
POSTURE: (1) SLIGHT: NOT QUITE ERECT, BUT COULD BE NORMAL FOR OLDER PERSONS.
DYSKINESIAS_PRESENT: NO
SPEECH: (1) SLIGHT: LOSS OF MODULATION, DICTION OR VOLUME, BUT STILL ALL WORDS EASY TO UNDERSTAND.
RIGIDITY_RUE: (1) SLIGHT: RIGIDITY ONLY DETECTED WITH ACTIVATION MANEUVER.
AMPLITUDE_LIP_JAW: (0) NORMAL: NO TREMOR.
TOTAL_SCORE_LEFT: 13
AMPLITUDE_LLE: (0) NORMAL: NO TREMOR.
RIGIDITY_RLE: (1) SLIGHT: RIGIDITY ONLY DETECTED WITH ACTIVATION MANEUVER.
PRONATION_SUPINATION_RIGHT: (2) MILD: ANY OF THE FOLLOWING: A) 3 TO 5 INTERRUPTIONS DURING TAPPING  B) MILD SLOWING  C) THE AMPLITUDE DECREMENTS MIDWAY IN THE 10-MOVEMENT SEQUENCE.
SPONTANEITY_OF_MOVEMENT: (1) SLIGHT: SLIGHT GLOBAL SLOWNESS AND POVERTY OF SPONTANEOUS MOVEMENTS.
LEG_AGILITY_LEFT: (0) NORMAL: NO PROBLEMS.
ARISING_CHAIR: (0) NORMAL: NO PROBLEMS. ABLE TO ARISE QUICKLY WITHOUT HESITATION.
GAIT: (1) SLIGHT: INDEPENDENT WALKING WITH MINOR GAIT IMPAIRMENT.
FINGER_TAPPING_RIGHT: (0) NORMAL: NO PROBLEMS.

## 2024-04-09 ASSESSMENT — PAIN SCALES - GENERAL: PAINLEVEL: NO PAIN (0)

## 2024-04-09 NOTE — PATIENT INSTRUCTIONS
Dear Ms. Fabiana Hu,    Thank you for coming today.  During your visit, we have discussed the following:     __  Stay on the same antiparkinsonian medications.     __  Continue with your exercising regimen.     __  Ask Dr. Wright, PharmD about the bladder medication.     PD Medications 7:30 am 11:30 am 4:30 pm 7:30 pm 10/10:30 pm   Sinemet 25/100 mg  1 1/2 1/2 1/2 1   Gabapentin 400 mg     1      __ Return in 6 months to see Dr. Weston. You may return sooner as needed.       For questions, you may send us a [email or web address] message or call [phone]    Fax number: [phone]    To make an appointment with one of our pharmacists, (Dr. Mahajan, Pharm.D. or Dr. Wright, PharmD), call [phone].    Radha Keene, PAUL, APRN  [facility]

## 2024-04-09 NOTE — LETTER
2024       RE: Fabiana Hu   [address]  [facility] [address]     Dear Colleague,    Thank you for referring your patient, Fabiana Hu, to the [facility] NEUROLOG[facility]IN[facility]. Please see a copy of my visit note below.      ASSESSMENT:    Parkinson's Disease:  Dyskinesias have improved with Sinemet dose reduction.  For moring & nighttime stiffness, the 1st and last doses have been increased from 1/2 tab to 1 tab.  This is going well.       PLAN:    __  All questions answered and the following patient instructions provided: -     __  Stay on the same antiparkinsonian medications.     __  Continue with your exercising regimen.     __  Ask Dr. Wright, PharmD about the bladder medication.     PD Medications 7:30 am 11:30 am 4:30 pm 7:30 pm 10/10:30 pm   Sinemet 25/100 mg  1 1/2 1/2 1/2 1   Gabapentin 400 mg     1      __ Return in 6 months to see Dr. Weston. You may return sooner as needed.             MOVEMENT DISORDERS CLINIC           PATIENT: Fabiana Hu    : 1960    DEN:  2024    REASON FOR VISIT: Parkinson's disease (PD) follow up.    HPI: Ms. Fabiana Hu is a 64  year old who came to the [facility] neurology clinic accompanied by her , Brandt, for a follow up visit.     She was last seen in the clinic on 2023 by Dr. Weston. I saw her last on 2023.     Pertinent PD Hx: She was diagnosed with PD in Aug 2022. She didn't recall her exact symptoms that made her see a neurologist. Pt and her  said that she had stiffness, shuffling gait, difficulty turning in bed, and some tremors in Lt hand. Pt didn't recall how bad her symptoms were. She was stared on Sinemet at her initial visit at [facility]. She is experiencing dyskinesias on Sinemet.     Pt has been working with Dr. Wright, PharmD to slowly reduce Sinemet to minimize dyskinesias.  She was taking 25/100 mg 1/2 tab 5 x a day.  To improve  nighttime mobility, her HS dose was increased to 1 tab.  Recently, pt has increased the 1st dose (7:30 am dose) to 1 tablet to improve morning stiffness.      PD Medications 7:30 am 11:30 am 4:30 pm 7:30 pm 10/10:30 pm   Sinemet 25/100 mg  1 1/2 1/2 1/2 1   Gabapentin 400 mg     1      She reports that her left side feels weak. She also has left shoulder pain & will be doing PT.     Pt brought questions: -    When to talk Sinemet. Currently she is taking it 1 hr before meals with 8 oz of water. A nutritionist said to take it 30 min before and with 4 oz of water.    Mirabegron is not working. She asked if  gemtesa (vibegron) would help.     Pt and her  will be heading to [place] and [place] - 3 weeks.  She asked how to take her medications.      Pt asked about the level of exercise she needs to get the neuro protective benefit.      MEDICATIONS:   Current Outpatient Medications   Medication Sig Dispense Refill    buPROPion (WELLBUTRIN XL) 150 MG 24 hr tablet Take 150 mg by mouth every morning      carbidopa-levodopa (SINEMET)  MG tablet 1/2 tab @ 7:30am, 11:30am, 4:30pm and 7:30pm and 1 tab as needed 270 tablet 3    gabapentin (NEURONTIN) 400 MG capsule Take 400 mg by mouth at bedtime @8pm      levothyroxine (SYNTHROID/LEVOTHROID) 75 MCG tablet Take 1 tablet by mouth daily      lithium (ESKALITH) 300 MG tablet Take 150 mg by mouth at bedtime      mirabegron (MYRBETRIQ) 50 MG 24 hr tablet Take 1 tablet (50 mg) by mouth daily (Patient not taking: Reported on 4/9/2024) 30 tablet 11     Current Facility-Administered Medications   Medication Dose Route Frequency Provider Last Rate Last Admin    botulinum toxin type B (MYOBLOC) Solution 2,500 Units  2,500 Units Intramuscular See Admin Instructions Jammie West MD   2,000 Units at 01/23/24 7091       PHYSICAL EXAM:    VITAL SIGNS:  Blood pressure 128/64, pulse 64, resp. rate 16, SpO2 100%.     GENERAL:  Ms. Hu is a pleasant  female who is  well-groomed and well-developed sitting comfortably in the exam room without any distress.  Affect is appropriate.    MOVEMENT DISORDERS ASSESSMENT:  MDS UPDRS III (Last Sinemet was about 11:30 am)      4/9/2024    12:00 PM   UPDRS Motor Scale   Time: 12:27   Medication On   R Brain DBS: None   L Brain DBS: None   Dyskinesia (LID) No   Did LID interfere No   Speech 1   Facial Expression 3   Rigidity Neck 2   Rigidity RUE 1   Rigidity LUE 2   Rigidity RLE 1   Rigidity LLE 2   Finger Taps R 0   Finger Taps L 2   Hand Mvt R 0   Hand Mvt L 2   Pron-/Supinate R 2   Pron-/Supinate L 3   Toe Tap R 1   Toe Tap L 1   Leg Agility R 0   Leg Agility L 0   Arise From Chair 0   Gait 1   Gait Freezing 0   Posture 1   Global Spont Mvt 1   Postural Tremor RUE 1   Postural Tremor LUE 1   Kinetic Tremor RUE 0   Kinetic Tremor LUE 0   Rest Tremor RUE 0   Rest Tremor LUE 0   Rest Tremor RLE 0   Rest Tremor LLE 0   Rest Tremor Lip/Jaw 0   Total Right 6   Total Left 13       Today I spent 45 minutes caring for the patient. Time was spent with reviewing records, meeting with the patient, answering questions, examining,  and documentation.      The longitudinal plan of care for the diagnosis(es)/condition(s) as documented were addressed during this visit. Due to the added complexity in care, I will continue to support Fabiana in the subsequent management and with ongoing continuity of care.        Again, thank you for allowing me to participate in the care of your patient.      Sincerely,    SHLOMO Mederos CNP

## 2024-04-09 NOTE — NURSING NOTE
Chief Complaint   Patient presents with    RECHECK     /64 (BP Location: Right arm, Patient Position: Sitting, Cuff Size: Adult Regular)   Pulse 64   Resp 16   SpO2 100%     MARKTERRENCE QUIROGA

## 2024-04-09 NOTE — PROGRESS NOTES
ASSESSMENT:    Parkinson's Disease:  Dyskinesias have improved with Sinemet dose reduction.  For moring & nighttime stiffness, the 1st and last doses have been increased from 1/2 tab to 1 tab.  This is going well.       PLAN:    __  All questions answered and the following patient instructions provided: -     __  Stay on the same antiparkinsonian medications.     __  Continue with your exercising regimen.     __  Ask Dr. Wright, JacquiD about the bladder medication.     PD Medications 7:30 am 11:30 am 4:30 pm 7:30 pm 10/10:30 pm   Sinemet 25/100 mg  1 1/2 1/ 1   Gabapentin 400 mg     1      __ Return in 6 months to see Dr. Weston. You may return sooner as needed.             MOVEMENT DISORDERS CLINIC           PATIENT: Fabiana Hu    : 1960    DEN:  2024    REASON FOR VISIT: Parkinson's disease (PD) follow up.    HPI: Ms. Fabiana Hu is a 64  year old who came to the [facility] neurology clinic accompanied by her , Brandt, for a follow up visit.     She was last seen in the clinic on 2023 by Dr. Weston. I saw her last on 2023.     Pertinent PD Hx: She was diagnosed with PD in Aug 2022. She didn't recall her exact symptoms that made her see a neurologist. Pt and her  said that she had stiffness, shuffling gait, difficulty turning in bed, and some tremors in Lt hand. Pt didn't recall how bad her symptoms were. She was stared on Sinemet at her initial visit at [facility]. She is experiencing dyskinesias on Sinemet.     Pt has been working with Dr. Wright, PharmD to slowly reduce Sinemet to minimize dyskinesias.  She was taking 25/100 mg 1/2 tab 5 x a day.  To improve nighttime mobility, her HS dose was increased to 1 tab.  Recently, pt has increased the 1st dose (7:30 am dose) to 1 tablet to improve morning stiffness.      PD Medications 7:30 am 11:30 am 4:30 pm 7:30 pm 10/10:30 pm   Sinemet 25/100 mg  1 1/2 1/2 /2 1   Gabapentin 400 mg     1      She reports that her left  side feels weak. She also has left shoulder pain & will be doing PT.     Pt brought questions: -    When to talk Sinemet. Currently she is taking it 1 hr before meals with 8 oz of water. A nutritionist said to take it 30 min before and with 4 oz of water.    Mirabegron is not working. She asked if  gemtesa (vibegron) would help.     Pt and her  will be heading to [place] and [place] - 3 weeks.  She asked how to take her medications.      Pt asked about the level of exercise she needs to get the neuro protective benefit.      MEDICATIONS:   Current Outpatient Medications   Medication Sig Dispense Refill    buPROPion (WELLBUTRIN XL) 150 MG 24 hr tablet Take 150 mg by mouth every morning      carbidopa-levodopa (SINEMET)  MG tablet 1/2 tab @ 7:30am, 11:30am, 4:30pm and 7:30pm and 1 tab as needed 270 tablet 3    gabapentin (NEURONTIN) 400 MG capsule Take 400 mg by mouth at bedtime @8pm      levothyroxine (SYNTHROID/LEVOTHROID) 75 MCG tablet Take 1 tablet by mouth daily      lithium (ESKALITH) 300 MG tablet Take 150 mg by mouth at bedtime      mirabegron (MYRBETRIQ) 50 MG 24 hr tablet Take 1 tablet (50 mg) by mouth daily (Patient not taking: Reported on 4/9/2024) 30 tablet 11     Current Facility-Administered Medications   Medication Dose Route Frequency Provider Last Rate Last Admin    botulinum toxin type B (MYOBLOC) Solution 2,500 Units  2,500 Units Intramuscular See Admin Instructions Jammie West MD   2,000 Units at 01/23/24 1558       PHYSICAL EXAM:    VITAL SIGNS:  Blood pressure 128/64, pulse 64, resp. rate 16, SpO2 100%.     GENERAL:  Ms. Hu is a pleasant  female who is well-groomed and well-developed sitting comfortably in the exam room without any distress.  Affect is appropriate.    MOVEMENT DISORDERS ASSESSMENT:  MDS UPDRS III (Last Sinemet was about 11:30 am)      4/9/2024    12:00 PM   UPDRS Motor Scale   Time: 12:27   Medication On   R Brain DBS: None   L Brain DBS: None    Dyskinesia (LID) No   Did LID interfere No   Speech 1   Facial Expression 3   Rigidity Neck 2   Rigidity RUE 1   Rigidity LUE 2   Rigidity RLE 1   Rigidity LLE 2   Finger Taps R 0   Finger Taps L 2   Hand Mvt R 0   Hand Mvt L 2   Pron-/Supinate R 2   Pron-/Supinate L 3   Toe Tap R 1   Toe Tap L 1   Leg Agility R 0   Leg Agility L 0   Arise From Chair 0   Gait 1   Gait Freezing 0   Posture 1   Global Spont Mvt 1   Postural Tremor RUE 1   Postural Tremor LUE 1   Kinetic Tremor RUE 0   Kinetic Tremor LUE 0   Rest Tremor RUE 0   Rest Tremor LUE 0   Rest Tremor RLE 0   Rest Tremor LLE 0   Rest Tremor Lip/Jaw 0   Total Right 6   Total Left 13       Today I spent 45 minutes caring for the patient. Time was spent with reviewing records, meeting with the patient, answering questions, examining,  and documentation.    Radha Keene, PAUL, APRN  [facility]    The longitudinal plan of care for the diagnosis(es)/condition(s) as documented were addressed during this visit. Due to the added complexity in care, I will continue to support Fabiana in the subsequent management and with ongoing continuity of care.

## 2024-04-30 ENCOUNTER — OFFICE VISIT (OUTPATIENT)
Dept: NEUROLOGY | Facility: CLINIC | Age: 64
End: 2024-04-30
Payer: COMMERCIAL

## 2024-04-30 DIAGNOSIS — K11.7 SIALORRHEA: Primary | ICD-10-CM

## 2024-04-30 PROCEDURE — 64611 CHEMODENERV SALIV GLANDS: CPT | Performed by: STUDENT IN AN ORGANIZED HEALTH CARE EDUCATION/TRAINING PROGRAM

## 2024-04-30 NOTE — PROGRESS NOTES
Movement Disorders Botulinum Toxin Clinic Note    History of Present Illness:  Fabiana Hu is a 63 year old female who presents to clinic for botulinum toxin injections for treatment of sialorrhea secondary to Parkinson's disease.     Date of last injection: 24    Onset of effect after injections:  uncertain, few weeks    Response from last injections:  best during the middle month, reduced drooling    Wearing off: Started during the third month    Side effects: She reports no side effects.        Physical Examination:  Vital Signs:   vitals were not taken for this visit.       BOTULINUM NEUROTOXIN INJECTION PROCEDURES:    VERIFICATION OF PATIENT IDENTIFICATION AND PROCEDURE     Initials   Patient Name ACC   Patient  ACC   Procedure Verified by: ACC     Prior to the start of the procedure and with procedural staff participation, I verbally confirmed the patient s identity using two indicators, relevant allergies, that the procedure was appropriate and matched the consent or emergent situation, and that the correct equipment/implants were available. Immediately prior to starting the procedure I conducted the Time Out with the procedural staff and re-confirmed the patient s name, procedure, and site/side. (The Joint Commission universal protocol was followed.)  Yes    Sedation (Moderate or Deep): None      Above assessments performed by:  Fellow: Elis Garcia MD    Provider: Jammie West MD    INDICATION/S FOR PROCEDURE/S:  Fabiana Hu is a 63 year old year old patient with sialorrhea secondary to Parkinson's disease with associated symptoms of bothersome drooling on clothes, face .     Her baseline symptoms have been recalcitrant to oral medications and conservative therapy.  She is here today for an injection of Myobloc.      GOAL OF PROCEDURE:  The goal of this procedure is to decrease excessive drooling  associated with sialorrhea.    TOTAL DOSE ADMINISTERED:  Dose Administered: 2500 units Myobloc     Diluent Used:  Preservative Free Normal Saline  Total Volume of Diluent Used:  0.5 mL    CONSENT:  The risks, benefits, and treatment options were discussed with Fabiana Hu and she agreed to proceed.      Written consent was obtained by [facility].     EQUIPMENT USED:  Needle-30 gauge    SKIN PREPARATION:  Skin preparation was performed using an alcohol wipe.    GUIDANCE DESCRIPTION:  Guidance was not utilized for this procedure     AREA/MUSCLE INJECTED:    Muscles Injected Units Injected Number of Injections   Right Parotid 1250 (1000) 1   Left Parotid 1250 (1000) 1             Total Units Injected: 2500    Unavoidable Waste: 0    Total Units Billed 2500    ( ) = previous dose    The patient tolerated the injections without difficulty.      Assessment:    Fabiana Hu is a 63 year old female with sialorrhea secondary to Parkinson's disease.  Today we did repeat botulinum toxin injections.    Plan  Follow-up in 3 and 6 months to consider repeat injections  5000 units ordered for next appointment    Jammie West MD   of Neurology  Movement Disorders Division

## 2024-04-30 NOTE — LETTER
2024       RE: Fabiana Hu   [address]  [facility] [address]     Dear Colleague,    Thank you for referring your patient, Fabiana Hu, to the [facility] NEUROLOGY CLINIC [place] at [facility]. Please see a copy of my visit note below.    Movement Disorders Botulinum Toxin Clinic Note    History of Present Illness:  Fabiana Hu is a 63 year old female who presents to clinic for botulinum toxin injections for treatment of sialorrhea secondary to Parkinson's disease.     Date of last injection: 24    Onset of effect after injections:  uncertain, few weeks    Response from last injections:  best during the middle month, reduced drooling    Wearing off: Started during the third month    Side effects: She reports no side effects.        Physical Examination:  Vital Signs:   vitals were not taken for this visit.       BOTULINUM NEUROTOXIN INJECTION PROCEDURES:    VERIFICATION OF PATIENT IDENTIFICATION AND PROCEDURE     Initials   Patient Name ACC   Patient  ACC   Procedure Verified by: ACC     Prior to the start of the procedure and with procedural staff participation, I verbally confirmed the patient s identity using two indicators, relevant allergies, that the procedure was appropriate and matched the consent or emergent situation, and that the correct equipment/implants were available. Immediately prior to starting the procedure I conducted the Time Out with the procedural staff and re-confirmed the patient s name, procedure, and site/side. (The Joint Commission universal protocol was followed.)  Yes    Sedation (Moderate or Deep): None      Above assessments performed by:  Fellow: Elis Garcia MD    Provider: Jammie West MD    INDICATION/S FOR PROCEDURE/S:  Fabiana Hu is a 63 year old year old patient with sialorrhea secondary to Parkinson's disease with associated symptoms of bothersome drooling on clothes, face .     Her baseline  symptoms have been recalcitrant to oral medications and conservative therapy.  She is here today for an injection of Myobloc.      GOAL OF PROCEDURE:  The goal of this procedure is to decrease excessive drooling  associated with sialorrhea.    TOTAL DOSE ADMINISTERED:  Dose Administered: 2500 units Myobloc    Diluent Used:  Preservative Free Normal Saline  Total Volume of Diluent Used:  0.5 mL    CONSENT:  The risks, benefits, and treatment options were discussed with Fabiana Hu and she agreed to proceed.      Written consent was obtained by ACC.     EQUIPMENT USED:  Needle-30 gauge    SKIN PREPARATION:  Skin preparation was performed using an alcohol wipe.    GUIDANCE DESCRIPTION:  Guidance was not utilized for this procedure     AREA/MUSCLE INJECTED:    Muscles Injected Units Injected Number of Injections   Right Parotid 1250 (1000) 1   Left Parotid 1250 (1000) 1             Total Units Injected: 2500    Unavoidable Waste: 0    Total Units Billed 2500    ( ) = previous dose    The patient tolerated the injections without difficulty.      Assessment:    Fabiana Hu is a 63 year old female with sialorrhea secondary to Parkinson's disease.  Today we did repeat botulinum toxin injections.    Plan  Follow-up in 3 and 6 months to consider repeat injections  5000 units ordered for next appointment          Again, thank you for allowing me to participate in the care of your patient.      Sincerely,    Jammie West MD

## 2024-05-06 ENCOUNTER — THERAPY VISIT (OUTPATIENT)
Dept: PHYSICAL THERAPY | Facility: REHABILITATION | Age: 64
End: 2024-05-06
Payer: COMMERCIAL

## 2024-05-06 DIAGNOSIS — R39.9 LOWER URINARY TRACT SYMPTOMS (LUTS): Primary | ICD-10-CM

## 2024-05-06 PROCEDURE — 97110 THERAPEUTIC EXERCISES: CPT | Mod: GP

## 2024-05-06 PROCEDURE — 97535 SELF CARE MNGMENT TRAINING: CPT | Mod: GP

## 2024-05-12 ENCOUNTER — HEALTH MAINTENANCE LETTER (OUTPATIENT)
Age: 64
End: 2024-05-12

## 2024-06-06 DIAGNOSIS — R39.9 LOWER URINARY TRACT SYMPTOMS: ICD-10-CM

## 2024-06-06 RX ORDER — VIBEGRON 75 MG/1
75 TABLET, FILM COATED ORAL DAILY
Qty: 30 TABLET | Refills: 1 | Status: SHIPPED | OUTPATIENT
Start: 2024-06-06 | End: 2024-08-23

## 2024-06-06 NOTE — TELEPHONE ENCOUNTER
RX Authorization    Medication: vibegron (GEMTESA) 75 MG TABS tablet     Date last refill ordered: 4/12/24    Quantity ordered: 30    # refills: 1    Date of last clinic visit with ordering provider: 11/28/23    Date of next clinic visit with ordering provider: 10/10/24

## 2024-06-07 ENCOUNTER — TRANSFERRED RECORDS (OUTPATIENT)
Dept: HEALTH INFORMATION MANAGEMENT | Facility: CLINIC | Age: 64
End: 2024-06-07
Payer: MEDICARE

## 2024-06-07 ENCOUNTER — MEDICAL CORRESPONDENCE (OUTPATIENT)
Dept: HEALTH INFORMATION MANAGEMENT | Facility: CLINIC | Age: 64
End: 2024-06-07
Payer: MEDICARE

## 2024-06-10 ENCOUNTER — TRANSCRIBE ORDERS (OUTPATIENT)
Dept: OTHER | Age: 64
End: 2024-06-10

## 2024-06-10 DIAGNOSIS — R32 URINARY INCONTINENCE: ICD-10-CM

## 2024-06-10 DIAGNOSIS — N81.89 PELVIC FLOOR WEAKNESS: Primary | ICD-10-CM

## 2024-06-14 ENCOUNTER — VIRTUAL VISIT (OUTPATIENT)
Dept: NEUROLOGY | Facility: CLINIC | Age: 64
End: 2024-06-14
Attending: PSYCHIATRY & NEUROLOGY
Payer: MEDICARE

## 2024-06-14 DIAGNOSIS — G20.A1 PARKINSON'S DISEASE, UNSPECIFIED WHETHER DYSKINESIA PRESENT, UNSPECIFIED WHETHER MANIFESTATIONS FLUCTUATE (H): ICD-10-CM

## 2024-06-14 DIAGNOSIS — N32.81 OVERACTIVE BLADDER: Primary | ICD-10-CM

## 2024-06-14 NOTE — PATIENT INSTRUCTIONS
"Recommendations from today's MTM visit:                                                         -ok increase carbidopa/levodopa from 0.5 to 1 tablet at a time. Only increase one dose per day for a few days at a time to make sure you tolerate it ok. Ok to go up to 1 tablet four times daily if that is feeling good.    -talk with GYN about checking urine if you are still feeling poorly    Follow-up: 7/19/24    It was great speaking with you today.  I value your experience and would be very thankful for your time in providing feedback in our clinic survey. In the next few days, you may receive an email or text message from [employer] with a link to a survey related to your  clinical pharmacist.\"     To schedule another MTM appointment, please call the clinic directly or you may call the MTM scheduling line at [phone].    My Clinical Pharmacist's contact information:                                                      Please feel free to contact me with any questions or concerns you have.      Yancy Wright, PharmD  Medication Therapy Management Pharmacist  [facility]      "

## 2024-06-14 NOTE — Clinical Note
Hello, I meet with this patient to review and adjust her medications through Neurology. She has parkinson's disease.. You see her on 6/18. She has recently returned from traveling in [place] and has been very weak.  Wondered about potential UTI, but she will see how she feels after the weekend. Let me know if you have questions.

## 2024-06-14 NOTE — Clinical Note
Sorry, I did not sign my message. I am Yancy Wright, pharmacist in Neurology.  Yancy Wright, PharmD Medication Therapy Management Pharmacist [facility]

## 2024-06-14 NOTE — Clinical Note
6/14/2024       RE: Fabiana Hu  [address]  [facility] [address]     Dear Colleague,    Thank you for referring your patient, Fabiana Hu, to the [facility] MULTIPLE SCLEROSIS CLINIC [place] at [facility]. Please see a copy of my visit note below.    Medication Therapy Management (MTM) Encounter    ASSESSMENT:                            Medication Adherence/Access: {adherencechoices:325022}    ***:   ***    Parkinsonism/urinary:   Overnight stiffness might be improving a bit. Discussed that she could increase the bedtime dose from 0.5 to 1 tablet if she wanted to, though she plans to continue current dosing for a few more weeks. Discussed that she only needs a few swallows of water to take her pills, as long as she drinking enough through the day. Suggested that she reduce water intake with her pills and drink more water through the morning in effort to limit the full glasses of water at night before bed. Pt agreed.  May consider using carbidopa/levodopa ER at bedtime in the future.       PLAN:                            ***    Follow-up: {followuptest2:340034}    SUBJECTIVE/OBJECTIVE:                          Fabiana Hu is a 64 year old female seen for {mtmvisit:454491}     Reason for visit: ***.    Allergies/ADRs: {1/2/3/4/5:320714}  Past Medical History: {1/2/3/4/5:077355}  Tobacco: She reports that she has never smoked. She has never used smokeless tobacco.  Alcohol: {ALCOHOL CONSUMPTION HX:649506}  {Social and Goals:579553}  Medication Adherence/Access: {fumedadherence:409405}    {MTM SUBJECTIVE (Optional):073110}      Parkinsonism/urinary:   Medication Dose Timing    6:30am 11:30am 4:30pm 7:30pm 8-9pm 10pm   Carbidopa/levodopa 25/100 0.5 0.5 0.5 0.5   0.5-1   Gabapentin 400mg         1        At last visit, patient elected to add a small dose at 10pm before bedtime to see if helpful for sleep and being able to get around easier when she got up  "during the night.  Today, patient reported that she has been taking 0.5 tablets at 10pm, though has been unsure it's done much for helping with sleep or feeling less stiff when getting up to urinate during the night. She is getting to sleep ok, but still waking a couple times to urinate. She is trying to drink less with her pills, but still usually a full glass. She has slept better the last couple nights, so unsure if it is just taking a while to kick in.   She has been stiff in the morning, but \"more like just from old age\" that improves once she gets moving.   She also did try a full tablet in the morning on a couple occasions, but was unsure it made much difference and noticed one time that her leg was bouncing later in the morning.  Wondered if dyskinesia.     Overactive bladder:   ***    About two months ago, switched from Myrbetriq to Gemtesa.    Today's Vitals: There were no vitals taken for this visit.  ----------------  {LAURENT?:907269}    I spent {mtm total time 3:419114} with this patient today{MTMpartdbillingquestion:907854}. { :908302}. A copy of the visit note was provided to the patient's provider(s).    A summary of these recommendations {GIVEN/NOT GIVEN:056186}.    ***    Telemedicine Visit Details  Type of service:  {telemedvisitmtm:586123::\"Telephone visit\"}  Start Time: {video/phone visit start time:152948}  End Time: {video/phone visit end time:152948}     Medication Therapy Recommendations  No medication therapy recommendations to display     Medication Therapy Management (MTM) Encounter    ASSESSMENT:                            Medication Adherence/Access: No issues identified    Parkinsonism/urinary:   Overnight continues to improve since increasing the bedtime dose. No clear sign of illness that could be contributing to weakness, though could consider UTI or COVID with recent travel. She does have GYN appointment next week and may ask about having urine checked.   Discussed that she could try " increasing the carbidopa/levodopa dose slightly, as previous increases had caused dyskinesia. May seem some improvement in energy/fatigue if related.      Overactive Bladder:    ***         PLAN:                            -check UA on Tuesday ?  -ok increase carbidopa/levodopa from 0.5 to 1 tablet    Follow-up: 7/19/24    SUBJECTIVE/OBJECTIVE:                          Fabiana Hu is a 64 year old female seen for a follow-up visit.       Reason for visit: med check.    Allergies/ADRs: Reviewed in chart  Past Medical History: Reviewed in chart  Tobacco: She reports that she has never smoked. She has never used smokeless tobacco.  Alcohol: not currently using    Medication Adherence/Access: no issues reported    Parkinsonism:   Medication Dose Timing    6:30am 11:30am 4:30pm 7:30pm 8-9pm 10pm   Carbidopa/levodopa 25/100 0.5 0.5 0.5 0.5   1   Gabapentin 400mg         1        She did increase the 10pm dose from 0.5 to 1 tablet since last visit and feels that she is able to sleep better. She is now only waking up about twice nightly instead of four times and getting more restful sleep.  Since she has been back from vacation two weeks ago from [place] and [place], she is feeling more weak, loss of strength. Gets exhausted very quickly. She is still doing exercise class, but has to rest a lot.  They were busy and walking a lot, but hasn't felt improved over the last 2 weeks. She doesn't feel ill. Denied any respiratory symptoms or other changes. She hasn't noticed clear pattern if she feels better after carbidopa/levodopa doses.     While they were away, she thought she was going to run out of pills and bought some at a pharmacy in [place]. States that they were oblong and much easier to split. Didn't notice a difference in efficacy.     Overactive bladder:   -Gemtesa 75mg    About two months ago, switched from Myrbetriq to Gemtesa. She hasn't noticed much difference since changing medication. Still having issues with  incontinence. She was doing PT which was mildly helpful. Had seen Urology years ago, but felt that they were out of options. She has an appointment next week with GYN.    ----------------      I spent 30 minutes with this patient today. { :397618}. A copy of the visit note was provided to the patient's provider(s).    A summary of these recommendations was sent via clinic portal.    Yancy Wright PharmD  Medication Therapy Management Pharmacist  [facility]      Telemedicine Visit Details  Type of service:  Telephone visit  Start Time:  2:00pm  End Time:  2:30pm     Medication Therapy Recommendations  No medication therapy recommendations to display       Again, thank you for allowing me to participate in the care of your patient.      Sincerely,    Yancy Wright PharmD

## 2024-06-14 NOTE — PROGRESS NOTES
Medication Therapy Management (MTM) Encounter    ASSESSMENT:                            Medication Adherence/Access: No issues identified    Parkinsonism:   Overnight continues to improve since increasing the bedtime dose. No clear sign of illness that could be contributing to weakness, though could consider UTI or COVID with recent travel. She does have GYN appointment next week and may ask about having urine checked.   Discussed that she could try increasing the carbidopa/levodopa dose slightly, as previous increases had caused dyskinesia. May see some improvement in energy/fatigue if related.    Overactive Bladder:    Follow up with GYN next week. Consider UA if still feeling weak and exhausted.       PLAN:                            -ok increase carbidopa/levodopa from 0.5 to 1 tablet at a time. Only increase one dose per day for a few days at a time to make sure you tolerate it ok. Ok to go up to 1 tablet four times daily if that is feeling good.    -talk with GYN about checking urine if you are still feeling poorly    Follow-up: 7/19/24    SUBJECTIVE/OBJECTIVE:                          Fabiana Hu is a 64 year old female seen for a follow-up visit.       Reason for visit: med check.    Allergies/ADRs: Reviewed in chart  Past Medical History: Reviewed in chart  Tobacco: She reports that she has never smoked. She has never used smokeless tobacco.  Alcohol: not currently using    Medication Adherence/Access: no issues reported    Parkinsonism:   Medication Dose Timing    6:30am 11:30am 4:30pm 7:30pm 8-9pm 10pm   Carbidopa/levodopa 25/100 0.5 0.5 0.5 0.5   1   Gabapentin 400mg         1        She did increase the 10pm dose from 0.5 to 1 tablet since last visit and feels that she is able to sleep better. She is now only waking up about twice nightly instead of four times and getting more restful sleep.  Since she has been back from vacation two weeks ago from [place] and [place], she is feeling more weak, loss of  strength. Gets exhausted very quickly. She is still doing exercise class, but has to rest a lot.  They were busy and walking a lot, but hasn't felt improved over the last 2 weeks. She doesn't feel ill. Denied any respiratory symptoms or other changes. She hasn't noticed clear pattern if she feels better after carbidopa/levodopa doses.     While they were away, she thought she was going to run out of pills and bought some at a pharmacy in [place]. States that they were oblong and much easier to split. Didn't notice a difference in efficacy.     Overactive bladder:   -Gemtesa 75mg daily    About two months ago, switched from Myrbetriq to Gemtesa. She hasn't noticed much difference since changing medication. Still having issues with incontinence. She was doing PT which was mildly helpful. Had seen Urology years ago, but felt that they were out of options. She has an appointment next week with GYN.    ----------------      I spent 30 minutes with this patient today. All changes were made via collaborative practice agreement with Ramon Weston. A copy of the visit note was provided to the patient's provider(s).    A summary of these recommendations was sent via clinic portal.    Yancy Wright, [place]  Medication Therapy Management Pharmacist  [facility]      Telemedicine Visit Details  Type of service:  Telephone visit  Start Time:  2:00pm  End Time:  2:30pm     Medication Therapy Recommendations  Parkinson's disease (H)    Current Medication: carbidopa-levodopa (SINEMET)  MG tablet   Rationale: Dose too low - Dosage too low - Effectiveness   Recommendation: Increase Dose - increase by one half tablet every few days as tolerated   Status: Accepted per CPA          Current Medication: carbidopa-levodopa (SINEMET)  MG tablet (Discontinued)   Rationale: Dose too low - Dosage too low - Effectiveness   Recommendation: Increase Frequency - start taking 0.5-1 tab at bedtime  and may increase morning dose   Status: Accepted per CPA

## 2024-06-14 NOTE — Clinical Note
Was doing ok before recent travel to [place] and [place], then has been very weak and exhausted that has not improved since returning 2 weeks ago. Discussed checking for COVID, UTI. She will try an extra half carbidopa/levodopa this afternoon to see if any difference. Other thoughts?

## 2024-06-18 ENCOUNTER — OFFICE VISIT (OUTPATIENT)
Dept: OBGYN | Facility: CLINIC | Age: 64
End: 2024-06-18
Payer: MEDICARE

## 2024-06-18 VITALS
WEIGHT: 131.5 LBS | BODY MASS INDEX: 25.68 KG/M2 | OXYGEN SATURATION: 97 % | HEART RATE: 70 BPM | SYSTOLIC BLOOD PRESSURE: 129 MMHG | DIASTOLIC BLOOD PRESSURE: 80 MMHG

## 2024-06-18 DIAGNOSIS — R30.0 DYSURIA: Primary | ICD-10-CM

## 2024-06-18 DIAGNOSIS — N39.41 URGE INCONTINENCE OF URINE: ICD-10-CM

## 2024-06-18 LAB
ALBUMIN UR-MCNC: NEGATIVE MG/DL
APPEARANCE UR: CLEAR
BACTERIA #/AREA URNS HPF: ABNORMAL /HPF
BILIRUB UR QL STRIP: NEGATIVE
COLOR UR AUTO: YELLOW
GLUCOSE UR STRIP-MCNC: NEGATIVE MG/DL
HGB UR QL STRIP: ABNORMAL
KETONES UR STRIP-MCNC: NEGATIVE MG/DL
LEUKOCYTE ESTERASE UR QL STRIP: NEGATIVE
NITRATE UR QL: NEGATIVE
PH UR STRIP: 5.5 [PH] (ref 5–7)
RBC #/AREA URNS AUTO: ABNORMAL /HPF
SP GR UR STRIP: <=1.005 (ref 1–1.03)
SQUAMOUS #/AREA URNS AUTO: ABNORMAL /LPF
UROBILINOGEN UR STRIP-ACNC: 0.2 E.U./DL
WBC #/AREA URNS AUTO: ABNORMAL /HPF

## 2024-06-18 PROCEDURE — 81001 URINALYSIS AUTO W/SCOPE: CPT

## 2024-06-18 PROCEDURE — 87086 URINE CULTURE/COLONY COUNT: CPT

## 2024-06-18 PROCEDURE — 99204 OFFICE O/P NEW MOD 45 MIN: CPT

## 2024-06-18 ASSESSMENT — PATIENT HEALTH QUESTIONNAIRE - PHQ9: SUM OF ALL RESPONSES TO PHQ QUESTIONS 1-9: 2

## 2024-06-18 NOTE — PROGRESS NOTES
CC: pelvic exam, skin check, urge incontinence, dysuria  S: Fabiana is a 63 yo  postmenopausal female here today for a pelvic exam, skin check, urge incontinence and dysuria  -recently traveled to [place] and [place]- has felt more weak, fatigued, and run down, uncertain if it exacerbated parkinson vs jet lag, ambar like to be evaluated for UTI  -Fabiana denies recurrent UTI  -had recent skin eval at [facility], had 4 moles removed and had concerns for precancerous lesions, was recc to have pelvic skin check as well  -does not have any concerns or diagnosed genital skin concerns  -pap utd 2022 NIL- reports no hx abn pap smear  -no bleeding  -no discharge, itching, burning of genitals  -has diagnosis of urge incontinence, saw a urologist, completed pelvic floor pt, takes gemesa 74mg not feeling much improvement up to void 3-4 times overnight    O: /80   Pulse 70   Wt 59.6 kg (131 lb 8 oz)   SpO2 97%   BMI 25.68 kg/m    Past Medical History:   Diagnosis Date    Urinary incontinence 2023     Past Surgical History:   Procedure Laterality Date    breast surgery      central duct excision right breast     SECTION      x2    EYE SURGERY Bilateral     cataract surgery    ORAL SURGERY IP CONSULT Left     oral mucosal lesion left     Current Outpatient Medications   Medication Sig Dispense Refill    buPROPion (WELLBUTRIN XL) 150 MG 24 hr tablet Take 150 mg by mouth every morning      CALCIUM-VITAMIN D-VITAMIN K PO Take 1 tablet by mouth daily      carbidopa-levodopa (SINEMET)  MG tablet 1/2 tab @ 7:30am, 11:30am, 4:30pm and 7:30pm and 1 tab as needed 270 tablet 3    gabapentin (NEURONTIN) 400 MG capsule Take 400 mg by mouth at bedtime @8pm      GEMTESA 75 MG TABS tablet TAKE 1 TABLET BY MOUTH EVERY DAY 30 tablet 1    levothyroxine (SYNTHROID/LEVOTHROID) 75 MCG tablet Take 1 tablet by mouth daily      lithium (ESKALITH) 300 MG tablet Take 150 mg by mouth at bedtime       Current  Facility-Administered Medications   Medication Dose Route Frequency Provider Last Rate Last Admin    botulinum toxin type B (MYOBLOC) injection 5,000 Units  5,000 Units Intramuscular See Admin Instructions         botulinum toxin type B (MYOBLOC) Solution 2,500 Units  2,500 Units Intramuscular See Admin Instructions Jammie West MD   2,500 Units at 24 1646     Pelvic Exam:  Vulva: No external lesions, normal hair distribution, no adenopathy  Vagina: Moist, pale no abnormal discharge, smooth, no lesions  Cervix:  parous, smooth, pink, no visible lesions  Uterus: Normal size, anteverted, non-tender, mobile  Ovaries: No mass, non-tender, mobile  Rectal exam: skin appearance normal, no lesions, minor contact dermatitis on buttox       2023     8:46 AM 2024    10:12 AM   PHQ   PHQ-9 Total Score 0 2   Q9: Thoughts of better off dead/self-harm past 2 weeks Not at all Not at all       A/P:   Fabiana is a 65 yo  postmenopausal female here today for a pelvic exam, skin check, urge incontinence and dysuria  -skin normal, no lesions, breakdown, or loss of pigmentation  1. Dysuria  - UA with Microscopic - lab collect; Future  - Urine Culture Aerobic Bacterial - lab collect; Future  - UA with Microscopic - lab collect  - Urine Culture Aerobic Bacterial - lab collect  - Urine Microscopic Exam    2. Urge incontinence of urine  -discussed taking meds, completed pelvic pt, nothing has been particularly helpful for urge incontinence sx  -discussed if recurrent uti or stress incontinence topical vaginal estrogen could be a helpful tool  -discussed limiting constant use of pad, chose unscented and undyed pads, give skin chance to air out, avoid tight fitting clothes, cotton underwear, some minor contact dermatitis on buttocks likely from continuous pad use   - Adult Uro/Gyn  Referral; Future    Rtc PRN with gyn concerns continue care with PCP- establish with uro/gyn for urge incontinence  Beth  SHLOMO Felix CNP

## 2024-06-18 NOTE — Clinical Note
Records from [facility] indicate that patient had a mammogram done on 03/08/2023 (next due 03/08/2024), results were negative; colonoscopy done on 10/20/2023, results found a polyp (next due on 10/20/2026); Dexa scan done on 04/10/2023, results were normal; pap smear was done on 05/03/2022, results were NILM and HPV negative; and Hepatitis C was done 09/13/2019, results were negative.

## 2024-06-19 LAB — BACTERIA UR CULT: NORMAL

## 2024-07-19 ENCOUNTER — VIRTUAL VISIT (OUTPATIENT)
Dept: NEUROLOGY | Facility: CLINIC | Age: 64
End: 2024-07-19
Attending: PSYCHIATRY & NEUROLOGY
Payer: MEDICARE

## 2024-07-19 DIAGNOSIS — N32.81 OVERACTIVE BLADDER: Primary | ICD-10-CM

## 2024-07-19 DIAGNOSIS — G20.A1 PARKINSON'S DISEASE, UNSPECIFIED WHETHER DYSKINESIA PRESENT, UNSPECIFIED WHETHER MANIFESTATIONS FLUCTUATE (H): ICD-10-CM

## 2024-07-19 RX ORDER — CARBIDOPA AND LEVODOPA 25; 100 MG/1; MG/1
TABLET ORAL
Qty: 270 TABLET | Refills: 3 | Status: SHIPPED | OUTPATIENT
Start: 2024-07-19

## 2024-07-19 NOTE — Clinical Note
7/19/2024       RE: Fabiana Hu  [address]  [facility] [address]     Dear Colleague,    Thank you for referring your patient, Fabiana Hu, to the [facility] [place] at [facility]. Please see a copy of my visit note below.    Medication Therapy Management (MTM) Encounter    ASSESSMENT:                            Medication Adherence/Access: {adherencechoices:332179}    ***:   ***    PLAN:                            ***    Follow-up: {followuptest2:650661}    SUBJECTIVE/OBJECTIVE:                          Fabiana Hu is a 64 year old female seen for {mtmvisit:551410}     Reason for visit: ***.    Allergies/ADRs: {1/2/3/4/5:225235}  Past Medical History: {1/2/3/4/5:599274}  Tobacco: She reports that she has never smoked. She has never used smokeless tobacco.  Alcohol: {ALCOHOL CONSUMPTION HX:951932}  {Social and Goals:103733}  Medication Adherence/Access: {fumedadherence:717336}    {MTM SUBJECTIVE (Optional):266856}    -ok increase carbidopa/levodopa from 0.5 to 1 tablet at a time. Only increase one dose per day for a few days at a time to make sure you tolerate it ok. Ok to go up to 1 tablet four times daily if that is feeling good.     Parkinsonism:   Medication Dose Timing    6:30am 11:30am 4:30pm 7:30pm 8-9pm 10pm   Carbidopa/levodopa 25/100 0.5 0.5 0.5 0.5   1   Gabapentin 400mg         1        She did increase the 10pm dose from 0.5 to 1 tablet since last visit and feels that she is able to sleep better. She is now only waking up about twice nightly instead of four times and getting more restful sleep.  Since she has been back from vacation two weeks ago from [place] and [place], she is feeling more weak, loss of strength. Gets exhausted very quickly. She is still doing exercise class, but has to rest a lot.  They were busy and walking a lot, but hasn't felt improved over the last 2 weeks. She doesn't feel ill. Denied any  "respiratory symptoms or other changes. She hasn't noticed clear pattern if she feels better after carbidopa/levodopa doses.      While they were away, she thought she was going to run out of pills and bought some at a pharmacy in [place]. States that they were oblong and much easier to split. Didn't notice a difference in efficacy.  ***:   ***    Today's Vitals: There were no vitals taken for this visit.  ----------------  {LAURENT?:688398}    I spent {mtm total time 3:614422} with this patient today{MTMpartdbillingquestion:690101}. { :834778}. A copy of the visit note was provided to the patient's provider(s).    A summary of these recommendations {GIVEN/NOT GIVEN:875632}.    ***    Telemedicine Visit Details  Type of service:  {telemedvisitmtm:997843::\"Telephone visit\"}  Start Time: {video/phone visit start time:152948}  End Time: {video/phone visit end time:152948}     Medication Therapy Recommendations  No medication therapy recommendations to display     Medication Therapy Management (MTM) Encounter    ASSESSMENT:                            Medication Adherence/Access: {adherencechoices:381718}    Parkinsonism:   Discussed going back clare    Urinary incontinence:   -Gemtesa 75mg daily    PLAN:                            ***    Follow-up: {followuptest2:941881}    SUBJECTIVE/OBJECTIVE:                          Fabiana Hu is a 64 year old female seen for a follow-up visit.       Reason for visit: med check.    Allergies/ADRs: Reviewed in chart  Past Medical History: Reviewed in chart  Tobacco: She reports that she has never smoked. She has never used smokeless tobacco.  Alcohol: not currently using    Medication Adherence/Access: no issues reported      Parkinsonism:   Medication Dose Timing    6:30am 11:30am 4:30pm 7:30pm 8-9pm 10pm   Carbidopa/levodopa 25/100 0.5 0.5 0.5 0.5   1   Gabapentin 400mg         1        At last visit, discussed ok to increase doses slowly from 0.5 to 1 tablet once daily for a week at a time " in effort to address increased weakness that had persisted since returning from international trip in [place].    She did increase the 11:30am dose from 0.5 to 1 tablet after that visit one month ago and does notice more dyskinesia about half hour after that dose that persists for a couple hours. In just the last week, she has been feeling stronger, less weak and wonders if related to the carbidopa/levodopa increase or just took a while to get better after her trip.  She has continued taking the full tablet at bedtime, which has been helpful for more consolidated sleep.    She has been taking smaller bites and eating crackers with her medicines, which seems to have helped reduced belching.    Urinary incontinence:   -Gemtesa 75mg daily    Symptoms have been a bit better just recently, but has been taking Gemtesa for 3 months now.  Sees urology in about 6 weeks. Wonders if it just got better for other reasons or if it takes that long for med to work.    ----------------      I spent 20 minutes with this patient today. All changes were made via collaborative practice agreement with Ramon Weston. A copy of the visit note was provided to the patient's provider(s).    A summary of these recommendations was sent via clinic portal.    Yancy Wright PharmD  Medication Therapy Management Pharmacist  [facility]    Telemedicine Visit Details  Type of service:  Telephone visit  Start Time:  2:00pm  End Time:  2:20pm     Medication Therapy Recommendations  No medication therapy recommendations to display       Again, thank you for allowing me to participate in the care of your patient.      Sincerely,    Yancy Wright PharmD

## 2024-07-19 NOTE — PROGRESS NOTES
Medication Therapy Management (MTM) Encounter    ASSESSMENT:                            Medication Adherence/Access: No issues identified    Parkinsonism:   Discussed going back down on the 11:30am carbidopa/levodopa dose from 1 to 0.5 tablets to see if there is any change in weakness. May have just taken a while to readjust after lots of travel.    Urinary incontinence:   As she's been really unsure that there has been any improvement related to the medication, discussed that she could try off of it after using up current supply to assess whether there are any changes. She does see Urology in 6 weeks.    PLAN:                            -Reduce 11:30am carbidopa/levodopa from 1 tablet to 0.5 tablets.  -Ok to try without Gemtesa    Follow-up: 3-6 months or sooner if needed    SUBJECTIVE/OBJECTIVE:                          Fabiana Hu is a 64 year old female seen for a follow-up visit.       Reason for visit: med check.    Allergies/ADRs: Reviewed in chart  Past Medical History: Reviewed in chart  Tobacco: She reports that she has never smoked. She has never used smokeless tobacco.  Alcohol: not currently using    Medication Adherence/Access: no issues reported      Parkinsonism:   Medication Dose Timing    6:30am 11:30am 4:30pm 7:30pm 8-9pm 10pm   Carbidopa/levodopa 25/100 0.5 0.5 0.5 0.5   1   Gabapentin 400mg         1        At last visit, discussed ok to increase doses slowly from 0.5 to 1 tablet once daily for a week at a time in effort to address increased weakness that had persisted since returning from international trip in May/June.    She did increase the 11:30am dose from 0.5 to 1 tablet after that visit one month ago and does notice more dyskinesia about half hour after that dose that persists for a couple hours. In just the last week, she has been feeling stronger, less weak and wonders if related to the carbidopa/levodopa increase or just took a while to get better after her trip.  She has continued  taking the full tablet at bedtime, which has been helpful for more consolidated sleep.    She has been taking smaller bites and eating crackers with her medicines, which seems to have helped reduced belching.    Urinary incontinence:   -Gemtesa 75mg daily    Symptoms have been a bit better just recently, but has been taking Gemtesa for 3 months now.  Sees urology in about 6 weeks. Wonders if it just got better for other reasons or if it takes that long for med to work.    ----------------      I spent 20 minutes with this patient today. All changes were made via collaborative practice agreement with Ramon Weston. A copy of the visit note was provided to the patient's provider(s).    A summary of these recommendations was sent via clinic portal.    Jacqui SultanaD  Medication Therapy Management Pharmacist  [facility]    Telemedicine Visit Details  Type of service:  Telephone visit  Start Time:  2:00pm  End Time:  2:20pm     Medication Therapy Recommendations  Parkinson's disease (H)    Current Medication: carbidopa-levodopa (SINEMET)  MG tablet   Rationale: Dose too high - Dosage too high - Safety   Recommendation: Decrease Dose - reduce 11:30am from 1 to 0.5 tabs   Status: Accepted per CPA         Urinary incontinence    Current Medication: GEMTESA 75 MG TABS tablet   Rationale: Patient prefers not to take - Adherence - Adherence   Recommendation: Discontinue Medication - ok to try without if you'd like   Status: Accepted per CPA

## 2024-07-19 NOTE — PATIENT INSTRUCTIONS
"Recommendations from today's MTM visit:                                                         -Reduce 11:30am carbidopa/levodopa from 1 tablet to 0.5 tablets.  -Ok to try without Gemtesa    Follow-up: 3-6 months or sooner if needed    It was great speaking with you today.  I value your experience and would be very thankful for your time in providing feedback in our clinic survey. In the next few days, you may receive an email or text message from [employer] with a link to a survey related to your  clinical pharmacist.\"     To schedule another MTM appointment, please call the clinic directly or you may call the MTM scheduling line at [phone].    My Clinical Pharmacist's contact information:                                                      Please feel free to contact me with any questions or concerns you have.      Yancy Wright, PharmD  Medication Therapy Management Pharmacist  [facility]    "

## 2024-08-07 ENCOUNTER — PRE VISIT (OUTPATIENT)
Dept: UROLOGY | Facility: CLINIC | Age: 64
End: 2024-08-07
Payer: MEDICARE

## 2024-08-07 NOTE — TELEPHONE ENCOUNTER
Reason for visit: Urge incontinence      Relevant information: Takes gemtesa 74mg which has not helped. Has completed pelvic floor therapy. History of urge incontinence and overactive bladder. Previously seen at [facility].    Records/imaging/labs/orders: all records available    Pt called: No need for a call    At Rooming: PVR and urine sample if UTI symptoms. Do not ask patient to undress for exam.     Dulce Valencia  8/7/2024  11:37 AM

## 2024-08-23 DIAGNOSIS — R39.9 LOWER URINARY TRACT SYMPTOMS: ICD-10-CM

## 2024-08-23 RX ORDER — VIBEGRON 75 MG/1
75 TABLET, FILM COATED ORAL DAILY
Qty: 30 TABLET | Refills: 1 | Status: SHIPPED | OUTPATIENT
Start: 2024-08-23 | End: 2024-09-29

## 2024-08-23 NOTE — TELEPHONE ENCOUNTER
RX Authorization    Medication: GEMTESA 75 MG TABS tablet     Date last refill ordered: 6/6/24    Quantity ordered: 30    # refills: 1    Date of last clinic visit with ordering provider: 4/9/24 (Radha)    Date of next clinic visit with ordering provider: 10/10/24

## 2024-09-03 NOTE — PROGRESS NOTES
DISCHARGE  Reason for Discharge: No further expectation of progress.  Patient chooses to discontinue therapy.  Pt was eager to participate in PT and adherent to HEP through the course of 10 sessions. Pt did see some overall improvement in urgency however was not seeing significant progress.     Equipment Issued: n/a    Discharge Plan: Patient to continue home program.    Referring Provider:  Ramon Weston       05/06/24 0500   Appointment Info   Signing clinician's name / credentials Sandrine Shipley, PT, DPT   Total/Authorized Visits 12   Visits Used 10   Medical Diagnosis Lower urinary tract symptoms   PT Tx Diagnosis Pelvic floor weakness   Other pertinent information sx consistent with urge incontinence, frequency   Quick Adds Certification   Progress Note/Certification   Start of Care Date 12/14/23   Onset of illness/injury or Date of Surgery 11/28/23  (order date)   Therapy Frequency 1x/week   Predicted Duration 12 weeks   Certification date from 03/07/24   Certification date to 06/07/24   Progress Note Completed Date 12/14/23   GOALS   PT Goals 2;3   PT Goal 1   Goal Identifier HEP   Goal Description Pt will be independent with HEP in order to manage urinary symptoms   Rationale to maximize safety and independence within the community   Target Date 12/28/23   PT Goal 2   Goal Identifier Urinary leakage   Goal Description Pt will improve episodes of uncontrolled urinary urge from 8x per day to 4x per day with minimal to no leakage   Rationale to maximize safety and independence within the community;to maximize safety and independence within the home  (so pt may return to walking)   Target Date 03/05/24   PT Goal 3   Goal Identifier endurance   Goal Description Pt's endurance will improve from five PF contractions of 5 second holds to 10 PF contractions of 10s holds in order to improve strength and decrease leakage   Rationale to maximize safety and independence with performance of ADLs and functional  "tasks   Target Date 05/07/24   Subjective Report   Subjective Report Pt reports she doesn't see any difference with Gemtesa. Will give it another month. Sometimes thinks maybe she has had some improvement other times is not sure. Leaks 2x per day on average and goes to bathroom 8-10x per day. Gets up 3-4x per night now to urinate. Takes half carbidopa before bed with just a tiny amount of water around 10:30. Stopped melatonin for sleep because it didn't seem to help. 3-4 pads used per day. Just leakage with urge. Reports that she can at times distract herself and wait 30 min before going after initial urge, however it is very hit or miss with no rhyme or reason. Has to go to the bathroom immediately after 8oz water. Is uncertain if this is \"just something I'll have to live with because of my Parkinson's.\" Is grateful but ready to d/c and will continue HEP at home.   Objective Measures   Objective Measures Objective Measure 1   Objective Measure 1   Objective Measure Difficulty maintaining PF contraction with breathing/coughing   Treatment Interventions (PT)   Interventions Therapeutic Procedure/Exercise;Neuromuscular Re-education;Self Care/Home Management   Therapeutic Procedure/Exercise   Therapeutic Procedures: strength, endurance, ROM, flexibility minutes (64984) 25   Therapeutic Procedures Ther Proc 2;Ther Proc 3   Ther Proc 1 Showed her how to squat, 8# weight, lots of cueing with wall behind and mirror   Ther Proc 1 - Details Long-assisted stretching (piriformis pretzel, HS, gastroc, QL, hip rolls, knee to chest)   Ther Proc 2 - Details QL stretch, HS stretch, gastroc stretch   PTRx Ther Proc 1 Wall Sit   PTRx Ther Proc 1 - Details reviewed: PERFORM KEGEL AND HOLD THROUGH ENTIRE MOVEMENT: stand upright with back and buttocks touching wall place feet 12\" apart and about 6\" from wall. slowly lower your hips by bending the knees and slide down the wall until the knees are flexed about 45 degrees. pause 5 seconds " "then slowly slide back up. RELAX KEGEL THEN REPEAT. 3 sets of 10-15 repetitions.   PTRx Ther Proc 2 Pilates - Stomach Series: Modified Elbow to Knee   PTRx Ther Proc 2 - Details \"T[place] twist\" instead, x30   PTRx Ther Proc 3 Pilates - Hip Opener Right Hip   PTRx Ther Proc 3 - Details No Notes   PTRx Ther Proc 4 Supine Butterfly   PTRx Ther Proc 4 - Details with PT assist   PTRx Ther Proc 5 Supine Lumbar Hip Roll   PTRx Ther Proc 5 - Details No Notes   PTRx Ther Proc 6 Iliotibial Band Stretch Supine   PTRx Ther Proc 6 - Details with PT assist   PTRx Ther Proc 7 Bridge with Ball Squeezes   PTRx Ther Proc 7 - Details x8x2, 5s holds   Patient Response/Progress Demonstrating appropriately   Therapeutic Activity   Ther Act 1 (not today) Reviewed coordination system of core inc diaphragm/TA/multifidi/PF mm, reviewed urgency suppression techniques inc voiding once peak urge has passed; provided pt with new bladder diary to fill out. Encouraged pt to maintain better posture for core stability and full diaphragmatic excursion. Discussed potentially creating a more structured voiding schedule. Discussed uses of a pessary and showed pt product for potential self purchase along with demonstration video of how it is inserted. Talked to pt about incorporating her  into her exercises for audio feedback much like [employer] program. Showed pt slip-on sneakers for self-purchase should she choose.   Ther Act 1 - Details Reviewed pt's completed bladder diary and created voiding schedule for pt to follow; removing 1 void during the day and increasing time intervals by 10-15min between voids   PTRx Ther Act 1 Diaphragmatic Breathing   PTRx Ther Act 1 - Details HEP   Neuromuscular Re-education   Neuromuscular re-ed of mvmt, balance, coord, kinesthetic sense, posture, proprioception minutes (82466) 10   Neuro Re-ed 1 Spent much time cueing for appropriate squat technique, utilizing mirror, dowel, and wall for feedback. Used 8# " dumbbell.   Neuro Re-ed 1 - Details Educated on head-hip strategy and engaging abdominals for biomechanics   PTRx Neuro Re-ed 1 Pelvic Floor Muscle Strengthening Quick Flicks   PTRx Neuro Re-ed 1 - Details In seated or standing: 15 total of the kegels (no holds) (can do 5 repetitions at a time 3x per day) . 15 total of the 2-3 second holds in a day (same thing - can do 5 reps 3x per day). so 30 in total of all of these. can inhale as you fully relax exhale as you fully contract.   Patient Response/Progress Time spent cueing for appropriate PF contraction; demonstrated to pt how to self assess in seated   Self Care/home Management   ADL/Home Mgmt Training (96304) 15   Self Care 1 Discussed bedtime routine, drink slightly larger amount of water but then do not go to bathroom until the last minute in order to completely empty bladder before bed   Self Care 1 - Details Discussed nervous system downtraining and several methods of relaxation including meditation   PTRx Self Care 1 Education Sheet General   PTRx Self Care 1 - Details No Notes   Skilled Intervention For self management of sx   Patient Response/Progress Pt understanding   Eval/Assessments   Assessments Physical Performance Test/Measures   Physical Performance Test/measures   Physical Performance Test/Measurement Details Pt shows improved ability to hold kegel with cough (although requires cues to do so), improved PF strength 3+/5 and 10 quick flicks in 14 seconds. Pt's coordination has improved and is no longer nilsa glutes with kegel contraction. Cueing today for contraction with exhale, relaxation with inhale; some difficulty fully relaxing between each contraction.   Education   Learner/Method Patient;Demonstration;Listening;Pictures/Video   Plan   Home program PTRX   Plan for next session discharged   Comments   Comments Pt returns for follow-up appt for urge incontinence. Pt reports that overall she has maybe seen some mild improvement with  ability to suppress urge to urinate however is still having leakage with urge during the daytime and getting up several times at night. Overall pt's PF strength and endurance did improve and was able to increase endurance holds to 10seconds. Today's session focused on condensing HEP and adjusting challenge. Pt is independent and confident with HEP and will continue to progress at home, pt agreeable to plan.   Total Session Time   Timed Code Treatment Minutes 50   Total Treatment Time (sum of timed and untimed services) 50

## 2024-09-05 ENCOUNTER — PRE VISIT (OUTPATIENT)
Dept: UROLOGY | Facility: CLINIC | Age: 64
End: 2024-09-05

## 2024-09-05 ENCOUNTER — OFFICE VISIT (OUTPATIENT)
Dept: UROLOGY | Facility: CLINIC | Age: 64
End: 2024-09-05
Payer: MEDICARE

## 2024-09-05 VITALS
OXYGEN SATURATION: 96 % | HEIGHT: 60 IN | WEIGHT: 126 LBS | HEART RATE: 73 BPM | DIASTOLIC BLOOD PRESSURE: 69 MMHG | BODY MASS INDEX: 24.74 KG/M2 | SYSTOLIC BLOOD PRESSURE: 113 MMHG

## 2024-09-05 DIAGNOSIS — N39.41 URGE INCONTINENCE OF URINE: ICD-10-CM

## 2024-09-05 DIAGNOSIS — G20.B1 DYSKINESIA DUE TO PARKINSON'S DISEASE (H): Primary | ICD-10-CM

## 2024-09-05 PROCEDURE — 99204 OFFICE O/P NEW MOD 45 MIN: CPT | Mod: 25 | Performed by: UROLOGY

## 2024-09-05 PROCEDURE — 51798 US URINE CAPACITY MEASURE: CPT | Performed by: UROLOGY

## 2024-09-05 RX ORDER — TROSPIUM CHLORIDE ER 60 MG/1
60 CAPSULE ORAL EVERY MORNING
Qty: 30 CAPSULE | Refills: 3 | Status: SHIPPED | OUTPATIENT
Start: 2024-09-05

## 2024-09-05 ASSESSMENT — PAIN SCALES - GENERAL: PAINLEVEL: NO PAIN (0)

## 2024-09-05 NOTE — LETTER
9/5/2024       RE: Fabiana Hu  [address] [facility] [address]     Dear Colleague,    Thank you for referring your patient, Fabiana Hu, to the [facility]. Please see a copy of my visit note below.    September 5, 2024    Referring Provider: SHLOMO Villareal CNP  [address]  [place] [address]    Primary Care Provider: No Ref-Primary, Physician    Assessment & Plan    Urge incontinence of urine  We discussed the treatments to include PT, medications anticholinergic and beta 3 agonists, intravesical botulinum toxin, percutaneous tibial nerve stimulation, sacral neuromodulation.  She wishes to try a different medication and will opt for tropsium given her concern for cognitive side effects.  Discussed can take beta 3 and anticholinergic together but she really would like to be only taking one pill if she can  - Adult Uro/Gyn  Referral  - POST-VOID RESIDUAL BLADDER SCAN  - trospium (SANCTURA XR) 60 MG CP24 24 hr capsule; Take 1 capsule (60 mg) by mouth every morning.    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  She is here for her urgency incontinence which she has had for some time.  S[facility] Dr Kohler back in 12[facility]19/2022 [facility]    Gemtesa from Dr Kohler  did not help so stopped.  Her neurologist then prescribed Myrbetriq which did not help so back on Gemtesa at this time.  Thinks it helps some not perfect but is concerned because of the cost and wonders what other treatment options there may be.    Constipation is now better, has a regimen    Denies gross hematuria, UTI, vaginal bleeding, vaginal bulge    Past Medical History:   Diagnosis Date     Parkinson's disease (H)      Urinary incontinence 2023    urge incontinence     Past Surgical History:   Procedure Laterality Date     breast surgery  2012    central duct excision right breast      SECTION      x2     EYE SURGERY Bilateral     cataract surgery     ORAL SURGERY IP CONSULT Left     oral mucosal lesion left     Social History     Socioeconomic History     Marital status:      Spouse name: Not on file     Number of children: Not on file     Years of education: Not on file     Highest education level: Not on file   Occupational History     Not on file   Tobacco Use     Smoking status: Never     Smokeless tobacco: Never   Substance and Sexual Activity     Alcohol use: Yes     Drug use: Not on file     Sexual activity: Not on file   Other Topics Concern     Not on file   Social History Narrative     Not on file     Social Determinants of Health     Financial Resource Strain: Low Risk  (2022)    Received [facility], [facility]    Financial Resource Strain      Difficulty of Paying Living Expenses: 3      Difficulty of Paying Living Expenses: Not on file   Food Insecurity: No Food Insecurity (2022)    Received from [facility] [facility], [facility]    Food Insecurity      Worried About Running Out of Food in the Last Year: 1   Transportation Needs: No Transportation Needs (2022)    Received from [facility] [employer] [facility], [facility]  [facility]    Transportation Needs      Lack of Transportation (Medical): 1   Physical Activity: Not on file   Stress: Not on file   Social Connections: Unknown (2023)    [facility] [employer] [facility], [facility]    Social Connections      Frequency of Communication with Friends and Family: Not on file   Interpersonal Safety: Not on file   Housing Stability: Low Risk  (2022)    [facility]    Housing Stability      Unable to Pay for Housing in the Last Year: 1     Family History   Problem Relation Age of Onset     Other - See Comments Mother         stomach tumor     LUNG DISEASE Mother          of pneumonia     Heart Disease Father      Cerebrovascular Disease Father      Other - See Comments Sister         long covid and headaches     Other - See Comments Brother      Depression Brother      Prostate Cancer Brother      Heart Disease Brother      Arthritis Brother         knee     Bipolar Disorder Daughter      Other - See Comments Daughter         living in [place]     Other - See Comments Son         living in [place]     Obesity Son      ROS    No Known Allergies    Current Outpatient Medications   Medication Sig Dispense Refill     buPROPion (WELLBUTRIN XL) 150 MG 24 hr tablet Take 150 mg by mouth every morning       CALCIUM-VITAMIN D-VITAMIN K PO Take 1 tablet by mouth daily       carbidopa-levodopa (SINEMET)  MG tablet 1/2 tab @ 7:30am, 11:30am, 4:30pm and 7:30pm, 1 tab @10pm 270 tablet 3     gabapentin (NEURONTIN) 400 MG capsule Take 400 mg by mouth at bedtime @8pm       GEMTESA 75 MG TABS tablet TAKE 1 TABLET BY MOUTH EVERY DAY 30 tablet 1     levothyroxine (SYNTHROID/LEVOTHROID) 75 MCG tablet Take 1 tablet by mouth daily       lithium (ESKALITH) 300 MG tablet Take 150 mg by mouth at bedtime        Current Facility-Administered Medications   Medication Dose Route Frequency Provider Last Rate Last Admin     botulinum toxin type B (MYOBLOC) injection 5,000 Units  5,000 Units Intramuscular See Admin Instructions          botulinum toxin type B (MYOBLOC) Solution 2,500 Units  2,500 Units Intramuscular See Admin Instructions Jammie West MD   2,500 Units at 04/30/24 1646     /69   Pulse 73   Ht 1.524 m (5')   Wt 57.2 kg (126 lb)   SpO2 96%   BMI 24.61 kg/m    GENERAL: healthy, alert and no distress  EYES: Eyes grossly normal to inspection, conjunctivae and sclerae normal  HENT: normal cephalic/atraumatic.  External ears, nose and mouth without ulcers or lesions.  RESP: no audible wheeze, cough, or visible cyanosis.  No visible retractions or increased work of breathing.  Able to speak fully in complete sentences.  NEURO: Cranial nerves grossly intact, mentation intact and speech normal  PSYCH: mentation appears normal, affect normal/bright, judgement and insight intact, normal speech and appearance well-groomed    PVR 2 mL by bladder scan      CC  Patient Care Team:  No Ref-Primary, Physician as PCP - General  Yancy Wright, PharmD as Pharmacist (Pharmacist)  Mirian Keene APRN CNP as Nurse Practitioner (Neurology)  Jammie West MD as MD (Neurology)  Valarie Fernandez, PhD LP as Assigned Behavioral Health Provider  Brandt Beckman MD as MD (Urology)  Yancy Wright, PharmD as Assigned MTM Pharmacist  Jammie West MD as Assigned Neuroscience Provider  Madhav Shah NP as Student  Beth Felix APRN CNP as Nurse Practitioner (OB/Gyn)  Sandhya Díaz MD as MD (Urology)  Beth Felix APRN CNP as Referring Physician (OB/Gyn)  Beth Felix APRN CNP as Assigned OBGYN Provider  BETH FELIX                Again, thank you for allowing me to participate in the care of your patient.      Sincerely,    Sandhya Díaz MD

## 2024-09-05 NOTE — PROGRESS NOTES
September 5, 2024    Referring Provider: SHLOMO Villareal CNP  [address] [facility] [address]  [place] [address]    Primary Care Provider: No Ref-Primary, Physician    Assessment & Plan     Urge incontinence of urine  We discussed the treatments to include PT, medications anticholinergic and beta 3 agonists, intravesical botulinum toxin, percutaneous tibial nerve stimulation, sacral neuromodulation.  She wishes to try a different medication and will opt for tropsium given her concern for cognitive side effects.  Discussed can take beta 3 and anticholinergic together but she really would like to be only taking one pill if she can  - Adult Uro/Gyn  Referral  - POST-VOID RESIDUAL BLADDER SCAN  - trospium (SANCTURA XR) 60 MG CP24 24 hr capsule; Take 1 capsule (60 mg) by mouth every morning.    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  She is here for her urgency incontinence which she has had for some time.  Saw Dr Kohler back in 12/19/2022 ([facility])    Gemtesa from Dr Kohler did not help so stopped.  Her neurologist then prescribed Myrbetriq which did not help so back on Gemtesa at this time.  Thinks it helps some not perfect but is concerned because of the cost and wonders what other treatment options there may be.    Constipation is now better, has a regimen    Denies  gross hematuria, UTI, vaginal bleeding, vaginal bulge    Past Medical History:   Diagnosis Date    Parkinson's disease (H)     Urinary incontinence 2023    urge incontinence     Past Surgical History:   Procedure Laterality Date    breast surgery  2012    central duct excision right breast     SECTION      x2    EYE SURGERY Bilateral     cataract surgery    ORAL SURGERY IP CONSULT Left     oral mucosal lesion left     Social History     Socioeconomic History    Marital status:      Spouse name: Not on file    Number of children: Not on file    Years of education: Not on file    Highest education level: Not on file   Occupational History    Not on file   Tobacco Use    Smoking status: Never    Smokeless tobacco: Never   Substance and Sexual Activity    Alcohol use: Yes    Drug use: Not on file    Sexual activity: Not on file   Other Topics Concern    Not on file   Social History Narrative    Not on file     Social Determinants of Health     Financial Resource Strain: Low Risk  (2022)    [facility] [facility]    Financial Resource Strain     Difficulty of Paying Living Expenses: 3     Difficulty of Paying Living Expenses: Not on file   Food Insecurity: No Food Insecurity (2022)    [facility] [employer] [facility]    Food Insecurity     Worried About Running Out of Food in the Last Year: 1   Transportation Needs: No Transportation Needs (2022)    [facility]    Transportation Needs     Lack of Transportation (Medical): 1   Physical Activity: Not on file   Stress: Not on file   Social Connections: Unknown (2023)    [facility] [employer] [facility]    Social  Connections     Frequency of Communication with Friends and Family: Not on file   Interpersonal Safety: Not on file   Housing Stability: Low Risk  (2022)    [facility]    Housing Stability     Unable to Pay for Housing in the Last Year: 1     Family History   Problem Relation Age of Onset    Other - See Comments Mother         stomach tumor    LUNG DISEASE Mother          of pneumonia    Heart Disease Father     Cerebrovascular Disease Father     Other - See Comments Sister         long covid and headaches    Other - See Comments Brother     Depression Brother     Prostate Cancer Brother     Heart Disease Brother     Arthritis Brother         knee    Bipolar Disorder Daughter     Other - See Comments Daughter         living in [place]    Other - See Comments Son         living in [place]    Obesity Son      ROS    No Known Allergies    Current Outpatient Medications   Medication Sig Dispense Refill    buPROPion (WELLBUTRIN XL) 150 MG 24 hr tablet Take 150 mg by mouth every morning      CALCIUM-VITAMIN D-VITAMIN K PO Take 1 tablet by mouth daily      carbidopa-levodopa (SINEMET)  MG tablet 1/2 tab @ 7:30am, 11:30am, 4:30pm and 7:30pm, 1 tab @10pm 270 tablet 3    gabapentin (NEURONTIN) 400 MG capsule Take 400 mg by mouth at bedtime @8pm      GEMTESA 75 MG TABS tablet TAKE 1 TABLET BY MOUTH EVERY DAY 30 tablet 1    levothyroxine (SYNTHROID/LEVOTHROID) 75 MCG tablet Take 1 tablet by mouth daily      lithium (ESKALITH) 300 MG tablet Take 150 mg by mouth at bedtime       Current Facility-Administered Medications   Medication Dose Route Frequency Provider Last Rate Last Admin    botulinum toxin type B (MYOBLOC) injection 5,000 Units  5,000 Units Intramuscular See Admin Instructions         botulinum toxin type B (MYOBLOC) Solution 2,500 Units  2,500 Units Intramuscular See Admin Instructions Jammie West MD    2,500 Units at 04/30/24 1646     /69   Pulse 73   Ht 1.524 m (5')   Wt 57.2 kg (126 lb)   SpO2 96%   BMI 24.61 kg/m    GENERAL: healthy, alert and no distress  EYES: Eyes grossly normal to inspection, conjunctivae and sclerae normal  HENT: normal cephalic/atraumatic.  External ears, nose and mouth without ulcers or lesions.  RESP: no audible wheeze, cough, or visible cyanosis.  No visible retractions or increased work of breathing.  Able to speak fully in complete sentences.  NEURO: Cranial nerves grossly intact, mentation intact and speech normal  PSYCH: mentation appears normal, affect normal/bright, judgement and insight intact, normal speech and appearance well-groomed    PVR 2 mL by bladder scan      CC  Patient Care Team:  No Ref-Primary, Physician as PCP - General  Ynacy Wright, PharmD as Pharmacist (Pharmacist)  Mirian Keene APRN CNP as Nurse Practitioner (Neurology)  Jammie West MD as MD (Neurology)  Valarie Fernandez, PhD LP as Assigned Behavioral Health Provider  Brandt Beckman MD as MD (Urology)  Yancy Wright, PharmD as Assigned MTM Pharmacist  Jammie West MD as Assigned Neuroscience Provider  Madhav Shah NP as Student  Beth Felix APRN CNP as Nurse Practitioner (OB/Gyn)  Sandhya Díaz MD as MD (Urology)  Beth Felix APRN CNP as Referring Physician (OB/Gyn)  Beth Felix APRN CNP as Assigned OBGYN Provider  BETH FELIX

## 2024-09-05 NOTE — NURSING NOTE
Chief Complaint   Patient presents with    Consult For     Incontinence  --pt states she is unable to give a urine sample         Blood pressure 113/69, pulse 73, height 1.524 m (5'), weight 57.2 kg (126 lb), SpO2 96%. Body mass index is 24.61 kg/m .    Patient Active Problem List   Diagnosis    Arteriovenous malformation (AVM)    Atypical nevus    Chest pain, unspecified    Colon adenomas    E. 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)    Sialorrhea    Lower urinary tract symptoms (LUTS)    Pelvic floor weakness in female       No Known Allergies    Current Outpatient Medications   Medication Sig Dispense Refill    buPROPion (WELLBUTRIN XL) 150 MG 24 hr tablet Take 150 mg by mouth every morning      CALCIUM-VITAMIN D-VITAMIN K PO Take 1 tablet by mouth daily      carbidopa-levodopa (SINEMET)  MG tablet 1/2 tab @ 7:30am, 11:30am, 4:30pm and 7:30pm, 1 tab @10pm 270 tablet 3    gabapentin (NEURONTIN) 400 MG capsule Take 400 mg by mouth at bedtime @8pm      GEMTESA 75 MG TABS tablet TAKE 1 TABLET BY MOUTH EVERY DAY 30 tablet 1    levothyroxine (SYNTHROID/LEVOTHROID) 75 MCG tablet Take 1 tablet by mouth daily      lithium (ESKALITH) 300 MG tablet Take 150 mg by mouth at bedtime         Social History     Tobacco Use    Smoking status: Never    Smokeless tobacco: Never   Substance Use Topics    Alcohol use: Yes       Larissa Kemp  9/5/2024  8:47 AM

## 2024-09-05 NOTE — PATIENT INSTRUCTIONS
Websites with free information:    [employer] patient website: www.voicesforpfd.org    Total Control Program: www.totalcontrolprogram.com    Stop the gemtesa    Start the trospium (let us know if the medication is too expensive)    It was a pleasure meeting with you today.  Thank you for allowing me and my team the privilege of caring for you today.  YOU are the reason we are here, and I truly hope we provided you with the excellent service you deserve.  Please let us know if there is anything else we can do for you so that we can be sure you are leaving completely satisfied with your care experience.

## 2024-09-10 PROBLEM — Z80.0 FH: STOMACH CANCER: Status: ACTIVE | Noted: 2024-09-10

## 2024-09-10 PROBLEM — D12.3 BENIGN NEOPLASM OF TRANSVERSE COLON: Status: ACTIVE | Noted: 2018-08-10

## 2024-09-10 PROBLEM — D12.2 BENIGN NEOPLASM OF ASCENDING COLON: Status: ACTIVE | Noted: 2023-10-24

## 2024-09-10 PROBLEM — N39.0 E. COLI UTI: Status: RESOLVED | Noted: 2021-11-05 | Resolved: 2024-09-10

## 2024-09-10 PROBLEM — Z86.0100 HISTORY OF COLONIC POLYPS: Status: ACTIVE | Noted: 2018-08-10

## 2024-09-10 PROBLEM — K63.5 POLYP OF COLON: Status: ACTIVE | Noted: 2018-08-08

## 2024-09-10 PROBLEM — B96.20 E. COLI UTI: Status: RESOLVED | Noted: 2021-11-05 | Resolved: 2024-09-10

## 2024-09-10 PROBLEM — Z80.0 FAMILY HISTORY OF COLON CANCER: Status: ACTIVE | Noted: 2024-09-10

## 2024-09-10 PROBLEM — A04.8 H. PYLORI INFECTION: Status: ACTIVE | Noted: 2024-09-10

## 2024-09-10 NOTE — PROGRESS NOTES
Diagnosis/Summary/Recommendations:    PATIENT: Fabiana Hu  64 year old female     : 1960    DEN: Oct 10, 2024       MRN: [id number]   [facility] [address]     [phone] (H)   [phone] (M)      [email or web address]     Brandt hu spouse  [phone]     Referral from PCP   # possible Parkinson's; followed by neurology  She is currently at 1/2 tab daily and will follow-up with neurology.      Seen at [facility]     Assessment:  (G20) Parkinsonism, unspecified Parkinsonism type (H)  (primary encounter diagnosis)  No family history of parkinson      3/31/2023 dopamine scan (datscan) - not sure what medication was taking   Decreased radiotracer uptake in the caudate nuclei/putamina suspicious for a dopaminergic degenerative process such as Parkinson's disease.      Review of diagnosis    Parkinsonism  Diagnosed 2022  Presumed left side onset and has left arm now swinging      Avoidance of dopamine blockers   Not taking     Motor complication review   Has involuntary leg movements - that have begun since been on sinemet     Review of Impulse control disorders   Denies      Review of surgical or medication options   reviewed     Gait/Balance/Falls   Fell out bed  No falls otherwise     Exercise/Therapy performed/offered   Physical therapy for shoulder   Did big therapy at [facility] mirta   Did some speech therapy at [facility]/Driving   [facility]  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 -   No changes in cognition  Nervous about driving      Mood   Depression - been getting worse lately; has informed her psychiatrist; working to increase Lithium  Anxiety  Diagnosed in [place] when was living there for 3  Years   Diagnosed   Has incident of it then in  - not hospitalized   Had incident of it  - not hospitalized  Never suicidal   Tried a lot of antidepressant  Been on lithium for a long time   Brother Scar has  "depression and done well with lithium  Daughter diagnosed with bipolar 2014 and is on lithium and doing okay - getting  this fall   Mood is stable  Sister is a nurse in [employer] sheyla      Spouse is retired and did software development  He now plays the [employer]     Psychiatry - Kw[facility] - [facility] - guille/SLP  Therapist - Marion     Hallucinations/delusions   No      Sleep   Sleep is \"horrible\"  Fall asleep okay but has trouble staying asleep. Worsened after starting Tropsium but can't say for certain is waking up due to dry mouth or something else  Has been working on sleep hygiene, using the Calm Patel  Goes to bed around 1030pm   Wake up 1-2x per night  Talking in her sleep  No longer on Melatonin. Didn't feel it was helpful  Not clear how long it has helped but it may have helped.   Sleep study was done at [facility] and diagnosed with  RBD  Had mild sleep apnea and tried cpap 5 years and not using this.   Has some creepy crawling feeling in bed when going to bed  Has some movement of legs to relieve symptoms  Not sure if there is PLMS - no leg jerking  Using Gabapentin for restlessness and dream enactment behavior. Dream enactment is much decreased and nightmares are better     Bladder/Renal/Prostate/Gyn/Other  E coli uti  Urinary incontinence sees specialist  Wearing a pad - it has been really bad   Urinary urgency  No significant dribbling or stress urinary incontinence.   Urinary frequency at night  Small volumes  Empty out.   Has not been on any treatment.   Was given sample of Gemtasa, but is cost-prohibitive and didn't work well. Tried Tropsium which is beneficial, but is having dry mouth  Has drinking a lot of water     GI/Constipation/GERD   Adenomas  Constipation has improved due to eating brown bread  Esophageal dysmotility and has not had motility study or an EGD  Has not had medications for this   h pylori diagnosed by Gi person and given antibiotic and tested " afterwards  Using tums as needed   Has belching problem with tablets. Now Pepto-Bismol     ENDO/Lipid/DM/Bone density/Thyroid  Hypothyroidism  Denies cholesterol or diabetes     Cardio/heart/Hyper or Hypotensive   AVM in right leg   Varicose veins  No blood pressure issue   No fainting or passing     Vision/Dry Eyes/Cataracts/Glaucoma/Macular   Wears glasses  Had cataract surgery bilateral      Heme/Anticoagulation/Antiplatelet/Anemia/Other  Not taking aspirin     ENT/Resp  Seasonal allergies  Smell loss for a long time  Drooling in the past  Has not had covid     Skin/Cancer/Seborrhea/other  Atypical nevus  Psoriasis   Lesions removed but not clear if cancer     Musculoskeletal/Pain/Headache  Has back pain and going for physical therapy. Has chronic low back pain with non-radiating symptoms that has gotten worse in the last 6mo. She can't recall an inciting incident for this. She will use a hot pad and lie down for this, which helps eventually    Has done big therapy and developed shoulder pain - left shoulder  Having right shoulder pain and going for physical therapy  Has some back pain     Other:  Fibroids  Breast lumps/mass  Had biopsy 2012   Broke a tooth     Pharmacy ([place]) consultation and medication management     Denys Stahl     Josea was expensive and did not try  Has not been tried on mirabegron (myrbetriq) 25mg daily     Genetic testing  [email or web address]  PDgeneration -   invitae      1. Would start with improving the neurogenic bladder issues - trial of mirabegron or other options     2. Sinemet dose in evening or during the night - using the short acting 1/2 tablet or trying long acting     3. Clean up timing of night time medication     4. Ongoing esophageal dysmotility/swallowing problems. - EGD or motility     5. Early discussion about deep brain stimulation (DBS) because of her dyskinesias in her legs that are occurring early in the course of disease.      6. Trial of amantadine  "and long acting amantadine (Gocovri)     7. Neuropsychological baseline evaluation     8. Pharmacy consultation as noted above.      9. Genetics consultation with Lee Joshua      10. Return to see Radha Keene NP to get baseline motor evaluation.         Parkinson's Disease:  Patient has a 1 year history of PD.  Tremors, gait, and bed mobility have improved with Sinemet. However, she is experiencing dyskinesias in Lt body that is bothersome.   MDS UPDRS PART II    8/24/2023  2:02 PM   UPDRS EDL Scale     2.1 Speech 0    2.2 Salivation 4    2.3 Chew & Swallow 1    2.4 Eating  0    2.5 Dressing  0    2.6 Hygiene  0    2.7 Handwriting  0    2.8 Hobbies etc.  0    2.9 Turn in bed  0    2.10 Tremor  1    2.11 Stand from chair  0    2.12 Walking & Balance  3    2.13 Freezing  0    MDS-UPDRS II Total Score 9            Sialorrhea: Bothersome. Using sugarless candy.         __  The following patient instructions provided: -      __  You can take Sinemet with juice or carbonated beverages.      __  After your daughter's wedding, consider going down on the Sinemet.     ___ Week 1: Try reducing the 4:30 pm dose.     PD Medications 7:30 am 11:30 am 4:30 am 7:30 pm   Sinemet 25/100 mg  1 1 1/2 1/2      __  Week 2:  If  you still have bothersome dyskinesias, try reducing either the 7:30 or 11:30 am dose from 1 tab to 1/2 tab.     __  You can keep going down to 1/2 tab 4 times a day.  If tremors, slowness, & gait difficulty become worse, let us know.     __  Check your Melatonin bottle for \"USP\" symbol.      Yue Mahajan Pharm.D. recommends for OTC supplements to \"USP\" symbol. USP stands for the \"United [place] Pharmacopoeia.\" USP approval means you can be assured of purity, potency, stability and disintegration. Essentially it ensures that the product contains the ingredients listed on the label and that it has been made according to FDA Good Manufacturing Practices.     The two brands that always have \"USP\" are \"Nature " "Made\" and \"Ford Signature\" from [employer].      __  Here is the article on the Co Q 10 Enzyme trial.      https://jamanetwork.com/journals/jamaneurology/fullarticle/6452075#:~:text=In%20summary%2C%20although%20the%20QE3,the%20treatment%20of%20early%20PD.     __  Referral to Neurology for evaluation and possible Botox injection.  You can see Dr. West.      __  I'll have one of the nurses call you regarding the  Class.      __ Return in 3 months to see Dr. Weston. You may return sooner as needed.      Cognitive evaluation 9/12/2023 Dr. Fernandez     Fabiana Hu is a 63 year old woman with a history of Parkinson's disease diagnosed about a year ago.  Her most bothersome symptoms now are dyskinesias.  This neuropsychological evaluation was undertaken to establish a neurocognitive baseline.     Results indicate performance that falls almost entirely within normal limits across cognitive domains, generally in the average to above average range. There is subtle executive dysfunction manifested in difficulty shifting cognitive set. Memory, language, attention, and visual processing all fall well within normal limits. She is not reporting significant depressive symptomatology or apathy.     This pattern of performance is suggestive of at most, subtle frontal system involvement. The findings do not reflect dementia at this time, nor are they suggestive of Mild Cognitive Impairment. While similar patterns may be observed with Parkinson's disease, it is also possible that nonrestorative sleep, and potentially medications, underlie her subtle cognitive inefficiencies.      Botox injection 10/24/2023 ???    Belching is better  Drinking water with orange juice to get medications down     No travel plans.      Dry eyes  Over the counter product  Pharmacy ([place]) consultation and medication management  Please call the scheduling number I@ [phone] to set up an appointment with pharmacists Yue Mahajan or Yancy Wright. "      Urinary urgency/frequency  Trial of 50mg of mirabegron  Physical therapy referral  Urology referral -      Denies constipation presently  Had a colonoscopy which was a disaster and had to do a second clean out  Had 3 polyps removed and will need to go back in 3 years.      [facility]  [email or web address]  Therapist Marion - on line   medication manager   Silvia Faulkner  DNP, APRN, CNP, PMHNP-BC  Psychiatric Nurse Practitioner     Hopefully lithium will be reduced  Is on gabapentin neurontin 400mg at night  Using wellbutrin xl 150mg in the morning     Melatonin 10mg at night.     Dyskinesias are better with lower dose of sinemet  No freezing  No falls  Exercising - has a fitness class for parkinson patients   [employer] class  Walking weekly with friends.   Virtual class may join     Botox was done and not clear it was that helpful but will try a bigger dose.       AREA/MUSCLE INJECTED:    Muscles Injected Units Injected Number of Injections   Right Parotid 1250 (1000) 1   Left Parotid 1250 (1000) 1                   Total Units Injected: 2500     Unavoidable Waste: 0     Total Units Billed 2500     ( ) = previous dose       Medications      730 8/830 1130 430p 730p 8p 9:30p 10p   Botulinum toxin type B myobloc  trial                Bupropion wellbutrin XL 150mg 24hr    1              Calcium Vit D Vit K            Carbidopa/levodopa Sinemet 25/100  1   1/2 1/2 1/2    1     Gabapentin neurontin 400mg            1      Gabapentin neurontin 100mg        off    Levothyroxine synthroid levothyroid 75mcg 1                 Lithium eskalith 300mg             1      Melatonin 10mg extended release (10mg B6)            off      Mirabegron myrbetriq 50mg  off                Virabegron gemtesa 75mg  1 prn          Trospium sancturia XR 60mg 24hr   off                Pepto-Bismol        2prn     2prn                                                                                              Plan:  - Continue on your current Carbidopa/Levodopa dose  - Try pushing your Gabapentin later in the day. Taking it around 10pm rather than 8pm in hopes of helping with sleep. If this doesn't solve the problem, can increase to taking Gabapentin 800mg (1 tablet 400mg + 2 tablets 100mg) at night  - Consider using Biotene spray or mouth tape for dry mouth at night  - Continue to work with your psychiatrist and therapist for ongoing management of your anxiety/depression. We'll continue to monitor you on this recent increase in Lithium  - Continue to exercise and stretch as much as you can. Hopefully this will help with your back.  - PT referral for musculoskeletal back pain + parkinson's  - OT referral  - Follow-up in 4mo with Radha Keene      Coding statement:   Medical Decision Making:  #  Chronic progressive medical conditions addressed  - see above --   Review and/or interpretation of unique test or documentation from a provider outside of neurology no   Independent historian provided additional details  yes I  Prescription drug management and review of potential side effects and/or monitoring for side effects  -- see above ---  Health impacted by social determinants of health  no    I have reviewed the note as documented above.  This accurately captures the substance of my conversation with the patient and total time spent preparing for visit, executing visit and completing visit on the day of the visit:  30 minutes.  The portion of this total time included face to face time     The longitudinal plan of care for Fabiana Hu was addressed during this visit. Due to the added complexity in care, I will continue to support Fabiana Hu in the subsequent management of this condition(s) and with the ongoing continuity of care of this condition(s).      Ramon Weston MD     ______________________________________    Last visit date and details:              ______________________________________      Patient was asked about 14 Review of systems including changes in vision (dry eyes, double vision), hearing, heart, lungs, musculoskeletal, depression, anxiety, snoring, RBD, insomnia, urinary frequency, urinary urgency, constipation, swallowing problems, hematological, ID, allergies, skin problems: seborrhea, endocrinological: thyroid, diabetes, cholesterol; balance, weight changes, and other neurological problems and these were not significant at this time except for   Patient Active Problem List   Diagnosis    Arteriovenous malformation (AVM)    Atypical nevus    Colon adenomas    Hypothyroidism    Fibroids    Major depressive disorder, recurrent episode (H24)    Parkinson's disease (H)    Psoriasis    Seasonal allergies    Severe episode of recurrent major depressive disorder, with psychotic features (H)    Varicose veins of other specified sites    Urinary incontinence    Adenomatous polyp of colon    Seasonal allergic rhinitis    Major depressive disorder    Dyskinesia due to Parkinson's disease (H)    Sialorrhea    Lower urinary tract symptoms (LUTS)    Pelvic floor weakness in female    Benign neoplasm of ascending colon    Benign neoplasm of transverse colon    Family history of colon cancer    FH: stomach cancer    H. pylori infection    History of colonic polyps    Polyp of colon        No Known Allergies  Past Surgical History:   Procedure Laterality Date    breast surgery  2012    central duct excision right breast     SECTION      x2    EYE SURGERY Bilateral     cataract surgery    ORAL SURGERY IP CONSULT Left     oral mucosal lesion left     Past Medical History:   Diagnosis Date    Parkinson's disease (H)     Urinary incontinence 2023    urge incontinence     Social History     Socioeconomic History    Marital status:      Spouse name: Not on file    Number of children: Not on file    Years of education: Not on file     Highest education level: Not on file   Occupational History    Not on file   Tobacco Use    Smoking status: Never    Smokeless tobacco: Never   Substance and Sexual Activity    Alcohol use: Yes    Drug use: Not on file    Sexual activity: Not on file   Other Topics Concern    Not on file   Social History Narrative    Not on file     Social Determinants of Health     Financial Resource Strain: Low Risk  (7/27/2022)    Received from [facility] [email or web address] [facility], [facility]    Financial Resource Strain     Difficulty of Paying Living Expenses: 3     Difficulty of Paying Living Expenses: Not on file   Food Insecurity: No Food Insecurity (7/27/2022)    [facility]    Food Insecurity     Worried About Running Out of Food in the Last Year: 1   Transportation Needs: No Transportation Needs (7/27/2022)    [facility]    Transportation Needs     Lack of Transportation (Medical): 1   Physical Activity: Not on file   Stress: Not on file   Social Connections: Unknown (8/1/2023)    Received from [facility] [email or web address] [facility], [facility]    Social Connections     Frequency of Communication with Friends and Family: Not on file   Interpersonal Safety: Not on file   Housing Stability: Low Risk  (7/27/2022)    Received from [facility] [employer]  [email or web address] [facility], [facility]    Housing Stability     Unable to Pay for Housing in the Last Year: 1       Drug and lactation database from the [place] National Library of Medicine:  http://toxnet.nlm.nih.gov/cgi-bin/sis/htmlgen?LACT      B/P: Data Unavailable, T: Data Unavailable, P: Data Unavailable, R: Data Unavailable 0 lbs 0 oz  There were no vitals taken for this  visit., There is no height or weight on file to calculate BMI.  Medications and Vitals not listed above were documented in the cart and reviewed by me.     Current Outpatient Medications   Medication Sig Dispense Refill    buPROPion (WELLBUTRIN XL) 150 MG 24 hr tablet Take 150 mg by mouth every morning      CALCIUM-VITAMIN D-VITAMIN K PO Take 1 tablet by mouth daily      carbidopa-levodopa (SINEMET)  MG tablet 1/2 tab @ 7:30am, 11:30am, 4:30pm and 7:30pm, 1 tab @10pm 270 tablet 3    gabapentin (NEURONTIN) 400 MG capsule Take 400 mg by mouth at bedtime @8pm      GEMTESA 75 MG TABS tablet TAKE 1 TABLET BY MOUTH EVERY DAY 30 tablet 1    levothyroxine (SYNTHROID/LEVOTHROID) 75 MCG tablet Take 1 tablet by mouth daily      lithium (ESKALITH) 300 MG tablet Take 150 mg by mouth at bedtime      trospium (SANCTURA XR) 60 MG CP24 24 hr capsule Take 1 capsule (60 mg) by mouth every morning. 30 capsule 3         Ramon Weston MD

## 2024-09-16 ENCOUNTER — TELEPHONE (OUTPATIENT)
Dept: NEUROLOGY | Facility: CLINIC | Age: 64
End: 2024-09-16
Payer: MEDICARE

## 2024-09-16 NOTE — TELEPHONE ENCOUNTER
Yovanny Haines,    This patient is requesting a call back because she has questions about the new medication (Trospium Chloride) that Dr. Sandhya Díaz prescribed her. She wants to know how it would interact with the other medications she's already currently on. You can reach her at [phone].     Thanks!  Davin Vicente MTFERCHO

## 2024-09-17 NOTE — TELEPHONE ENCOUNTER
Returned call to patient.  She wondered if she can take newly-prescribed trospium with carbidopa/levodopa at 7:30am in order to take it on an empty stomach.  Advised that this would be ok. Discussed more common side effects of dry mouth and/or constipation and she will monitor.    Yancy Wright, PharmD  Medication Therapy Management Pharmacist  [facility]

## 2024-09-27 DIAGNOSIS — R39.9 LOWER URINARY TRACT SYMPTOMS: ICD-10-CM

## 2024-09-27 NOTE — TELEPHONE ENCOUNTER
RX Authorization    Medication: GEMTESA 75 MG TABS tablet     Date last refill ordered: 8/23/24    Quantity ordered: 30    # refills: 1    Date of last clinic visit with ordering provider: 4/9/24 (Radha)    Date of next clinic visit with ordering provider: 10/10/24

## 2024-09-29 RX ORDER — VIBEGRON 75 MG/1
75 TABLET, FILM COATED ORAL DAILY
Qty: 90 TABLET | Refills: 1 | Status: SHIPPED | OUTPATIENT
Start: 2024-09-29

## 2024-10-07 DIAGNOSIS — N39.41 URGE INCONTINENCE OF URINE: Primary | ICD-10-CM

## 2024-10-08 ENCOUNTER — TELEPHONE (OUTPATIENT)
Dept: UROLOGY | Facility: CLINIC | Age: 64
End: 2024-10-08
Payer: MEDICARE

## 2024-10-08 DIAGNOSIS — N39.41 URGE INCONTINENCE OF URINE: ICD-10-CM

## 2024-10-08 NOTE — TELEPHONE ENCOUNTER
OXYTROL 3.9 MG/24HR BIW patch          Changed from: oxyBUTYnin (OXYTROL) 3.9 MG/24HR BIW patch     Possible duplicate: Josue to review recent actions on this medication    Sig: PLACE 1 PATCH OVER 96 HOURS ONTO THE SKIN TWICE A WEEK.    Disp: 8 patch    Refills: 3    Start: 10/8/2024    Class: E-Prescribe    Non-formulary For: Urge incontinence of urine    Last ordered: Today (10/8/2024) by Sandhya Díaz MD    Last refill: 10/8/2024    Rx #: [id number]    Pharmacy comment: Alternative Requested:REQUIRES PRIOR AUTHORIZATION.

## 2024-10-08 NOTE — TELEPHONE ENCOUNTER
Prior Authorization Retail Medication Request    Medication/Dose: PA-oxybutynin patch  Diagnosis and ICD code (if different than what is on RX):  overactive bladder  New/renewal/insurance change PA/secondary ins. PA:  Previously Tried and Failed:  Trospium   Rationale:  patient needs assistance symptoms of overactive bladder-mostly urgency    Insurance   Primary: Medicare  Insurance ID:  [id number]     Secondary (if applicable):[facility]  Insurance ID:   [id number]     Pharmacy Information (if different than what is on RX)  Name:  [facility] [place]  Phone:  [phone]  Fax:  [phone]    Clinic Information  Preferred routing pool for dept communication: urology triage pool

## 2024-10-10 ENCOUNTER — OFFICE VISIT (OUTPATIENT)
Dept: NEUROLOGY | Facility: CLINIC | Age: 64
End: 2024-10-10
Payer: MEDICARE

## 2024-10-10 VITALS
BODY MASS INDEX: 23.77 KG/M2 | WEIGHT: 121.7 LBS | OXYGEN SATURATION: 97 % | HEART RATE: 80 BPM | SYSTOLIC BLOOD PRESSURE: 127 MMHG | DIASTOLIC BLOOD PRESSURE: 77 MMHG

## 2024-10-10 DIAGNOSIS — G20.A1 PARKINSON'S DISEASE, UNSPECIFIED WHETHER DYSKINESIA PRESENT, UNSPECIFIED WHETHER MANIFESTATIONS FLUCTUATE (H): Primary | ICD-10-CM

## 2024-10-10 PROCEDURE — 99214 OFFICE O/P EST MOD 30 MIN: CPT | Performed by: PSYCHIATRY & NEUROLOGY

## 2024-10-10 PROCEDURE — G2211 COMPLEX E/M VISIT ADD ON: HCPCS | Performed by: PSYCHIATRY & NEUROLOGY

## 2024-10-10 NOTE — LETTER
10/10/2024       RE: Fabiana Hu   [address]  [facility] [address]     Dear Colleague,    Thank you for referring your patient, Fabiana Hu, to the [facility] [place] at [facility]. Please see a copy of my visit note below.      Diagnosis/Summary/Recommendations:    PATIENT: Fabiana Hu  64 year old female     : 1960    DEN: Oct 10, 2024       MRN: [id number]   W ISAC SCHNEIDER   [facility] [address]     [phone] ()   [phone] ()      [email or web address]     Brandt hu spouse  [phone]     Referral from PCP   # possible Parkinson's; followed by neurology  She is currently at 1/2 tab daily and will follow-up with neurology.      Seen at [facility]     Assessment:  (G20) Parkinsonism, unspecified Parkinsonism type (H)  (primary encounter diagnosis)  No family history of parkinson      3/31/2023 dopamine scan (datscan) - not sure what medication was taking   Decreased radiotracer uptake in the caudate nuclei/putamina suspicious for a dopaminergic degenerative process such as Parkinson's disease.      Review of diagnosis    Parkinsonism  Diagnosed 2022  Presumed left side onset and has left arm now swinging      Avoidance of dopamine blockers   Not taking     Motor complication review   Has involuntary leg movements - that have begun since been on sinemet     Review of Impulse control disorders   Denies      Review of surgical or medication options   reviewed     Gait/Balance/Falls   Fell out bed  No falls otherwise     Exercise/Therapy performed/offered   Physical therapy for shoulder   Did big therapy at [facility] mir[facility]   Did some speech therapy at [place]      Cognitive/Driving   [place]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 -   No changes in cognition  Nervous about driving      Mood   Depression - been getting worse lately; has informed her psychiatrist; working to  "increase Lithium  Anxiety  Diagnosed in [place] when was living there for 3  Years 1998  Diagnosed 1999  Has incident of it then in 1999 - not hospitalized   Had incident of it 2010 - not hospitalized  Never suicidal   Tried a lot of antidepressant  Been on lithium for a long time   Brother Scar has depression and done well with lithium  Daughter diagnosed with bipolar 2014 and is on lithium and doing okay - getting  this fall   Mood is stable  Sister is a nurse in [employer] dheerajny      Spouse is retired and did software development  He now plays the [employer]     Psychiatry - [facility] - [facility]  Therapist - [place]     Hallucinations/delusions   No      Sleep   Sleep is \"horrible\"  Fall asleep okay but has trouble staying asleep. Worsened after starting Tropsium but can't say for certain is waking up due to dry mouth or something else  Has been working on sleep hygiene, using the Calm Patel  Goes to bed around 1030pm   Wake up 1-2x per night  Talking in her sleep  No longer on Melatonin. Didn't feel it was helpful  Not clear how long it has helped but it may have helped.   Sleep study was done at [facility] and diagnosed with  RBD  Had mild sleep apnea and tried cpap 5 years and not using this.   Has some creepy crawling feeling in bed when going to bed  Has some movement of legs to relieve symptoms  Not sure if there is PLMS - no leg jerking  Using Gabapentin for restlessness and dream enactment behavior. Dream enactment is much decreased and nightmares are better     Bladder/Renal/Prostate/Gyn/Other  E coli uti  Urinary incontinence sees specialist  Wearing a pad - it has been really bad   Urinary urgency  No significant dribbling or stress urinary incontinence.   Urinary frequency at night  Small volumes  Empty out.   Has not been on any treatment.   Was given sample of Gemtasa, but is cost-prohibitive and didn't work well. Tried Tropsium which is beneficial, but is " having dry mouth  Has drinking a lot of water     GI/Constipation/GERD   Adenomas  Constipation has improved due to eating brown bread  Esophageal dysmotility and has not had motility study or an EGD  Has not had medications for this   h pylori diagnosed by Gi person and given antibiotic and tested afterwards  Using tums as needed   Has belching problem with tablets. Now Pepto-Bismol     ENDO/Lipid/DM/Bone density/Thyroid  Hypothyroidism  Denies cholesterol or diabetes     Cardio/heart/Hyper or Hypotensive   AVM in right leg   Varicose veins  No blood pressure issue   No fainting or passing     Vision/Dry Eyes/Cataracts/Glaucoma/Macular   Wears glasses  Had cataract surgery bilateral      Heme/Anticoagulation/Antiplatelet/Anemia/Other  Not taking aspirin     ENT/Resp  Seasonal allergies  Smell loss for a long time  Drooling in the past  Has not had covid     Skin/Cancer/Seborrhea/other  Atypical nevus  Psoriasis   Lesions removed but not clear if cancer     Musculoskeletal/Pain/Headache  Has back pain and going for physical therapy. Has chronic low back pain with non-radiating symptoms that has gotten worse in the last 6mo. She can't recall an inciting incident for this. She will use a hot pad and lie down for this, which helps eventually    Has done big therapy and developed shoulder pain - left shoulder  Having right shoulder pain and going for physical therapy  Has some back pain     Other:  Fibroids  Breast lumps/mass  Had biopsy 2012   Broke a tooth     Pharmacy ([place]) consultation and medication management     Denys Stahl     Gemtesa was expensive and did not try  Has not been tried on mirabegron (myrbetriq) 25mg daily     Genetic testing  [email or web address]  PDgeneration -   invitae      1. Would start with improving the neurogenic bladder issues - trial of mirabegron or other options     2. Sinemet dose in evening or during the night - using the short acting 1/2 tablet or trying long acting    "  3. Clean up timing of night time medication     4. Ongoing esophageal dysmotility/swallowing problems. - EGD or motility     5. Early discussion about deep brain stimulation (DBS) because of her dyskinesias in her legs that are occurring early in the course of disease.      6. Trial of amantadine and long acting amantadine (Gocovri)     7. Neuropsychological baseline evaluation     8. Pharmacy consultation as noted above.      9. Genetics consultation with Lee Lewis      10. Return to see Radha Keene NP to get baseline motor evaluation.         Parkinson's Disease:  Patient has a 1 year history of PD.  Tremors, gait, and bed mobility have improved with Sinemet. However, she is experiencing dyskinesias in Lt body that is bothersome.   MDS UPDRS PART II    8/24/2023  2:02 PM   UPDRS EDL Scale     2.1 Speech 0    2.2 Salivation 4    2.3 Chew & Swallow 1    2.4 Eating  0    2.5 Dressing  0    2.6 Hygiene  0    2.7 Handwriting  0    2.8 Hobbies etc.  0    2.9 Turn in bed  0    2.10 Tremor  1    2.11 Stand from chair  0    2.12 Walking & Balance  3    2.13 Freezing  0    MDS-UPDRS II Total Score 9            Sialorrhea: Bothersome. Using sugarless candy.         __  The following patient instructions provided: -      __  You can take Sinemet with juice or carbonated beverages.      __  After your daughter's wedding, consider going down on the Sinemet.     ___ Week 1: Try reducing the 4:30 pm dose.     PD Medications 7:30 am 11:30 am 4:30 am 7:30 pm   Sinemet 25/100 mg  1 1 1/2 1/2      __  Week 2:  If  you still have bothersome dyskinesias, try reducing either the 7:30 or 11:30 am dose from 1 tab to 1/2 tab.     __  You can keep going down to 1/2 tab 4 times a day.  If tremors, slowness, & gait difficulty become worse, let us know.     __  Check your Melatonin bottle for \"USP\" symbol.      Yue Mahajan, Pharm.D. recommends for OTC supplements to \"USP\" symbol. USP stands for the \"United [place] Pharmacopoeia.\" USP " "approval means you can be assured of purity, potency, stability and disintegration. Essentially it ensures that the product contains the ingredients listed on the label and that it has been made according to FDA Good Manufacturing Practices.     The two brands that always have \"USP\" are \"Nature Made\" and \"Ford Signature\" from [employer].      __  Here is the article on the Co Q 10 Enzyme trial.      https://jamanetwork.com/journals/jamaneurology/fullarticle/6999845#:~:text=In%20summary%2C%20although%20the%20QE3,the%20treatment%20of%20early%20PD.     __  Referral to Neurology for evaluation and possible Botox injection.  You can see Dr. West.      __  I'll have one of the nurses call you regarding the  Class.      __ Return in 3 months to see Dr. Weston. You may return sooner as needed.      Cognitive evaluation 9/12/2023 Dr. Fernandez     Fabiana Hu is a 63 year old woman with a history of Parkinson's disease diagnosed about a year ago.  Her most bothersome symptoms now are dyskinesias.  This neuropsychological evaluation was undertaken to establish a neurocognitive baseline.     Results indicate performance that falls almost entirely within normal limits across cognitive domains, generally in the average to above average range. There is subtle executive dysfunction manifested in difficulty shifting cognitive set. Memory, language, attention, and visual processing all fall well within normal limits. She is not reporting significant depressive symptomatology or apathy.     This pattern of performance is suggestive of at most, subtle frontal system involvement. The findings do not reflect dementia at this time, nor are they suggestive of Mild Cognitive Impairment. While similar patterns may be observed with Parkinson's disease, it is also possible that nonrestorative sleep, and potentially medications, underlie her subtle cognitive inefficiencies.      Botox injection 10/24/2023 ???    Annie is " better  Drinking water with orange juice to get medications down     No travel plans.      Dry eyes  Over the counter product  Pharmacy ([place]) consultation and medication management  Please call the scheduling number I@ [phone] to set up an appointment with pharmacists Yue Mahajan or Yancy Wright.      Urinary urgency/frequency  Trial of 50mg of mirabegron  Physical therapy referral  Urology referral -      Denies constipation presently  Had a colonoscopy which was a disaster and had to do a second clean out  Had 3 polyps removed and will need to go back in 3 years.      [facility]  [email or web address]  Therapist Marion - on line   medication manager   Silvia Faulkner  DNP, APRN, CNP, PMHNP-BC  Psychiatric Nurse Practitioner     Hopefully lithium will be reduced  Is on gabapentin neurontin 400mg at night  Using wellbutrin xl 150mg in the morning     Melatonin 10mg at night.     Dyskinesias are better with lower dose of sinemet  No freezing  No falls  Exercising - has a fitness class for parkinson patients   [employer] class  Walking weekly with friends.   Virtual class may join     Botox was done and not clear it was that helpful but will try a bigger dose.       AREA/MUSCLE INJECTED:    Muscles Injected Units Injected Number of Injections   Right Parotid 1250 (1000) 1   Left Parotid 1250 (1000) 1                   Total Units Injected: 2500     Unavoidable Waste: 0     Total Units Billed 2500     ( ) = previous dose       Medications      730 8/830 1130 430p 730p 8p 9:30p 10p   Botulinum toxin type B myobloc  trial                Bupropion wellbutrin XL 150mg 24hr    1              Calcium Vit D Vit K            Carbidopa/levodopa Sinemet 25/100  1   1/2 1/2 1/2    1     Gabapentin neurontin 400mg            1      Gabapentin neurontin 100mg        off    Levothyroxine synthroid levothyroid 75mcg 1                 Lithium eskalith 300mg              1      Melatonin 10mg extended release (10mg B6)            off      Mirabegron myrbetriq 50mg  off                Virabegron gemtesa 75mg  1 prn          Trospium sancturia XR 60mg 24hr   off                Pepto-Bismol        2prn     2prn                                                                                             Plan:  - Continue on your current Carbidopa/Levodopa dose  - Try pushing your Gabapentin later in the day. Taking it around 10pm rather than 8pm in hopes of helping with sleep. If this doesn't solve the problem, can increase to taking Gabapentin 800mg (1 tablet 400mg + 2 tablets 100mg) at night  - Consider using Biotene spray or mouth tape for dry mouth at night  - Continue to work with your psychiatrist and therapist for ongoing management of your anxiety/depression. We'll continue to monitor you on this recent increase in Lithium  - Continue to exercise and stretch as much as you can. Hopefully this will help with your back.  - PT referral for musculoskeletal back pain + parkinson's  - OT referral  - Follow-up in 4mo with Radha Keene      Coding statement:   Medical Decision Making:  #  Chronic progressive medical conditions addressed  - see above --   Review and/or interpretation of unique test or documentation from a provider outside of neurology no   Independent historian provided additional details  yes I  Prescription drug management and review of potential side effects and/or monitoring for side effects  -- see above ---  Health impacted by social determinants of health  no    I have reviewed the note as documented above.  This accurately captures the substance of my conversation with the patient and total time spent preparing for visit, executing visit and completing visit on the day of the visit:  30 minutes.  The portion of this total time included face to face time     The longitudinal plan of care for Fabiana Hu was addressed during this visit. Due to the added  complexity in care, I will continue to support Fabiana Hu in the subsequent management of this condition(s) and with the ongoing continuity of care of this condition(s).      Ramon Weston MD     ______________________________________    Last visit date and details:             ______________________________________      Patient was asked about 14 Review of systems including changes in vision (dry eyes, double vision), hearing, heart, lungs, musculoskeletal, depression, anxiety, snoring, RBD, insomnia, urinary frequency, urinary urgency, constipation, swallowing problems, hematological, ID, allergies, skin problems: seborrhea, endocrinological: thyroid, diabetes, cholesterol; balance, weight changes, and other neurological problems and these were not significant at this time except for   Patient Active Problem List   Diagnosis     Arteriovenous malformation (AVM)     Atypical nevus     Colon adenomas     Hypothyroidism     Fibroids     Major depressive disorder, recurrent episode (H24)     Parkinson's disease (H)     Psoriasis     Seasonal allergies     Severe episode of recurrent major depressive disorder, with psychotic features (H)     Varicose veins of other specified sites     Urinary incontinence     Adenomatous polyp of colon     Seasonal allergic rhinitis     Major depressive disorder     Dyskinesia due to Parkinson's disease (H)     Sialorrhea     Lower urinary tract symptoms (LUTS)     Pelvic floor weakness in female     Benign neoplasm of ascending colon     Benign neoplasm of transverse colon     Family history of colon cancer     FH: stomach cancer     H. pylori infection     History of colonic polyps     Polyp of colon        No Known Allergies  Past Surgical History:   Procedure Laterality Date     breast surgery  2012    central duct excision right breast      SECTION      x2     EYE SURGERY Bilateral     cataract surgery     ORAL SURGERY IP CONSULT Left     oral mucosal lesion left     Past  Medical History:   Diagnosis Date     Parkinson's disease (H)      Urinary incontinence 05/07/2023    urge incontinence     Social History     Socioeconomic History     Marital status:      Spouse name: Not on file     Number of children: Not on file     Years of education: Not on file     Highest education level: Not on file   Occupational History     Not on file   Tobacco Use     Smoking status: Never     Smokeless tobacco: Never   Substance and Sexual Activity     Alcohol use: Yes     Drug use: Not on file     Sexual activity: Not on file   Other Topics Concern     Not on file   Social History Narrative     Not on file     Social Determinants of Health     Financial Resource Strain: Low Risk  (7/27/2022)    [facility]    Financial Resource Strain      Difficulty of Paying Living Expenses: 3      Difficulty of Paying Living Expenses: Not on file   Food Insecurity: No Food Insecurity (7/27/2022)    [facility]    Food Insecurity      Worried About Running Out of Food in the Last Year: 1   Transportation Needs: No Transportation Needs (7/27/2022)    [facility]    Transportation Needs      Lack of Transportation (Medical): 1   Physical Activity: Not on file   Stress: Not on file   Social Connections: Unknown (8/1/2023)    [facility]    Social Connections      Frequency of Communication with Friends and Family: Not on file   Interpersonal Safety: Not on file   Housing Stability: Low Risk  (7/27/2022)    [facility], [facility]    Housing Stability      Unable to  Pay for Housing in the Last Year: 1       Drug and lactation database from the [place] National Library of Medicine:  http://toxnet.nlm.nih.gov/cgi-bin/sis/htmlgen?LACT      B/P: Data Unavailable, T: Data Unavailable, P: Data Unavailable, R: Data Unavailable 0 lbs 0 oz  There were no vitals taken for this visit., There is no height or weight on file to calculate BMI.  Medications and Vitals not listed above were documented in the cart and reviewed by me.     Current Outpatient Medications   Medication Sig Dispense Refill     buPROPion (WELLBUTRIN XL) 150 MG 24 hr tablet Take 150 mg by mouth every morning       CALCIUM-VITAMIN D-VITAMIN K PO Take 1 tablet by mouth daily       carbidopa-levodopa (SINEMET)  MG tablet 1/2 tab @ 7:30am, 11:30am, 4:30pm and 7:30pm, 1 tab @10pm 270 tablet 3     gabapentin (NEURONTIN) 400 MG capsule Take 400 mg by mouth at bedtime @8pm       GEMTESA 75 MG TABS tablet TAKE 1 TABLET BY MOUTH EVERY DAY 30 tablet 1     levothyroxine (SYNTHROID/LEVOTHROID) 75 MCG tablet Take 1 tablet by mouth daily       lithium (ESKALITH) 300 MG tablet Take 150 mg by mouth at bedtime       trospium (SANCTURA XR) 60 MG CP24 24 hr capsule Take 1 capsule (60 mg) by mouth every morning. 30 capsule 3         Ramon Weston MD      Again, thank you for allowing me to participate in the care of your patient.      Sincerely,    Ramon Weston MD

## 2024-10-10 NOTE — NURSING NOTE
Chief Complaint   Patient presents with    RECHECK     Return movement disorder     Jensen Baugh

## 2024-10-10 NOTE — PATIENT INSTRUCTIONS
Medications      730 8/830 1130 430p 730p 8p 9:30p 10p   Botulinum toxin type B myobloc  trial                Bupropion wellbutrin XL 150mg 24hr    1              Calcium Vit D Vit K            Carbidopa/levodopa Sinemet 25/100  1   1/2 1/2 1/2    1     Gabapentin neurontin 400mg            1      Gabapentin neurontin 100mg        off    Levothyroxine synthroid levothyroid 75mcg 1                 Lithium eskalith 300mg             1      Melatonin 10mg extended release (10mg B6)            off      Mirabegron myrbetriq 50mg  off                Virabegron gemtesa 75mg  1 prn          Trospium sancturia XR 60mg 24hr   off                Pepto-Bismol        2prn     2prn                                                                                             Plan:  - Continue on your current Carbidopa/Levodopa dose  - Try pushing your Gabapentin later in the day. Taking it around 10pm rather than 8pm in hopes of helping with sleep. If this doesn't solve the problem, can increase to taking Gabapentin 800mg (1 tablet 400mg + 2 tablets 100mg) at night  - Consider using Biotene spray or mouth tape for dry mouth at night  - Continue to work with your psychiatrist and therapist for ongoing management of your anxiety/depression. We'll continue to monitor you on this recent increase in Lithium  - Continue to exercise and stretch as much as you can. Hopefully this will help with your back.  - PT referral for musculoskeletal back pain + parkinson's  - OT referral  - Follow-up in 4mo with Radha Keene

## 2024-10-11 DIAGNOSIS — N39.41 URGE INCONTINENCE OF URINE: Primary | ICD-10-CM

## 2024-10-11 NOTE — TELEPHONE ENCOUNTER
PA Initiation    Medication: OXYBUTYNIN 3.9 MG/24HR TD PTTW  Insurance Company: [employer] - Phone [phone] Fax [phone]  Pharmacy Filling the Rx: [id number] [place], [place] - [address] [place] [address] W  Filling Pharmacy Phone: [phone]  Filling Pharmacy Fax: [phone]  Start Date: 10/11/2024

## 2024-10-11 NOTE — TELEPHONE ENCOUNTER
PRIOR AUTHORIZATION DENIED    Medication: OXYBUTYNIN 3.9 MG/24HR TD PTTW  Insurance Company: [employer] - Phone [phone] Fax [phone]  Denial Date: 10/11/2024  Denial Reason(s):     Appeal Information:     Patient Notified: No, care team must notify

## 2024-10-16 NOTE — TELEPHONE ENCOUNTER
Left message to call back to discuss denied  Leslie Dhillon, RN, BSN  Care Coordinator Urology  [facility] [place]  [facility]  [phone]

## 2024-10-18 ENCOUNTER — TELEPHONE (OUTPATIENT)
Dept: NEUROLOGY | Facility: CLINIC | Age: 64
End: 2024-10-18
Payer: MEDICARE

## 2024-10-18 ENCOUNTER — NURSE TRIAGE (OUTPATIENT)
Dept: NURSING | Facility: CLINIC | Age: 64
End: 2024-10-18
Payer: MEDICARE

## 2024-10-18 NOTE — TELEPHONE ENCOUNTER
Every day at 1:30 PM tremors, stiffness, trouble walking, SOB occurs. This improves with 4:30PM dose. She does not really get dyskinesia's with the 1/2 tablet doses but gets moderate dyskinesia's with the full tablet dose. She eats at 12-12:30PM, she is open to adding a 1/2 tab at 1:30PM vs. Increasing the 11:30AM dose to 1 tab due to dyskinesias however is somewhat worried about lunch interacting. She is not sure if food interferes with her medication.    Medications 7:30am 11:30am 4:30pm 7:30pm 10pm   CD/LD 25-100mg 1 1/2 1/2 1/2 1     She asks about On and OFF time, she learned a little bit but does not understand it - this was discussed.    Plan/recommendation:  I will connect with Dr. Weston and call Fabiana back  Fabiana will schedule an appointment with Yancy Wright in 1-2 months to check in on her medications. [facility] scheduling line: [phone]    13 minute call

## 2024-10-18 NOTE — TELEPHONE ENCOUNTER
Nurse Triage SBAR    Is this a 2nd Level Triage?  No    Situation: Patient notices increased shaking and weakness after 2nd CARBIDOPA/LEVODOPA dose.    Background: History of Parkinsonism. Takes her CARBIDOPA/LEVODOPA  One tab at  7:30 am, 10 pm and 1/2 tab at 11:30 , 4:30,and 7:30 pm  She has noticed andrews shaking and weakness after the second dose. Has happened most of summer. Worse today. Also felt some shortness of breath.   Has not checked any vital signs during episodes.     Assessment: Symptoms after medication dose.     Protocol Recommended Disposition:   Callback by PCP Today    Recommendation: Please call patient at [phone] with recommendations.  She is asking if a dose change is necessary.    Routed to provider/team  [facility] NEUROLOGY ADULT CSC    Kalyn Denise RN  [facility] Central Nursing/Red Flag Triage & Med Refill Team       Reason for Disposition   Caller has NON-URGENT medicine question about med that PCP or specialist prescribed and triager unable to answer question    Additional Information   Negative: Intentional drug overdose and suicidal thoughts or ideas   Negative: Drug overdose and triager unable to answer question   Negative: Caller requesting a renewal or refill of a medicine patient is currently taking   Negative: Caller requesting information unrelated to medicine   Negative: Caller requesting information about COVID-19 Vaccine   Negative: Caller requesting information about Emergency Contraception   Negative: Caller requesting information about Combined Birth Control Pills   Negative: Caller requesting information about Progestin Birth Control Pills   Negative: Caller requesting information about Post-Op pain or medicines   Negative: Caller requesting a prescription antibiotic (such as penicillin) for Strep throat and has a positive culture result   Negative: Caller requesting a prescription anti-viral med (such as Tamiflu) and has influenza (flu) symptoms   Negative: Immunization  "reaction suspected   Negative: Rash while taking a medicine or within 3 days of stopping it   Negative: Asthma and having symptoms of asthma (cough, wheezing, etc.)   Negative: Symptom of illness (e.g., headache, abdominal pain, earache, vomiting) that are more than mild   Negative: Breastfeeding questions about mother's medicines and diet   Negative: MORE THAN A DOUBLE DOSE of a prescription or over-the-counter (OTC) drug   Negative: DOUBLE DOSE (an extra dose or lesser amount) of prescription drug and any symptoms (e.g., dizziness, nausea, pain, sleepiness)   Negative: DOUBLE DOSE (an extra dose or lesser amount) of over-the-counter (OTC) drug and any symptoms (e.g., dizziness, nausea, pain, sleepiness)   Negative: Took another person's prescription drug   Negative: DOUBLE DOSE (an extra dose or lesser amount) of prescription drug and NO symptoms  (Exception: A double dose of antibiotics.)   Negative: Diabetes drug error or overdose (e.g., took wrong type of insulin or took extra dose)   Negative: Caller has medication question about med NOT prescribed by PCP and triager unable to answer question (e.g., compatibility with other med, storage)   Negative: Prescription not at pharmacy and was prescribed by PCP recently  (Exception: triager has access to EMR and prescription is recorded there. Go to Home Care and confirm for pharmacy.)   Negative: Pharmacy calling with prescription question and triager unable to answer question   Negative: Caller has URGENT medicine question about med that PCP or specialist prescribed and triager unable to answer question    Answer Assessment - Initial Assessment Questions  1. NAME of MEDICINE: \"What medicine(s) are you calling about?\"      Carbidopa/Levodopa   2. QUESTION: \"What is your question?\" (e.g., double dose of medicine, side effect)      Wondering if medication is causing symptoms  3. PRESCRIBER: \"Who prescribed the medicine?\" Reason: if prescribed by specialist, call should " "be referred to that group.      Dr Weston  4. SYMPTOMS: \"Do you have any symptoms?\" If Yes, ask: \"What symptoms are you having?\"  \"How bad are the symptoms (e.g., mild, moderate, severe)      Weakness, increased in shaking, short of breath.  5. PREGNANCY:  \"Is there any chance that you are pregnant?\" \"When was your last menstrual period?\"      na    Protocols used: Medication Question Call-A-OH    "

## 2024-10-21 NOTE — TELEPHONE ENCOUNTER
Fabiana will increase the carbidopa-levodopa 11:30 AM dose to 1 tablet. She will schedule with pharmacy but will update us in a week if pharmacy is scheduling out over 3 weeks. She asks if she should increase gabapentin again. She spoke with Dr. Weston about. Psychiatry prescribes it for sleep. She increased to 600 mg last week. Advise to talk with the psychiatrist at her appointment (today) to discuss this.    6 minute phone call

## 2024-11-08 ENCOUNTER — VIRTUAL VISIT (OUTPATIENT)
Dept: PHARMACY | Facility: CLINIC | Age: 64
End: 2024-11-08
Attending: PSYCHIATRY & NEUROLOGY
Payer: MEDICARE

## 2024-11-08 DIAGNOSIS — F33.0 MILD EPISODE OF RECURRENT MAJOR DEPRESSIVE DISORDER (H): ICD-10-CM

## 2024-11-08 DIAGNOSIS — R32 URINARY INCONTINENCE, UNSPECIFIED TYPE: ICD-10-CM

## 2024-11-08 DIAGNOSIS — G20.B2 PARKINSON'S DISEASE WITH DYSKINESIA AND FLUCTUATING MANIFESTATIONS (H): ICD-10-CM

## 2024-11-08 DIAGNOSIS — G20.C PARKINSONISM, UNSPECIFIED PARKINSONISM TYPE (H): Primary | ICD-10-CM

## 2024-11-08 RX ORDER — CHOLECALCIFEROL (VITAMIN D3) 50 MCG
1 TABLET ORAL DAILY
COMMUNITY

## 2024-11-08 RX ORDER — CARBIDOPA AND LEVODOPA 25; 100 MG/1; MG/1
TABLET ORAL
Qty: 315 TABLET | Refills: 3 | Status: SHIPPED | OUTPATIENT
Start: 2024-11-08

## 2024-11-08 RX ORDER — DOXEPIN 3 MG/1
3 TABLET, FILM COATED ORAL AT BEDTIME
COMMUNITY
Start: 2024-11-04

## 2024-11-08 RX ORDER — GABAPENTIN 300 MG/1
600 CAPSULE ORAL AT BEDTIME
COMMUNITY
Start: 2024-10-24

## 2024-11-08 NOTE — Clinical Note
Dyskinesia is improved with reducing the carbidopa/levodopa dose a bit. I did place a health psychology referral for her.

## 2024-11-08 NOTE — PATIENT INSTRUCTIONS
"Recommendations from today's MTM visit:                                                       -Continue current medication.    -Call the scheduling number to see about getting a new primary care provider closer to home ([phone])  -I placed a referral for health psychology. Expect a call from them or you can call them at [phone]     Here is some information about CBT-I  Treating insomnia can be difficult because the medications we use can be harmful, especially in older adults. In addition, sleep medications do not tend to be very effective and should only be used short-term. Cognitive behavioral therapy (CBT) is the most effective treatment for long-term insomnia. The goal of CBT for insomnia is to address the underlying causes of the sleep problems, including your habits and how you think about sleep. You can do CBT with a trained psychologist, or from the comfort of your home by following an online program or workbook. Here are some great resources for at-home CBT:    1) 6-week online CBT course through [facility] for $40: [employer]/sleep  2) \"Say Briseyda to Insomnia\" by Angelo Reynoso, PhD at [employer]: 6-week program  3) \"Overcoming Insomnia: A Cognitive-Behavioral Therapy Approach Workbook\" by Singh Zhou and Anika Fonseca  4) \"Quiet Your Mind and Get to Sleep: Solutions to Insomnia for Those with Depression, Anxiety or Chronic Pain ([place] Self-Help Workbook) by Anika Fonseca and Joana Carlton    Follow-up: 12/20/24 at 1:00pm    It was great speaking with you today.  I value your experience and would be very thankful for your time in providing feedback in our clinic survey. In the next few days, you may receive an email or text message from [email or web address] with a link to a survey related to your  clinical pharmacist.\"     To schedule another MTM appointment, please call the clinic directly or you may call the MTM scheduling line at " [phone].    My Clinical Pharmacist's contact information:                                                      Please feel free to contact me with any questions or concerns you have.      Yancy Wright, Royal  Medication Therapy Management Pharmacist  [facility] Psychiatry and Neurology [facility]

## 2024-11-08 NOTE — PROGRESS NOTES
Medication Therapy Management (MTM) Encounter    ASSESSMENT:                            Medication Adherence/Access: No issues identified.    Parkinsonism:   With improvement in the morning dyskinesia by reducing the 11:30am dose slightly, will plan to continue current regimen for now. Neurology follow up in about 3 months.    Urinary incontinence:   If affordable, suggested that she get refill for Gemtesa just before the year ends. Discussed that transdermal oxybutynin does have generally less side effects of constipation and dry mouth, though could still contribute to cognitive changes. She could try it cautiously if Gemtesa is no longer affordable.    Depression:   Recently added doxepin has already provided benefit for sleep maintenance, though she has only taken two doses. Will give that more time and she will continue regular follow up with psychiatry provider. Considering moving that care to [place], but will continue with current provider for now.   She is not taking much for antidepressant and I do wonder if lithium is still providing her benefit. Bupropion could be increased to offer some benefit for mood, motivation.  Discussed health psychology and she would like a referral.  Discussed CBT-I has a very long wait and may be reasonable to try to some home options first. Provided resources.    PLAN:                            Continue current medication.  -Call the scheduling number to see about getting a new primary care provider closer to home ([phone])  -I placed a referral for health psychology. Expect a call from them or you can call them at [phone]     Could consider increasing bupropion to help with depression--talk with psychiatry  You can provide the Neurology clinic phone number to your provider at [facility]-- [phone]    Follow-up: 12/20/24    SUBJECTIVE/OBJECTIVE:                          Fabiana Hu is a 64 year old female seen for a follow-up visit. Patient was  accompanied by .    Patient would like to move all her care to [facility]  Psychiatry: [facility], Deepak ReeceCasey      Reason for visit: med check.    Allergies/ADRs: Reviewed in chart  Past Medical History: Reviewed in chart  Tobacco: She reports that she has never smoked. She has never used smokeless tobacco.  Alcohol: not currently using    Medication Adherence/Access: no issues reported.    Parkinsonism:   Medication Dose Timing    7:30am 11:30am 4:30pm 7:30pm 8-9pm 10pm   Carbidopa/levodopa 25/100 1 0.5 0.5 0.5   1   Gabapentin 600mg         1        At last visit, discussed going back down on the 11:30am carbidopa/levodopa dose from 1 to 0.5 tablets to see if there is any change in weakness, which had persisted since returning from international trip in May/June.   Since that time, she has sent [employer] message indicating dyskinesia in her left leg that was occurring soon after 11:30am dose and lasting for a few hours.   Today, she reported that the dyskinesia from the video was after taking 1 full tablet and she has since reduced the 11:30am, finding notable improvement in the leg movement. Feels that motor symptoms are generally managed ok with this regimen    Her sister has been talking with her recently about getting all of her care in one system in order to allow care team to work together more easily.    Urinary incontinence:   -Gemtesa 75mg daily    Reports that things are going well with the medication, but it is expensive. Recently prescribed oxybutynin patch, but hasn't gotten it from the pharmacy. She is due for a refill at the end of December.       Depression:   -bupropion ER 150mg daily  -lithium 600mg daily  -doxepin 3mg at bedtime    She has been feeling more depressed lately, especially with worsened PD symptoms lately. Feeling sad about things she was hoping to do that feel much harder now. Had a very challenging summer, with prolonged symptoms and weakness after a  international trip. She is seeing a therapist, but would like more robust therapy. Also has psych APRN that she has been seeing regularly.   Doxepin was recently prescribed by psychiatry to help sleep, thinking that lack of quality sleep could be contributing to depression. She had been falling asleep without issue, but waking at 1-2am and unable to get back to sleep. She denied any movement/physical concerns or busy thoughts during the night. Doxepin has been really helpful for staying asleep on the two nights she has taken it thus far.  Stated that psychiatry provider also suggested that she request a CBT-I referral.    She has been on current bupropion dose for about a year and half and on lithium for much longer. Previous notes indicate that last depressive episode  into  and previous had been 10 years prior. Lithium has helped her brother's depression, so they tried it as well.     ----------------      I spent 45 minutes with this patient today. All changes were made via collaborative practice agreement with Ramon Weston. A copy of the visit note was provided to the patient's provider(s).    A summary of these recommendations was sent via clinic portal.    Yancy Wright, Royal  Medication Therapy Management Pharmacist  [facility] Psychiatry and Neurology Clinics      Telemedicine Visit Details  The patient's medications can be safely assessed via a telemedicine encounter.  Type of service:  Video Conference via [employer]  Originating Location (pt. Location): Home    Distant Location (provider location):  Off-site  Start Time:  11:00am  End Time:  11:45am     Medication Therapy Recommendations  Parkinson's disease (H)   1 Current Medication: carbidopa-levodopa (SINEMET)  MG tablet   Current Medication Si tab @ 7:30am, 1/2 tab at 11:30am, 4:30pm and 7:30pm, 1 tab @10pm   Rationale: Dose too high - Dosage too high - Safety   Recommendation: Decrease Dose - per plan   Status: Accepted  per CPA   Identified Date: 11/8/2024 Completed Date: 11/8/2024

## 2024-11-14 ENCOUNTER — TELEPHONE (OUTPATIENT)
Dept: FAMILY MEDICINE | Facility: CLINIC | Age: 64
End: 2024-11-14
Payer: MEDICARE

## 2024-11-14 NOTE — TELEPHONE ENCOUNTER
Left Voicemail (1st Attempt) and Sent Mychart (1st Attempt) for the patient to call back and schedule the following:    Appointment type: [facility] NEW  Provider: Dr. Zuniga   Return date: 12/26/2024  Specialty phone number: [phone]  Additional appointment(s) needed: -  Additonal Notes: Due to a change in providers schedule Dr. Zuniga is not available on December 26th please call to reschedule your appointment

## 2024-11-15 ENCOUNTER — THERAPY VISIT (OUTPATIENT)
Dept: OCCUPATIONAL THERAPY | Facility: REHABILITATION | Age: 64
End: 2024-11-15
Attending: PSYCHIATRY & NEUROLOGY
Payer: MEDICARE

## 2024-11-15 DIAGNOSIS — R25.1 TREMOR: ICD-10-CM

## 2024-11-15 DIAGNOSIS — R41.3 MEMORY CHANGES: Primary | ICD-10-CM

## 2024-11-15 DIAGNOSIS — R29.898 WEAKNESS OF BOTH HANDS: ICD-10-CM

## 2024-11-15 DIAGNOSIS — G20.A1 PARKINSON'S DISEASE, UNSPECIFIED WHETHER DYSKINESIA PRESENT, UNSPECIFIED WHETHER MANIFESTATIONS FLUCTUATE (H): ICD-10-CM

## 2024-11-15 NOTE — PROGRESS NOTES
OCCUPATIONAL THERAPY EVALUATION  Type of Visit: Evaluation       Fall Risk Screen:  Fall screen completed by: OT  Have you fallen 2 or more times in the past year?: No  Have you fallen and had an injury in the past year?: No  Is patient a fall risk?: No    Subjective        Presenting condition or subjective complaint: Patient referred to OT by Neurologist due to Parkinson's disease. Patient reports that she is also struggling with depression and sleep. She had neuropsych evaluation (2023) and scores were mostly in the normal range. She feels that her memory has declined. She has tremor and weakness in left UE only and states it does impact her independence.    Date of onset: 10/10/24 (date of referral to OT)    Relevant medical history:     Past Medical History:   Diagnosis Date    Parkinson's disease (H)     Urinary incontinence 05/07/2023    urge incontinence     Dates & types of surgery:   See EMR    Prior diagnostic imaging/testing results:      See EMR  Prior therapy history for the same diagnosis, illness or injury:     Had one speech therapy at [place] and was discharged. LSVT at [facility] in 2022    Prior Level of Function  Transfers: Independent  Ambulation: Independent  ADL: Independent  IADL: independent with most IADL's    Living Environment  Social support:     Type of home: (Patient-Rptd) House , 2 story home with   Stairs to enter the home:         Ramp: (Patient-Rptd) No   Stairs inside the home: (Patient-Rptd) Yes       Help at home:    Equipment owned:       Employment:      Hobbies/Interests:      Patient goals for therapy:      Pain assessment: Pain denied     Objective     Cognitive Status Examination  Orientation: Oriented to person, place and time   Level of Consciousness: Alert  Follows Commands and Answers Questions: 100% of the time  Personal Safety and Judgement: Intact  Memory: Impaired  Attention: No deficits identified  Organization/Problem Solving: No deficits  identified  Executive Function: Working memory impaired, decreased storage of information for performing tasks    VISUAL SKILLS  Visual Acuity: Wears glasses  Visual Field: Appears normal  Visual Attention: Appears normal  Oculomotor: Normal tracking, undershoots on saccades in all directions    SENSATION: Tingling in both arms. WNL sensation in feet    RANGE OF MOTION: UE AROM WNL  STRENGTH:   Pain: - none + mild ++ moderate +++ severe  Strength Scale: 0-5/5 Left Right    Strength (lbs) 61# 67#   Lateral Pinch (lbs) 13# 13#   3 Point Pinch (lbs) 13# 16#        Hand Dominance: Right  COORDINATION: will test next visit  BALANCE: WFL    FUNCTIONAL MOBILITY  Assistive Device(s): None  Ambulation: Independent    BED MOBILITY: Independent    TRANSFERS: Independent    BATHING: Independent  Equipment: Grab bar    UPPER BODY DRESSING: Independent however slower to complete  Equipment: none    LOWER BODY DRESSING: Independent however slower to complete  Equipment: none    TOILETING: Independent  Equipment: none    GROOMING: Independent  Equipment: none    EATING/SELF FEEDING: Independent   Equipment: none    ACTIVITY TOLERANCE: Good    INSTRUMENTAL ACTIVITIES OF DAILY LIVING (IADL):   Meal Planning/Prep: Patient is independent   Home management: Patient is independent   Financial Management:  has always managed the finances  Communication/Computer Use: Independent but very difficult  Community Mobility: Patient drives only short distances  Care of Others: N/A    MFIS Physical Subscale Score: 15/36- (Higher scores indicate a greater impact of fatigue on patient activities)  MFIS Cognitive Subscale Score: 18/40 (Higher scores indicate a greater impact of fatigue on patient activities)  MFIS Psychosocial Subscale Score: 4/8 (Higher scores indicate a greater impact of fatigue on patient activities)  Modified Fatigue Impact Scale (MFIS) Total  Score: 37/84 (Higher scores indicate a greater impact of fatigue on patient  activities)        Assessment & Plan   CLINICAL IMPRESSIONS  Medical Diagnosis: Parkinson's disease, unspecified whether dyskinesia present, unspecified whether manifestations fluctuate    Treatment Diagnosis: tremor, hand weakness, memory changes, fatigue    Impression/Assessment: Pt is a 64 year old female presenting to Occupational Therapy due to Parkinsons disease.  The following significant findings have been identified: Impaired activity tolerance, Impaired balance, Impaired coordination, and Impaired strength.  These identified deficits interfere with their ability to perform self care tasks, household chores, driving , and meal planning and preparation as compared to previous level of function.     Clinical Decision Making (Complexity):  Assessment of Occupational Performance: 3-5 Performance Deficits  Occupational Performance Limitations: functional mobility, driving and community mobility, home establishment and management, sleep, and leisure activities  Clinical Decision Making (Complexity): Low complexity    PLAN OF CARE  Treatment Interventions:  Interventions: Self-Care/Home Management, Therapeutic Activity, Therapeutic Exercise    Long Term Goals   OT Goal 1  Goal Identifier: memory  Goal Description: Pt will verbalize understanding about strategies to help with improving attention (concentration) and internal and external strategies to help with memory and recall into daily routine for improved ADL/IADL performance.  Target Date: 02/07/25  OT Goal 2  Goal Identifier: tremor  Goal Description: Patient will verbalize and utilize 3 strategies and/or AE to manage and compensate for B UE tremors for increased ease and independence with ADL/IADLs (handwriting , eating, dressing, keyboarding, etc.)  Target Date: 02/07/25  OT Goal 3  Goal Identifier: pinch strength  Goal Description: Patient will demonstrate increased B pinch strength (both lateral and palmar) by 3# each for increased independence with  ADL/IADLs such as opening containers, pull up pants, maintaining pinch to hold items, etc  Target Date: 02/07/25  OT Goal 4  Goal Identifier: fatigue  Goal Description: Patient to state 3 strategies for energy conservation/work simplification and fatigue management for improved independence with ADL/IADLs at home as evidenced by 10+ reduction on MFIS  Target Date: 02/07/25      Frequency of Treatment: once a week  Duration of Treatment: 12 weeks     Recommended Referrals to Other Professionals: no  Education Assessment: Learner/Method: Patient     Risks and benefits of evaluation/treatment have been explained.   Patient agrees with Plan of Care.     Evaluation Time:    OT Naun Low Complexity Minutes (41185): 30       Signing Clinician: Jennie Kumar OT        [facility]                                                                                   OUTPATIENT OCCUPATIONAL THERAPY      PLAN OF TREATMENT FOR OUTPATIENT REHABILITATION   Patient's Last Name, First Name, Fabiana Painter YOB: 1960   Provider's Name   [facility]   Medical Record No.  [id number]     Onset Date: 10/10/24 (date of referral to OT) Start of Care Date: 11/15/24     Medical Diagnosis:  Parkinson's disease, unspecified whether dyskinesia present, unspecified whether manifestations fluctuate      OT Treatment Diagnosis:  tremor, hand weakness, memory changes, fatigue Plan of Treatment  Frequency/Duration:once a week/12 weeks    Certification date from 11/15/24   To 02/07/25        See note for plan of treatment details and functional goals     Jennie Kumar OT                         I CERTIFY THE NEED FOR THESE SERVICES FURNISHED UNDER        THIS PLAN OF TREATMENT AND WHILE UNDER MY CARE     (Physician attestation of this document indicates review and certification of the therapy plan).              Referring Provider:  Ramon Weston MD    Initial  Assessment  See Epic Evaluation- 11/15/24

## 2024-11-19 ENCOUNTER — TRANSFERRED RECORDS (OUTPATIENT)
Dept: HEALTH INFORMATION MANAGEMENT | Facility: CLINIC | Age: 64
End: 2024-11-19
Payer: MEDICARE

## 2024-11-20 ENCOUNTER — THERAPY VISIT (OUTPATIENT)
Dept: PHYSICAL THERAPY | Facility: REHABILITATION | Age: 64
End: 2024-11-20
Attending: PSYCHIATRY & NEUROLOGY
Payer: MEDICARE

## 2024-11-20 ENCOUNTER — THERAPY VISIT (OUTPATIENT)
Dept: OCCUPATIONAL THERAPY | Facility: REHABILITATION | Age: 64
End: 2024-11-20
Attending: PSYCHIATRY & NEUROLOGY
Payer: MEDICARE

## 2024-11-20 DIAGNOSIS — R41.3 MEMORY CHANGES: Primary | ICD-10-CM

## 2024-11-20 DIAGNOSIS — R25.8 BRADYKINESIA: ICD-10-CM

## 2024-11-20 DIAGNOSIS — R29.898 WEAKNESS OF BOTH HANDS: ICD-10-CM

## 2024-11-20 DIAGNOSIS — R26.9 ABNORMALITY OF GAIT: Primary | ICD-10-CM

## 2024-11-20 DIAGNOSIS — G20.A1 PARKINSON'S DISEASE, UNSPECIFIED WHETHER DYSKINESIA PRESENT, UNSPECIFIED WHETHER MANIFESTATIONS FLUCTUATE (H): ICD-10-CM

## 2024-11-20 DIAGNOSIS — R25.1 TREMOR: ICD-10-CM

## 2024-11-20 NOTE — PROGRESS NOTES
"PHYSICAL THERAPY EVALUATION  Type of Visit: Evaluation    Subjective         Presenting condition or subjective complaint: (Patient-Rptd) ot for parkinsons    Diagnosis: Parkinson's Disease  Date of diagnosis: 2022  Patient's Neurologist: Dr. Ramon Weston Date of last Neurologist visit: October 2024   First movement disorder symptom presentation and Date: Shuffling of gait and \"rigid in bed\", 2022  Non-motor symptoms: Loss of smell (years ago), Early mild cognitive impairment, Depression, Anxiety, Urinary symptoms, Constipation, Sleep disturbances, Fatigue and Restless Leg Syndrome.   Time of last PD med: 0730 Time of next PD med: 1130. On/Off presentation/symptoms: \"I can't tell for sure\".   History of PD specific PT? Yes, 2023 at [facility]  Current Activity Level: Goes to exercise class on Mondays and tries to do the virtual classes on Wed and Fri; tries to do the BIG exercises when she cans.   Chief Mobility Complaint: Getting in/out of car, worsening posture (especially for meals), worsening endurance, worsening balance. Denies any falls.   Freezing: Denies    Date of onset: 10/10/24    Relevant medical history:     Dates & types of surgery:      Prior diagnostic imaging/testing results:       Prior therapy history for the same diagnosis, illness or injury:        Prior Level of Function  Transfers: Independent  Ambulation: Independent    Living Environment  Social support:     Type of home: (Patient-Rptd) House   Stairs to enter the home:         Ramp: (Patient-Rptd) No   Stairs inside the home: (Patient-Rptd) Yes       Help at home:    Equipment owned:       Employment:      Hobbies/Interests:      Patient goals for therapy:      Pain assessment: Pain denied     Objective     Cognitive Status Examination  Orientation: Oriented to person, place and time   Level of Consciousness: Alert  Follows Commands and Answers Questions: 100% of the time  Personal Safety and Judgement: Intact  Memory: " Intact    OBSERVATION  Bradykinesia and Hypokinesia (Combining slowness, hesitancy, decreased arm swing, small amplitude and poverty of movement in general):  Minimal  Dyskinesia: Yes B LE, L>R     Posture: Flexed forward posture, increasing thoracic kyphosis   Rigidity: Present    RANGE OF MOTION: LE ROM WNL  STRENGTH: LE Strength WNL  SENSATION: LE Sensation WNL    BED MOBILITY: Independent    TRANSFERS: Independent    GAIT ANALYSIS: Shuffles gait, mild path deviations that appear mostly related to her dyskinesias    BALANCE: See below    SPECIAL TESTS    360 degree turn (steps) 6 Clockwise  5 Counter Clockwise          With no AD    Timed Up and Go (TUG) 10.01 sec  With no AD Increased risk for falls with standard TUG >11.5secs for people with PD, >13.5secs for Community Dwelling Elders.   Cognitive TUG  12.21 sec    Task: counting from 50 to 35 by 3's correctly     5 Times Sit-to-Stand (5TSTS) - sec  10.88 Increased risk for falls 5x STS >16 secs for people with PD, >15 secs for Community Dwelling Elders.  5x STS Normative Values by Age Category (Healthy Population)( Age in years (n) Mean   SD): Ages 60-69: 7.8   2.4 sec    30 sec Chair Stand: 13 reps  Comments: with arms crossed Age/Gender Norm: Female 65-69: 15   Mini BESTest: TOTAL SCORE (out of 28): 19 <22/28 indicates fall risk; See full MiniBEST for details.   10 Meter Walk Test (Comfortable):  6m in 4.66 sec: 1.28 m/s  # of steps: 10 Age/Gender Norm (meters/second): Women in their 60s: 1.241    10 Meter Walk Test (Fast):  6m in 3.82 sec: 1.57 m/s  # of steps: 9    6 Minute Walk Test (6MWT)   NT    Age/Gender Norm: Female 60-69 yrs: 538 m/1765.09ft    Functional Gait Assessment (FGA):     <22/30 indicates fall risk in community dwelling elder, PD <18/30       COORDINATION/MOTOR PLANNING: NT  Four Square Step Test     Assessment & Plan   CLINICAL IMPRESSIONS  Medical Diagnosis: Parkinson's disease, unspecified whether dyskinesia present, unspecified whether  manifestations fluctuate    Treatment Diagnosis: Postural Instability, Impaired gait, LE weakness   Impression/Assessment: Pt is a 65 yo female who was diagnosed with Parkinson's Disease (PD) in 2022.  The pt has a previous history with PD specific skilled physical therapy last year with Riki Cordoba.   The pt comes to physical therapy with the chief complaints of worsening balance and greater difficulty getting out of a car. Today's PT eval reveals the pt is below age/gender norms for functional LE strength and presents at an elevated fall risk from the balance assessment findings of 19/28 on the miniBEST.  Patient would benefit from skilled 1:1 PT to address deficits, decrease falls risk, and optimize function due to progressive nature of disease.       Clinical Decision Making (Complexity):  Clinical Presentation: Stable/Uncomplicated  Clinical Presentation Rationale: based on medical and personal factors listed in PT evaluation  Clinical Decision Making (Complexity): Low complexity    PLAN OF CARE  Treatment Interventions:  Interventions: Gait Training, Neuromuscular Re-education, Therapeutic Activity, Therapeutic Exercise, Self-Care/Home Management    Long Term Goals     PT Goal 1  Goal Description: Pt will be independent with her HEP for ongoing symptom management in 12 weeks.  PT Goal 2  Goal Description: Pt will improve LE functional strength from 13 stands to 15 stands in 12 weeks.  PT Goal 3  Goal Description: Pt will improve functional balance from 19/28 on the miniBEST to 24/28 on the mini BEST in 12 weeks.      Frequency of Treatment: 2x/wk  Duration of Treatment: 13 visits over 12 weeks    Recommended Referrals to Other Professionals: Occupational Therapy, Speech Language Pathology  Education Assessment:   Learner/Method: Patient  Education Comments: POC    Risks and benefits of evaluation/treatment have been explained.   Patient/Family/caregiver agrees with Plan of Care.     Evaluation Time:           Signing Clinician: Tory French, VICTORIA        [facility]                                                                                   OUTPATIENT PHYSICAL THERAPY      PLAN OF TREATMENT FOR OUTPATIENT REHABILITATION   Patient's Last Name, First Name, Fabiana Painter YOB: 1960   Provider's Name   [facility]   Medical Record No.  [id number]     Onset Date: 10/10/24  Start of Care Date: 11/20/24     Medical Diagnosis:  Parkinson's disease, unspecified whether dyskinesia present, unspecified whether manifestations fluctuate      PT Treatment Diagnosis:  Postural Instability, Impaired gait, LE weakness Plan of Treatment  Frequency/Duration: 2x/wk/ 13 visits over 12 weeks    Certification date from 11/20/24 to 02/12/25         See note for plan of treatment details and functional goals     Tory French, PT                         I CERTIFY THE NEED FOR THESE SERVICES FURNISHED UNDER        THIS PLAN OF TREATMENT AND WHILE UNDER MY CARE     (Physician attestation of this document indicates review and certification of the therapy plan).              Referring Provider:  Ramon Weston    Initial Assessment  See Epic Evaluation- Start of Care Date: 11/20/24

## 2024-11-27 ENCOUNTER — THERAPY VISIT (OUTPATIENT)
Dept: OCCUPATIONAL THERAPY | Facility: REHABILITATION | Age: 64
End: 2024-11-27
Attending: PSYCHIATRY & NEUROLOGY
Payer: MEDICARE

## 2024-11-27 DIAGNOSIS — R29.898 WEAKNESS OF BOTH HANDS: ICD-10-CM

## 2024-11-27 DIAGNOSIS — R41.3 MEMORY CHANGES: Primary | ICD-10-CM

## 2024-11-27 DIAGNOSIS — R25.1 TREMOR: ICD-10-CM

## 2024-11-27 DIAGNOSIS — G20.A1 PARKINSON'S DISEASE, UNSPECIFIED WHETHER DYSKINESIA PRESENT, UNSPECIFIED WHETHER MANIFESTATIONS FLUCTUATE (H): ICD-10-CM

## 2024-12-03 ENCOUNTER — MYC MEDICAL ADVICE (OUTPATIENT)
Dept: NEUROLOGY | Facility: CLINIC | Age: 64
End: 2024-12-03
Payer: MEDICARE

## 2024-12-03 DIAGNOSIS — G20.B2 PARKINSON'S DISEASE WITH DYSKINESIA AND FLUCTUATING MANIFESTATIONS (H): ICD-10-CM

## 2024-12-03 DIAGNOSIS — G47.00 INSOMNIA: Primary | ICD-10-CM

## 2024-12-09 ENCOUNTER — THERAPY VISIT (OUTPATIENT)
Dept: OCCUPATIONAL THERAPY | Facility: REHABILITATION | Age: 64
End: 2024-12-09
Payer: MEDICARE

## 2024-12-09 DIAGNOSIS — R25.1 TREMOR: ICD-10-CM

## 2024-12-09 DIAGNOSIS — R29.898 WEAKNESS OF BOTH HANDS: ICD-10-CM

## 2024-12-09 DIAGNOSIS — G20.A1 PARKINSON'S DISEASE, UNSPECIFIED WHETHER DYSKINESIA PRESENT, UNSPECIFIED WHETHER MANIFESTATIONS FLUCTUATE (H): ICD-10-CM

## 2024-12-09 DIAGNOSIS — R41.3 MEMORY CHANGES: Primary | ICD-10-CM

## 2024-12-09 PROCEDURE — 97535 SELF CARE MNGMENT TRAINING: CPT | Mod: GO | Performed by: OCCUPATIONAL THERAPIST

## 2025-01-02 ENCOUNTER — TRANSFERRED RECORDS (OUTPATIENT)
Dept: HEALTH INFORMATION MANAGEMENT | Facility: CLINIC | Age: 65
End: 2025-01-02

## 2025-01-02 ENCOUNTER — TELEPHONE (OUTPATIENT)
Dept: BEHAVIORAL HEALTH | Facility: CLINIC | Age: 65
End: 2025-01-02
Payer: MEDICARE

## 2025-01-02 NOTE — TELEPHONE ENCOUNTER
**Patient Navigator Follow Up**    1/2/2025;    I called and left  for patient to have Kaitlynn fax over her MH DA to us for Clinical Review and I provided our Dept fax#:[phone]    I provided my direct call back phone# as if she has further questions.      Thank you,    Lisa TRACY  Patient Navigator

## 2025-01-02 NOTE — TELEPHONE ENCOUNTER
Pt and spouse called, spouse stated they are wanting her to join the 55+ IOP and that they have a referral from [facility]. Writer explained that I they already have an external referral they may be able to skip the DA, transferred to navigators phone line.

## 2025-01-06 ENCOUNTER — THERAPY VISIT (OUTPATIENT)
Dept: PHYSICAL THERAPY | Facility: REHABILITATION | Age: 65
End: 2025-01-06
Payer: MEDICARE

## 2025-01-06 DIAGNOSIS — R25.8 BRADYKINESIA: ICD-10-CM

## 2025-01-06 DIAGNOSIS — G20.A1 PARKINSON'S DISEASE, UNSPECIFIED WHETHER DYSKINESIA PRESENT, UNSPECIFIED WHETHER MANIFESTATIONS FLUCTUATE (H): Primary | ICD-10-CM

## 2025-01-06 DIAGNOSIS — R26.9 ABNORMALITY OF GAIT: ICD-10-CM

## 2025-01-06 PROCEDURE — 97112 NEUROMUSCULAR REEDUCATION: CPT | Mod: GP | Performed by: PHYSICAL THERAPIST

## 2025-01-06 PROCEDURE — 97110 THERAPEUTIC EXERCISES: CPT | Mod: GP | Performed by: PHYSICAL THERAPIST

## 2025-01-07 ENCOUNTER — OFFICE VISIT (OUTPATIENT)
Dept: PSYCHIATRY | Facility: CLINIC | Age: 65
End: 2025-01-07
Attending: PSYCHOLOGIST
Payer: MEDICARE

## 2025-01-07 ENCOUNTER — TRANSFERRED RECORDS (OUTPATIENT)
Dept: HEALTH INFORMATION MANAGEMENT | Facility: CLINIC | Age: 65
End: 2025-01-07
Payer: MEDICARE

## 2025-01-07 DIAGNOSIS — F99 MENTAL HEALTH DISORDER: Primary | ICD-10-CM

## 2025-01-07 ASSESSMENT — PATIENT HEALTH QUESTIONNAIRE - PHQ9
SUM OF ALL RESPONSES TO PHQ QUESTIONS 1-9: 13
10. IF YOU CHECKED OFF ANY PROBLEMS, HOW DIFFICULT HAVE THESE PROBLEMS MADE IT FOR YOU TO DO YOUR WORK, TAKE CARE OF THINGS AT HOME, OR GET ALONG WITH OTHER PEOPLE: SOMEWHAT DIFFICULT
SUM OF ALL RESPONSES TO PHQ QUESTIONS 1-9: 13

## 2025-01-07 NOTE — PROGRESS NOTES
"Department of Psychiatry   [facility]  Non-Medical Diagnostic Evaluation    Visit 2 of 2    Date of Service: Jan 14, 2025  Time of interview with patient: Start Time: 10:30 am Stop Time: 12 pm  Care Provider: Kathy Garcia M.S., PsyD practicum student  Supervisor: Denia Bowman PsyD, ONEYDA  Psychiatrist: Outside provider   Referral: Yancy Wright, [place]    Identifying Data:  Fabiana Hu is a 64 year old female     Sources of Information: gathered by clinical interview, MINI, and chart review.     Diagnostic history: Per chart review on 5/3/2022, Major Depressive Disorder, Severe, with psychotic features    CHIEF COMPLAINT     \"My depression, I want it to go away\"      HISTORY OF PRESENT ILLNESS        - Fabiana has a history of depressive episodes since 1984, though episode frequency, duration, and intensity began worsening in 2021/2022  - Her most recent previous depressive episode occurred in 2021/2022 and lasted for approximately 6-8 months  - Current depressive episode has been ongoing since approximately September 2024  - Symptoms include loss of motivation, isolation anhedonia, difficulty sleeping, lack of energy, and feeling down/depressed/hopeless  - She was diagnosed with Parkinson's Disease (PD) in 2022  - Has felt significant loss associated with PD, including loss of functioning and goals for future such as traveling and volunteering   - Experiences significant worry regarding PD diagnosis, including loss of functioning and social life, she stated \"I am afraid to be social because I think people will know about my condition\"  - Reports thoughts such as \"I cannot do stuff\" \"I have difficulty with friends\" \"I am losing my physical ability\" and \"I assume that people won't do stuff like call me\"  - Has attempted support group for PD and reports difficulty, stating \"They all seem to do better than me, they don't have the depression\"  - Expresses interest in CBT \"to change my thoughts so I no longer have " "depression\"  - Attends Buddhism weekly though has decreased attendance because  cannot always attend  - Has tried many medications for depression with little to no benefit  - Appetite: good, no concerns, 3 nutritional meals per day.     PSYCHIATRIC AND DIAGNOSTIC INTERVIEW     The MINI International Neuropsychiatric Interview was completed to aid in diagnostic assessment.    Depression: Since September 2024,  reports anhedonia, depressed mood/feeling hopeless/excessive crying, insomnia/difficulties with sleep, low energy, feeling bad about self/worthless/excessive guilt, poor concentration/memory/indecisiveness, and passive suicide ideation (\"it would be easier\"). Patient denied suicide plan and intent.    Sleep: Reports difficulty sleeping through the night. She is in bed by 10 pm and often wakes at 3 am for the rest of the day, practices sleep hygiene and works with a sleep doctor with minimal improvement. Experiences fatigue due to sleep problems.     Eating Disorder: Denies.    History of Depression (prior episodes): Patient recalls first feeling depressed in 1984. She reports the frequency of one episode about every 10 years. However, most recent past episode occurred in 2021/2022 and reports she is currently experiencing an episode since September 2024. Reports past episode durations would only last a few weeks, though past two episodes have lasted 7-8 months. Reports increase in intensity.     Tammy/hypomania: Denies.    Panic: Denies.    Agoraphobia: Sometimes due depression and anxiety, \"I won't have anything to say or have anything to do\". Reports she is concerned about what others might think and is afraid of being embarrassed so she prefers to avoid leaving the house.     Social anxiety: Anxiety in social situations due to fears of judgement and afraid of not being able to maintain a conversation. She reports onset of social anxiety is related to PD diagnosis and depression, is afraid of what others " "might think. Social situations are avoided or endured with great fear.    Obsessions: Denies.    Compulsions: Denies.    Trauma history: Denies trauma history.    Generalized Anxiety: Excessive worry about several routine things, anxieties and worries present most days, difficulty controlling worries. During times of anxiety, pt endorsed feeling restless/ on edge, muscle tension, tired/ easily exhausted, mind going blank, irritability, insomnia. Comes with depression, feels more severe during current episode.     Psychosis: Denies.    Substance use history:  Denies significant substance use history.     Alcohol:   First Regular Use: unsure.  Pattern of Use: A glass of wine every 2-3 weeks.  Date of Last Use: unsure.    Tobacco (Smoking/Vaping): \"None\"  First Regular Use: None  Pattern of Use: None  Date of Last Use: None    Cannabis: Denies.      Other Illicit Drugs: Denies.    Attention Deficit Hyperactivity Disorder: Denies.    Antisocial Personality: None    SAFETY ASSESSMENT     Suicide:  Level of immediate risk: None  Ideation/Plan/Intent: Passive SI due to physical condition, \"it would be easier\". Discussed safety planning such as indicators for higher level of care with increased intensity or frequency. She denies any suicide plan or intent.    Risk Factors: Fabiana Hu has the following risk factors for self-harm new/ worsening medical issue   Deterrents: Risk is mitigated by no h/o suicide attempt, no plan or intent, no h/o risky impulsive behavior, no access to lethal means, describes a safety plan, h/o seeking help when needed, future oriented, none to minimal alcohol use , commitment to family, good social support  , Adventism beliefs, and stable housing.  Self-injurious Behaviors [method, most recent]: Denies.   Suicide Attempt [#, recent, method]: Denies.    Homicide/Violence:  Assessed level of immediate risk: None  Denies ideation, plan, and intent. Denies.     PSYCHIATRIC AND SUBSTANCE USE TREATMENT " "HISTORY     Current psychiatric medications: Lithium 600mg and Seroquel (unsure dose).     Current major medical issues: Diagnosed with Parkinson's disease in 2022.    Psychiatric treatment history:   Psych Inpatient Hospitalizations: None  ECT: None  Outpatient Programs: Support groups; denies other outpatient programs.   Individual Therapy: [facility] (over 1 year); [facility] (2-3x in the past year).    Substance use treatment history: None    SOCIAL AND FAMILY HISTORY     Current Living Situation/Family Relationships: Lives with  in home that they own.   Guns at home?: No.  Leisure time and interests: Going to movies, walking outside, traveling, reading.   Exercise:  Exercise class for Parkinson's 1x per week, walks.    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  Cultural/ Social/ Spiritual/ Quaker Support: Moved to [place] in 1984; her and  moved here due to work.   Legal History: None.     Upbringing: Fabiana Hu was born and raised - together with 3 brothers and 1 sister - by both parents in [place].   Development: met developmental milestones on time.  Cultural influences: Fabiana identifies as White. She reports \"None\" to cultural considerations to take into account when providing treatment. Born and raised in [place], moved to [place] in 1984 after meeting  in [place].   Life Events/Stressors: Recent diagnosis of PD. Moved to [place] in 1989 - first depressive episode.   Head injuries: None.  Academic (e.g., problems in school, learning difficulties, highest education): Bachelor's degree, good in school, no problems in school - some friend issues but nothing significant.     Family psychiatric history: Brother (depression), Mom (depression), Daughter (bipolar); extended family has history of depression.    MENTAL STATUS EXAM                   "                                                                     Alertness: alert  and oriented  Appearance: well groomed  Behavior/Demeanor: cooperative, with fair  eye contact   Speech: slowed Intact. Normal volume, merly. No prosody.  Language: intact. Preferred language identified as English.  Psychomotor: dystonia  Mood: depressed  Affect: flat; was congruent to mood; was congruent to content  Thought Process/Associations: unremarkable  Thought Content:  Reports none;  Denies suicidal and violent ideation, delusions, obsessions , phobia , and paranoid ideation  Perception:  Reports none;  Denies hallucinations and delusions; intact   Insight: good  Judgment: good  Cognition: (6) oriented: time, person, and place  Gait and Station: unremarkable    ASSESSMENT DATA                                                                                          11/28/2023     8:46 AM 6/18/2024    10:12 AM 1/7/2025    10:11 AM   PHQ   PHQ-9 Total Score 0 2 13    Q9: Thoughts of better off dead/self-harm past 2 weeks Not at all Not at all Several days   F/U: Thoughts of suicide or self-harm   No   F/U: Safety concerns   No       Patient-reported      rated by patient as very difficult       No data to display                 ASSESSMENT SUMMARY     Fabiana Hu is a 64 year old  White Not  or  female with psychiatric history of chronic and persistent depression who presented for a comprehensive assessment of psychiatric symptoms in the Department of Psychiatry.  Fabiana was referred by Yancy Wright PharmD. Fabiana  presented today as a a good historian.  Fabiana has Good insight in their current circumstances. Diagnosis of Major Depressive Disorder, Recurrent, Moderate and Generalized Anxiety Disorder seems supported by patient report, collateral records, and the MINI 7.0.2. Further diagnostic clarification is not needed.  There are medical comorbidities which impact this treatment, Parkinson's Disease.  "    The patient was first diagnosed and treated for depression at age 24. Symptoms include persistent anhedonia, depressed mood/feeling hopeless/excessive crying, insomnia/difficulties with sleep, low energy, feeling bad about self/worthless/excessive guilt, poor concentration/memory/indecisiveness, and passive suicide ideation (\"it would be easier\"). Patient denied suicide plan and intent. She experiences symptoms of Generalized Anxiety Disorder, including excessive worry about several routine things, anxieties and worries present most days, difficulty controlling worries. During times of anxiety, pt endorsed feeling restless/ on edge, muscle tension, tired/ easily exhausted, mind going blank, irritability, insomnia. Comes with depression, feels more severe during current episode. She reports many of her worries are related to changes in physical health functioning from PD and consequences of physical changes such as socializing.    Psychosocial factors impacting treatment include Relationship Difficulties, Phase of Life Difficulties, and Medical Comorbidites. Psychosocial stressors were identified as health issues, mental health symptoms, and relationship stress. Patient has evidence of functional impairment including difficulty with home-family, home-chores, work, social-friends, community activities, daily responsibilities, self-care routines, and health maintenance. More specifically, Fabiana is concerned about the impacts of PD on her functioning such as socializing with friends and how she is perceived.  Goal is to increase their functioning detailed above and assist the patient to make progress towards their goals.  Patient identified the following strengths that will help them succeed in recovery:  has some insight, has family support, is medically insured, has a stable living environment, has mental health providers in place, able to seek help when in crisis, and is future oriented.  Things that may interfere " with their success include lacks coping skills.    The patient has tried a number of different psychiatric medications and therapy for both depression and sleep issues with minimal benefit. Patient agrees to treatment with cognitive behavior therapy with the capacity to do so. Agrees to call clinic for any problems. The patient understands to call 911 or come to the nearest ED if life threatening or urgent symptoms present.     Diagnostic Impression    F33.1 Major Depressive Disorder, Recurrent, Moderate   F41.1 Generalized Anxiety Disorder      Plan     - Weekly cognitive behavioral therapy with writer.  - RTC: 1 week  - For homework, pt agreed to engage in social outing.

## 2025-01-07 NOTE — PROGRESS NOTES
Visit 1 of 2    Client: Fabiana Hu  : 1960  MRN: [id number]  Date of Service: 2025    The patient was seen by Kathy Garcia M.S., PsyD practicum student. The limits of confidentiality were reviewed at the onset of the session. A brief interview was conducted to build rapport, understand the context of the referral, and assess for any safety concerns. Per patient request, was joined by  after she signed a consent to communicate form. The patient will complete the evaluation on 2025.

## 2025-01-08 ENCOUNTER — THERAPY VISIT (OUTPATIENT)
Dept: OCCUPATIONAL THERAPY | Facility: REHABILITATION | Age: 65
End: 2025-01-08
Payer: MEDICARE

## 2025-01-08 DIAGNOSIS — G20.A1 PARKINSON'S DISEASE, UNSPECIFIED WHETHER DYSKINESIA PRESENT, UNSPECIFIED WHETHER MANIFESTATIONS FLUCTUATE (H): ICD-10-CM

## 2025-01-08 DIAGNOSIS — R29.898 WEAKNESS OF BOTH HANDS: ICD-10-CM

## 2025-01-08 DIAGNOSIS — R41.3 MEMORY CHANGES: Primary | ICD-10-CM

## 2025-01-08 DIAGNOSIS — R25.1 TREMOR: ICD-10-CM

## 2025-01-08 PROCEDURE — 97535 SELF CARE MNGMENT TRAINING: CPT | Mod: GO | Performed by: OCCUPATIONAL THERAPIST

## 2025-01-09 ENCOUNTER — THERAPY VISIT (OUTPATIENT)
Dept: PHYSICAL THERAPY | Facility: REHABILITATION | Age: 65
End: 2025-01-09
Payer: MEDICARE

## 2025-01-09 ENCOUNTER — TELEPHONE (OUTPATIENT)
Dept: PSYCHIATRY | Facility: CLINIC | Age: 65
End: 2025-01-09

## 2025-01-09 DIAGNOSIS — G20.A1 PARKINSON'S DISEASE, UNSPECIFIED WHETHER DYSKINESIA PRESENT, UNSPECIFIED WHETHER MANIFESTATIONS FLUCTUATE (H): Primary | ICD-10-CM

## 2025-01-09 DIAGNOSIS — R26.9 ABNORMALITY OF GAIT: ICD-10-CM

## 2025-01-09 NOTE — TELEPHONE ENCOUNTER
On 1/7/2025, the patient signed a Consent to Communicate authorizing Person to Person communication with Adolph Hu () for scheduling and medical information. I scanned this document into the patient's chart on 1/9/2025. Chaz Bhatia

## 2025-01-13 ENCOUNTER — THERAPY VISIT (OUTPATIENT)
Dept: PHYSICAL THERAPY | Facility: REHABILITATION | Age: 65
End: 2025-01-13
Payer: MEDICARE

## 2025-01-13 DIAGNOSIS — R25.8 BRADYKINESIA: ICD-10-CM

## 2025-01-13 DIAGNOSIS — G20.A1 PARKINSON'S DISEASE, UNSPECIFIED WHETHER DYSKINESIA PRESENT, UNSPECIFIED WHETHER MANIFESTATIONS FLUCTUATE (H): Primary | ICD-10-CM

## 2025-01-13 DIAGNOSIS — R26.9 ABNORMALITY OF GAIT: ICD-10-CM

## 2025-01-13 PROCEDURE — 97110 THERAPEUTIC EXERCISES: CPT | Mod: GP | Performed by: PHYSICAL THERAPIST

## 2025-01-13 PROCEDURE — 97112 NEUROMUSCULAR REEDUCATION: CPT | Mod: GP | Performed by: PHYSICAL THERAPIST

## 2025-01-14 ENCOUNTER — OFFICE VISIT (OUTPATIENT)
Dept: PSYCHIATRY | Facility: CLINIC | Age: 65
End: 2025-01-14
Attending: PSYCHOLOGIST
Payer: MEDICARE

## 2025-01-14 DIAGNOSIS — F41.1 GENERALIZED ANXIETY DISORDER: ICD-10-CM

## 2025-01-14 DIAGNOSIS — F33.1 MODERATE EPISODE OF RECURRENT MAJOR DEPRESSIVE DISORDER (H): Primary | ICD-10-CM

## 2025-01-16 ENCOUNTER — THERAPY VISIT (OUTPATIENT)
Dept: PHYSICAL THERAPY | Facility: REHABILITATION | Age: 65
End: 2025-01-16
Payer: MEDICARE

## 2025-01-16 DIAGNOSIS — R25.8 BRADYKINESIA: ICD-10-CM

## 2025-01-16 DIAGNOSIS — G20.A1 PARKINSON'S DISEASE, UNSPECIFIED WHETHER DYSKINESIA PRESENT, UNSPECIFIED WHETHER MANIFESTATIONS FLUCTUATE (H): Primary | ICD-10-CM

## 2025-01-16 DIAGNOSIS — R26.9 ABNORMALITY OF GAIT: ICD-10-CM

## 2025-01-21 ASSESSMENT — PATIENT HEALTH QUESTIONNAIRE - PHQ9
10. IF YOU CHECKED OFF ANY PROBLEMS, HOW DIFFICULT HAVE THESE PROBLEMS MADE IT FOR YOU TO DO YOUR WORK, TAKE CARE OF THINGS AT HOME, OR GET ALONG WITH OTHER PEOPLE: SOMEWHAT DIFFICULT
SUM OF ALL RESPONSES TO PHQ QUESTIONS 1-9: 15
SUM OF ALL RESPONSES TO PHQ QUESTIONS 1-9: 15

## 2025-01-22 ENCOUNTER — TELEPHONE (OUTPATIENT)
Dept: BEHAVIORAL HEALTH | Facility: CLINIC | Age: 65
End: 2025-01-22

## 2025-01-22 ENCOUNTER — HOSPITAL ENCOUNTER (OUTPATIENT)
Dept: BEHAVIORAL HEALTH | Facility: CLINIC | Age: 65
Discharge: HOME | End: 2025-01-22
Attending: PSYCHIATRY & NEUROLOGY
Payer: MEDICARE

## 2025-01-22 ENCOUNTER — BEH TREATMENT PLAN (OUTPATIENT)
Dept: BEHAVIORAL HEALTH | Facility: CLINIC | Age: 65
End: 2025-01-22
Attending: PSYCHIATRY & NEUROLOGY

## 2025-01-22 DIAGNOSIS — F33.1 MODERATE EPISODE OF RECURRENT MAJOR DEPRESSIVE DISORDER (H): Primary | ICD-10-CM

## 2025-01-22 ASSESSMENT — ANXIETY QUESTIONNAIRES
5. BEING SO RESTLESS THAT IT IS HARD TO SIT STILL: MORE THAN HALF THE DAYS
IF YOU CHECKED OFF ANY PROBLEMS ON THIS QUESTIONNAIRE, HOW DIFFICULT HAVE THESE PROBLEMS MADE IT FOR YOU TO DO YOUR WORK, TAKE CARE OF THINGS AT HOME, OR GET ALONG WITH OTHER PEOPLE: NOT DIFFICULT AT ALL
GAD7 TOTAL SCORE: 11
GAD7 TOTAL SCORE: 11
6. BECOMING EASILY ANNOYED OR IRRITABLE: NOT AT ALL
2. NOT BEING ABLE TO STOP OR CONTROL WORRYING: MORE THAN HALF THE DAYS
3. WORRYING TOO MUCH ABOUT DIFFERENT THINGS: MORE THAN HALF THE DAYS
1. FEELING NERVOUS, ANXIOUS, OR ON EDGE: MORE THAN HALF THE DAYS
4. TROUBLE RELAXING: MORE THAN HALF THE DAYS
7. FEELING AFRAID AS IF SOMETHING AWFUL MIGHT HAPPEN: SEVERAL DAYS

## 2025-01-22 ASSESSMENT — COLUMBIA-SUICIDE SEVERITY RATING SCALE - C-SSRS
2. HAVE YOU ACTUALLY HAD ANY THOUGHTS OF KILLING YOURSELF?: NO
1. IN THE PAST MONTH, HAVE YOU WISHED YOU WERE DEAD OR WISHED YOU COULD GO TO SLEEP AND NOT WAKE UP?: NO
1. HAVE YOU WISHED YOU WERE DEAD OR WISHED YOU COULD GO TO SLEEP AND NOT WAKE UP?: YES

## 2025-01-22 ASSESSMENT — PAIN SCALES - GENERAL: PAINLEVEL_OUTOF10: NO PAIN (0)

## 2025-01-22 NOTE — TELEPHONE ENCOUNTER
**Patient Navigator Follow Up**    1/22/2025;    Referral: IOP-ADT, Individual Therapy & Psychiatry  Speciality Track: 55+    Schedule Preference: Afternoons    Barriers: N/A    Comments: I reached out to patient to follow up on IOP 55+ program referral, patient is set to start programming on Friday, 1/24. Welcome Letter has been sent.    Speciality order for psychiatry and individual therapy has been routed to in-house Alexandrea MAYER who will follow up with patient accordingly.     Thank you,    Norris SALGUERO  Patient Navigator

## 2025-01-22 NOTE — PROGRESS NOTES
LOCUS Worksheet     Name: Fabiana Hu MRN: [id number]    : 1960      Gender:  female    PMI:  NA   Provider Name: [facility]   Provider NPI:  [id number]    Actual level of Care Provided:  Assessment and Referral     Service(s) receiving or referred to: IOP/DT program for Seniors 55+ specializing in General Mood Disorders at [facility], Individual Therapy and Psychiatry for Medications Management.     Reason for Variance: Anxiety and Depression      Rating completed by: Charles Crow PhD, LPCC/LADC      I. Risk of Harm:   2      Low Risk of Harm    II. Functional Status:   2      Mild Impairment    III. Co-Morbidity:   2      Minor Co-Morbidity    IV - A. Recovery Environment - Level of Stress:   3      Moderately Stress Environment    IV - B. Recovery Environment - Level of Support:   4      Minimal Support in Environment    V. Treatment and Recovery History:   3      Moderate to Equivocal Response to Treatment and Recovery Management    VI. Engagement and Recovery Project:   2      Positive Engagement and Recovery       18 Composite Score    Level of Care Recommendation:   17 to 19       High Intensity Community Based Services

## 2025-01-22 NOTE — ADDENDUM NOTE
Encounter addended by: Charles Crow [facility] on: 1/22/2025 4:30 PM   Actions taken: Clinical Note Signed

## 2025-01-22 NOTE — TELEPHONE ENCOUNTER
**Patient Navigator Follow Up**    1/22/2025;    What program is this referral for? Adult MH Referral:  IOP/DT program for Seniors 55+ specializing in General Mood Disorders at [facility], Individual Therapy and Psychiatry for Medications Management.      Referral addressed.      Thank you,    Lisa TRACY  Patient Navigator

## 2025-01-22 NOTE — PROGRESS NOTES
"    [facility]        PATIENT'S NAME: Fabiana Hu  PREFERRED NAME: Fabiana  PRONOUNS:       MRN: [id number]  : 1960  ADDRESS:  [address]  [place] [id number]  [facility]  [id number]  DATE OF SERVICE: 25  START TIME: 12:30 PM  END TIME: 2:16 PM  PREFERRED PHONE: [phone]  May we leave a program related message: Yes  EMERGENCY CONTACT: was obtained Spouse, Mayte Carlton [phone] .  SERVICE MODALITY:  In-person    UNIVERSAL ADULT Mental Health DIAGNOSTIC ASSESSMENT    Identifying Information:  Patient is a 64 year old,   individual.  Patient was referred for an assessment by referring provider Deepak Hendrickson DNP at [facility].  Patient attended the session alone.  Chief Complaint:   The reason for seeking services at this time is: \" \"To better manage my mental health symptoms, depression, anxiety and sleep and obtain referrals, and recommendations related to programmatic care\". The problem(s) began \"at the age of 39 starting with Anxiety and Depression\". Patient has attempted to resolve these concerns in the past through: Individual Therapy, Psychiatry for Medications Management, No IP Hospitalizations reported.     Social/Family History:  Patient was born in  [facility]  and raised in  [place] .  Patient has not moved during childhood.  They were raised by biological parents.  Parents stayed ..   Patient reported that their childhood was \"good, loving, supportive and my parents took a good care me, they were very excited\".  Patient described their current relationships with family of origin as \"both parents are ; 4 full siblings: 3 older brothers and 1 younger sister, all of them are very supportive and have been good to me\"    The patient describes their cultural background as  and [place].  Patient did not identify any concerns about cultural, contextual or socioeconomic influences that need " to be addressed.  Cultural, Contextual, and socioeconomic factors do not affect the patient's access to services.  These factors will be addressed in the Preliminary Treatment plan.  Patient identified their preferred language to be English. Patient reported they do not  need the assistance of an  or other support involved in therapy.     Patient reported had no significant delays in developmental tasks.   Patient's highest education level was college graduate. Patient identified the following learning problems: none reported.  Modifications will not be used to assist communication in therapy.   Patient reports they are  able to understand written materials.    Patient reported the following relationship history .  Patient's current relationship status is  for 40 years.   Patient identified their sexual orientation as heterosexual.  Patient reported having two child(miguel angel), son 33 and daughter 36 year old. Patient identified siblings, adult children, friends, and spouse as part of their support system.  Patient identified the quality of these relationships as good.     Patient's current living/housing situation involves staying in own home/apartment.  They live with spouse Brandt and they report that housing is stable.     Patient is currently retired.  Patient reports their finances are obtained through  social security and pension, spouse .  Patient does not identify finances as a current stressor.      Patient reported that they have not been involved with the legal system.  Patient denies being on probation / parole / under the jurisdiction of the court.    Patient's Strengths and Limitations:  Patient identified the following strengths or resources that will help them succeed in treatment: commitment to health and well being, exercise routine, friends / good social support, family support, intelligence, and motivation. Things that may interfere with the patient's success in treatment  include: few friends and lack of social support.     Assessments:  The following assessments were completed by patient for this visit:  PHQ9:       11/28/2023     8:46 AM 6/18/2024    10:12 AM 1/7/2025    10:11 AM 1/21/2025     3:38 PM   PHQ-9 SCORE   PHQ-9 Total Score MyChart   13 (Moderate depression) 15 (Moderately severe depression)   PHQ-9 Total Score 0 2 13  15        Patient-reported     GAD7:       1/22/2025    12:00 PM   DAVID-7 SCORE   Total Score 11     CAGE-AID:       1/7/2025    10:34 AM   CAGE-AID Total Score   Total Score 0    Total Score MyChart 0 (A total score of 2 or greater is considered clinically significant)       Patient-reported     PROMIS 10-Global Health (all questions and answers displayed):       1/7/2025    10:34 AM 1/21/2025     3:44 PM   PROMIS 10   In general, would you say your health is: Good Good   In general, would you say your quality of life is: Fair Fair   In general, how would you rate your physical health? Good Fair   In general, how would you rate your mental health, including your mood and your ability to think? Fair Fair   In general, how would you rate your satisfaction with your social activities and relationships? Fair Fair   In general, please rate how well you carry out your usual social activities and roles Fair Fair   To what extent are you able to carry out your everyday physical activities such as walking, climbing stairs, carrying groceries, or moving a chair? Mostly Mostly   In the past 7 days, how often have you been bothered by emotional problems such as feeling anxious, depressed, or irritable? Often Sometimes   In the past 7 days, how would you rate your fatigue on average? Mild Moderate   In the past 7 days, how would you rate your pain on average, where 0 means no pain, and 10 means worst imaginable pain? 1 0   In general, would you say your health is: 3 3   In general, would you say your quality of life is: 2 2   In general, how would you rate your  physical health? 3 2   In general, how would you rate your mental health, including your mood and your ability to think? 2 2   In general, how would you rate your satisfaction with your social activities and relationships? 2 2   In general, please rate how well you carry out your usual social activities and roles. (This includes activities at home, at work and in your community, and responsibilities as a parent, child, spouse, employee, friend, etc.) 2 2   To what extent are you able to carry out your everyday physical activities such as walking, climbing stairs, carrying groceries, or moving a chair? 4 4   In the past 7 days, how often have you been bothered by emotional problems such as feeling anxious, depressed, or irritable? 4 3   In the past 7 days, how would you rate your fatigue on average? 2 3   In the past 7 days, how would you rate your pain on average, where 0 means no pain, and 10 means worst imaginable pain? 1 0   Global Mental Health Score 8  9    Global Physical Health Score 15  14    PROMIS TOTAL - SUBSCORES 23  23        Patient-reported     [place] Suicide Severity Rating Scale (Lifetime/Recent)      1/22/2025    12:00 PM   [place] Suicide Severity Rating (Lifetime/Recent)   1. Wish to be Dead (Lifetime) Y   Wish to be Dead Description (Lifetime) passive  thoughts, no mean or plan   1. Wish to be Dead (Past 1 Month) N   2. Non-Specific Active Suicidal Thoughts (Lifetime) N   Calculated C-SSRS Risk Score (Lifetime/Recent) No Risk Indicated     [place] Suicide Severity Rating Scale (Short Version)       No data to display                Personal and Family Medical History:  Patient does report a family history of mental health concerns.  Patient reports family history includes Arthritis in her brother; Bipolar Disorder in her daughter; Cerebrovascular Disease in her father; Depression in her brother; Heart Disease in her brother and father; LUNG DISEASE in her mother; Obesity in her son; Other -  "See Comments in her brother, daughter, mother, sister, and son; Prostate Cancer in her brother..     Patient does report Mental Health Diagnosis and/or Treatment.  Patient Patient reported the following previous diagnoses which include(s): Depression and Anxiety.  Patient reported symptoms began \"at the age of 39 starting with Anxiety and Depression\".   Patient has received mental health services in the past: Individual Therapy, Psychiatry for Medications Management, No IP Hospitalizations reported Psychiatric Hospitalizations: None.  Patient denies a history of civil commitment.  Patient is receiving other mental health services.  These include psychiatry with PAUL Faulkner at [facility].  Next appointment: scheduled for February . Therapy, Psychiatry for Medications Management, No IP Hospitalizations reported.       Patient has had a physical exam to rule out medical causes for current symptoms.  Date of last physical exam was within the past year. Client was encouraged to follow up with PCP if symptoms were to develop. The patient has a non-[facility] Primary Care Provider. Their PCP is Gianna Whitmore [facility]..  Patient reports no current medical and/or dental concerns.  Patient denies any issues with pain..   There are significant appetite / nutritional concerns / weight changes.   Patient does not report a history of head injury / trauma / cognitive impairment.      Patient reports current meds as:   Current Outpatient Medications   Medication Sig Dispense Refill    artificial saliva (BIOTENE MT) AERS spray Take 1 spray by mouth every 6 hours as needed for dry mouth. 10 mL 3    carbidopa-levodopa (SINEMET)  MG tablet 1 tab @ 7:30am, 1/2 tab at 11:30am, 4:30pm and 7:30pm, 1 tab @10pm 315 tablet 3    DAYVIGO 10 MG TABS Take 5-10 mg by mouth at bedtime.      doxepin (SINEQUAN) 10 MG capsule Take 10 mg by mouth at bedtime.      gabapentin (NEURONTIN) 300 MG capsule Take 600 mg by " mouth at bedtime.      GEMTESA 75 MG TABS tablet TAKE 1 TABLET BY MOUTH EVERY DAY 90 tablet 1    levothyroxine (SYNTHROID/LEVOTHROID) 75 MCG tablet Take 1 tablet by mouth daily      lithium (ESKALITH) 300 MG tablet Take 600 mg by mouth at bedtime.      QUEtiapine (SEROQUEL) 25 MG tablet Take 25 mg by mouth at bedtime. For sleep and anxiety      vitamin D3 (CHOLECALCIFEROL) 50 mcg (2000 units) tablet Take 1 tablet by mouth daily.      oxyBUTYnin (OXYTROL) 3.9 MG/24HR BIW patch Place 1 patch over 96 hours onto the skin twice a week. (Patient not taking: Reported on 1/22/2025) 1 patch 3     Current Facility-Administered Medications   Medication Dose Route Frequency Provider Last Rate Last Admin    botulinum toxin type B (MYOBLOC) injection 5,000 Units  5,000 Units Intramuscular See Admin Instructions         botulinum toxin type B (MYOBLOC) Solution 2,500 Units  2,500 Units Intramuscular See Admin Instructions Jammie West MD   2,500 Units at 04/30/24 1646       Medication Adherence:  Patient reports taking.  taking psychiatric medications as prescribed.    Patient Allergies:  No Known Allergies    Medical History:    Past Medical History:   Diagnosis Date    Parkinson's disease (H)     Urinary incontinence 05/07/2023    urge incontinence         Current Mental Status Exam:   Appearance:  Appropriate    Eye Contact:  Fair   Psychomotor:  Agitated       Gait / station:  no problem  Attitude / Demeanor: Cooperative  Friendly  Speech      Rate / Production: Normal/ Responsive      Volume:  Soft  volume      Language:  intact  Mood:   Anxious  Depressed   Affect:   Appropriate    Thought Content: Clear   Thought Process: Coherent       Associations: No loosening of associations  Insight:   Good   Judgment:  Intact   Orientation:  All  Attention/concentration: Fair    Substance Use:   Patient did report a family history of substance use concerns; see medical history section for details.  Patient has not received  chemical dependency treatment in the past.  Patient has not ever been to detox.      Patient is not currently receiving any chemical dependency treatment. Patient reported the following problems as a result of their substance use:  None reported. .    Patient reports using caffeine 1 times per week and drinks 1 at a time. Patient started using caffeine at age 13.  Patient reports obtaining substances from self.    Substance Use: No symptoms    Based on the CAGE score of 0 and clinical interview there  are not indications of drug or alcohol abuse.    Significant Losses / Trauma / Abuse / Neglect Issues:   Patient did not  serve in the .  There are not indications or report of significant loss, trauma, abuse or neglect issues related to: are no indications and client denies any losses, trauma, abuse, or neglect concerns.  Patient has not been a victim of exploitation.  Concerns for possible neglect are not present.     Safety Assessment:   Patient denies current or past homicidal ideation and behaviors.  Patient denies current or past suicidal ideation and behaviors.  Patient denies current or past self-injurious behaviors.  Patient denied risk behaviors associated with substance use.  Patient denies any high risk behaviors associated with mental health symptoms.  Patient denied current or past personal safety concerns.    Patient denies past of current/recent assaultive behaviors.    Patient denied a history of sexual assault behaviors.     Patient reports there are not   firearms in the house.    Patient reports the following protective factors: access to and engagement with healthcare, current engagement in treatment and/or motivation to establish therapeutic relationship, supportive social network or family, and responsibilities to others dedication to family or friends; safe and stable environment; regular physical activity; sense of belonging; living with other people.    Risk Plan:  See Recommendations  for Safety and Risk Management Plan    Review of Symptoms per patient report:   Depression: Lack of interest or pleasure in doing things, Feeling sad, down, or depressed, Feelings of hopelessness, Change in energy level, Change in sleep, Low self-worth, Difficulties concentrating, Psychomotor slowing or agitation, Excessive or inappropriate guilt, Feelings of helplessness, Ruminations, Irritability, and Withdrawn  Tammy:  No Symptoms  Psychosis: No Symptoms  Anxiety: Excessive worry, Nervousness, Physical complaints, such as headaches, stomachaches, muscle tension, Social anxiety, Sleep disturbance, Psychomotor agitation, Ruminations, Poor concentration, and Irritability  Panic:  No symptoms  Post Traumatic Stress Disorder:  No Symptoms   Eating Disorder: No Symptoms  ADD / ADHD:  No symptoms  Conduct Disorder: No symptoms  Autism Spectrum Disorder: No symptoms  Obsessive Compulsive Disorder: No Symptoms  Personality Disorders:   None reported    Patient reports the following compulsive behaviors and treatment history: None.      Diagnostic Criteria:   Generalized Anxiety Disorder  A. Excessive anxiety and worry about a number of events or activities (such as work or school performance).   B. The person finds it difficult to control the worry.  C. Select 3 or more symptoms (required for diagnosis). Only one item is required in children.   - Restlessness or feeling keyed up or on edge.    - Being easily fatigued.    - Difficulty concentrating or mind going blank.    - Irritability.    - Muscle tension.    - Sleep disturbance (difficulty falling or staying asleep, or restless unsatisfying sleep).  Major Depressive Disorder  CRITERIA (A-C) REPRESENT A MAJOR DEPRESSIVE EPISODE - SELECT THESE CRITERIA  A) Recurrent episode(s) - symptoms have been present during the same 2-week period and represent a change from previous functioning 5 or more symptoms (required for diagnosis)   - Depressed mood. Note: In children and  adolescents, can be irritable mood.     - Diminished interest or pleasure in all, or almost all, activities.    - Significant weight loss when not dieting decrease in appetite.    - Decreased sleep.    - Psychomotor activity agitation.    - Fatigue or loss of energy.    - Feelings of worthlessness or excessive guilt.    - Diminished ability to think or concentrate, or indecisiveness.     Functional Status:  Patient reports the following functional impairments:  organization, relationship(s), self-care, and social interactions.     Adult  Programmatic care:  Current LOCUS was assigned and patient needs the following level of care based on score 18.    1. Does the patient have a history of vulnerability such as being teased, picked on, or other indications of potential safety issues with other residents?  No    2. Does this patient have a history of being the victim of abuse? No history of abuse reported or documented.    3. Does this patient have a history of victimizing others? No     4. Does the patient have a history of boundary violations?  No.    5. Does the patient have a history of other sexual acting out behaviors (e.g grooming)?   No    6. Does the patient have a history of threats to self or others? Fire setting, running away or other self-injurious behaviors?    No    7. Does the patient s history indicate the need for special precautions or particular staffing patterns in the facility?  No      NOTE: If this screening indicates that the patient is at risk to harm self or others, notify staff at referral location.    Clinical Summary:  1. Psychosocial Factors:  Medical complexities. worsened mental health conditions, other life stressors, helplessness/hopelessness, limited social support. Patient did not identify any concerns about cultural or contextual factors that need to be addressed  2. Principal DSM5 Diagnoses  (Sustained by DSM5 Criteria Listed Above):   296.32 (F33.1) Major Depressive Disorder,  Recurrent Episode, Moderate _ and With anxious distress  300.02 (F41.1) Generalized Anxiety Disorder.   3. Other Diagnoses that is relevant to services:   NA.  4. Provisional Diagnosis:  NA  5. Prognosis: Expect Improvement.  6. Likely consequences of symptoms if not treated: patient's ongoing symptoms are more than likely to get worse and experience a decreased daily in functioning and may require a higher level of care. .  7. Patient strengths include:  educated, empathetic, good listener, has a previous history of therapy, intelligent, motivated, open to suggestions / feedback, support of family, friends and providers, willing to ask questions, willing to relate to others, and work history .     Recommendations:     1. Plan for Safety and Risk Management:   Safety and Risk: Recommended that patient call 911 or go to the local ED should there be a change in any of these risk factors        Report to child / adult protection services was NA.     2. Patient's did not identified any cultural, matthew / Catholic / spiritual influences that will need to be incorporated into care.     3. Initial Treatment will focus on:   Depressed Mood -    Anxiety -   .     4. Resources/Service Plan:    services are not indicated.   Modifications to assist communication are not indicated.   Additional disability accommodations are not indicated.      5. Collaboration:   Collaboration / coordination of treatment will be initiated with the following  support professionals: Targeted Case Management (TCM).      6.  Referrals:   The following referral(s) will be initiated:  IOP/DT program for Seniors 55+ specializing in General Mood Disorders at [facility], Individual Therapy and Psychiatry for Medications Management.       A Release of Information has been obtained for the following: Targeted Case Management (TCM).     Clinical Substantiation/medical necessity for the above recommendations:  Patient is  "64-year-old   female with two adult children dn history of Anxiety and Depression. She has a CNP who is currently prescribing her medications and no therapist thus, the  offered a referral to Individual Therapy and Psychiatry for Medication Management, the patient accepted.In addition, she is interested in joining outpatient Mental Health Therapy Group: IOP/DT program for Seniors 55+ specializing in General Mood Disorders at [facility] due to \" improve her worsened symptoms of anxiety, depression and extend her social and supportive network\". All referrals were sent to to [facility]. Patient is not currently under the influence of alcohol or illicit substances, denies experiencing command hallucinations, and has no direct access to firearms. Patient's acute risk could be higher if noncompliant with treatment plan, medications, follow-up appointments or using illicit substances or alcohol. Protective factors include: access to and engagement with healthcare, current engagement in treatment and/or motivation to establish therapeutic relationship, supportive social network or family, and responsibilities to others dedication to family or friends; safe and stable environment; regular physical activity; sense of belonging; living with other people. The patient's strengths are:  educated, empathetic, good listener, has a previous history of therapy, intelligent, motivated, open to suggestions / feedback, support of family, friends and providers, willing to ask questions, willing to relate to others, and work history Patient instructed to present to her nearest emergency room if symptoms deteriorate....    7. ALYSSA:    ALYSSA:  Discussed the general effects of drugs and alcohol on health and well-being. Provider gave patient printed information about the effects of chemical use on their health and well being. Recommendations:  Abstinence, AA groups, Sponsor and other " available community resources as needed.  .     8. Records:   These were reviewed at time of assessment.   Information in this assessment was obtained from the medical record and  provided by patient who is a good historian. Patient will have open access to their mental health medical record.    9.   Interactive Complexity: No    10. Safety Plan:     [facility] Safety Plan      Creation Date: 1/22/25       Step 1: Warning signs:    Warning Signs    Worcened symptoms of Anxiety an dDepression    Mood swing also    Isoaltion      Step 2: Internal coping strategies - Things I can do to take my mind off my problems without contacting another person:    Strategies    going for a walk if the weather permits    Calling my friend Katie    Calling my sister Asha      Step 3: People and social settings that provide distraction:    Name Contact Information    My  Adolph     My sister Asha     My friend Katie     My friend Katie        Places    Inh[employer]    Groceries shopping      Step 4: People whom I can ask for help during a crisis:    Name Contact Information    My spouse     My sister     My provider       Step 5: Professionals or agencies I can contact during a crisis:    Clinician/Agency Name Phone Emergency Contact    [facility]      Text 988      [facility] Crisis Line  [phone]      [facility] Emergency Department Emergency Department Address Emergency Department Phone    [facility]        Suicide Prevention Lifeline Phone: Call or Text 988  Crisis Text Line: Text HOME to 948853     Step 6: Making the environment safer (plan for lethal means safety):   Did not identify any lethal methods     Optional: What is most important to me and worth living for?:   My family, my spouse and children     Justyna Safety Plan. Tonya Garcia and Luiz Pope. Used with permission of the authors.         Provider Name/ Credentials:  Charles Crow PhD, [facility], [facility].    [facility]  [address] [place] [address], [address] , [place] [address]   [email or web address]  Office: [phone] Fax: [phone]  January 22, 2025

## 2025-01-22 NOTE — TELEPHONE ENCOUNTER
Summary of Patient Care Communication Handoff to Patient Navigator Coordinator    PATIENT'S NAME: Fabiana Hu  MRN:   [id number]  :   1960    DATE OF SERVICE: 25    Referral Needed: Yes    Is the patient coming from an inpatient unit? No    What program is this referral for? Adult MH Referral:  IOP/DT program for Seniors 55+ specializing in General Mood Disorders at [facility], Individual Therapy and Psychiatry for Medications Management.

## 2025-01-23 ENCOUNTER — TELEPHONE (OUTPATIENT)
Dept: BEHAVIORAL HEALTH | Facility: CLINIC | Age: 65
End: 2025-01-23

## 2025-01-23 NOTE — TELEPHONE ENCOUNTER
----- Message from Norris WESTBROOK sent at 2025  3:56 PM CST -----  Regardin/24 IOP/DT-3 55+ Admit  Adult Mental Health Programmatic Care Schedule Request    Patient Name: Fabiana Hu  Location of programming: [facility]  Start Date: 2025    Adult Program Group: Adult Program Group: IOP/DT 3  55+ Track [[id number]]  Schedule: M, W, Th, F 1pm-4pm  12 hours per week for 9 weeks  Number of visits to be scheduled: 36 days    Attending Provider (MD):  Dr. Brandt Campos ([place]); Dr. Marc Ernst ([place])  Visit Type:  In-Person    Accommodations Needed: N/A  Alerts Identified/Substantiation: N/A  Consulted with Supervisor: N/A

## 2025-01-23 NOTE — PROGRESS NOTES
"RN Review of Medical History / Physical Health Screen  Outpatient Mental Health Programs - [facility] Adult Mental Health Outpatient Programs    PATIENT'S NAME: Fabiana Hu  Preferred name: Fabiana   64 year old  MRN:   [id number]  :   1960  ACCT. NUMBER: [id number]  CURRENT AGE:  64 year old    DATE OF DIAGNOSTIC ASSESSMENT: 25  DATE OF ADMISSION: 2025     Please see Diagnostic Assessment for additional Medical History.     General Health:   Have you had any exposure to any communicable disease in the past 2-3 weeks? no      Do you have a history of seizures?     If so, do you have a seizure plan? Known triggers?     Notify patient that we will call 911 (if virtual) or a code (if in-person), if we were to witness seizure during group. no    no  no    no     Nutrition:    Are you on a special diet? If yes, please explain:  no   Do you have any concerns regarding your nutritional status? If yes, please explain:  no   Have you had any appetite changes in the last 3 months?  No     Have you had any weight loss or weight gain in the last 3 months?  Weight loss            Height/Weight Review:  Patient reported height:  5'0\"      Patient reports weight:  Date last checked:  118 lb   2025       Patient height and weight recorded by RN in epic flowsheet: No; Unable to measure  Programmatic Care currently provided via telehealth. All pt weights and heights will be collected through patient self-report and recorded in physical health screening progress note upon admission to the program.      BMI Review:  Was the patient informed of BMI? No.     Findings Normal, No Intervention         Fall Risk:   Have you had any falls in the past 3 months? no     Do you currently use any assistive devices for mobility?   no      Does the patient have medication concerns? yes     Was an MTM referral placed? yes      Does the patient have any acute or chronic pain concerns that might impact participation " in the program? no       Additional Comments/Assessment: After call ended, writer messaged supervisor and asked that she call Fabiana.  Fabiana requested a call from a supervisor today 1/23/2025.  She would prefer to see a psychiatrist rather than a psychiatric nurse practitioner.     Per completion of the Medical History / Physical Health Screen, is there a recommendation to see / follow up with a primary care physician/clinic or dentist?    Yes, Recommendations:   physical exam          Zulema Sy RN  1/23/2025

## 2025-01-23 NOTE — PROGRESS NOTES
"    Admission SBAR NOTE  Adult  Outpatient Programs          SITUATION:     Admission Date: 2025    Provider verified identity through the following two step process.  Patient provided: verbal spelling of full first and last name and Patient     Patient name:  Fabiana Hu  Preferred name: Fabiana She/Her/Hers/Herself 64 year old  Diagnosis/-es (copy from [facility], including ICD-10):   296.32 (F33.1) Major Depressive Disorder, Recurrent Episode, Moderate _ and With anxious distress  300.02 (F41.1) Generalized Anxiety Disorder.     Assigned Program/Track: IOP/DT 3    Reviewed patient's schedule and informed them of any variation due to holidays. yes    Does the patient have any planned absences and/or barriers to admission/treatment? no  NOTE: impact of transportation, technology, childcare, work, or housing concerns.    Insurance: Payor: MEDICARE / Plan: MEDICARE / Product Type: Medicare /  Changes/Concerns: no    Does patient need an appointment with the program provider? yes  NOTE: If yes, please confirm/schedule provider visit.      BACKGROUND:     Patient's stated goal/reason for treatment (copy from [facility]; confirm with patient): \"To better manage my mental health symptoms, depression, anxiety and sleep and obtain referrals, and recommendations related to programmatic care\"    \"Stop having depression\"       ASSESSMENT:     Please consult  if any of the following concerns may impact admission/participation in program:     PHQ, DAVID and PROMIS done within 7 days OR send upon admission if over 7 days.      [place] Suicide Severity Rating (select Lifetime/Recent): [place] Suicide Severity Rating Scale (Lifetime/Recent)      2025    12:00 PM   [place] Suicide Severity Rating (Lifetime/Recent)   1. Wish to be Dead (Lifetime) Y   Wish to be Dead Description (Lifetime) passive  thoughts, no mean or plan   1. Wish to be Dead (Past 1 Month) N   2. Non-Specific Active Suicidal Thoughts (Lifetime) N "   Calculated C-SSRS Risk Score (Lifetime/Recent) No Risk Indicated       LOCUS completed for recommended level of care? yes  IOP: 17-19; PHP: 20-22     Copy/Paste current Safety Plan to the BEH TX PLAN ENCOUNTER. yes    Safety status/concerns: no     Substance use concerns: no  NOTE: if no substance use concerns, OK to delete below:    Based on the CAGE score of 0 and clinical interview there  are not indications of drug or alcohol abuse.    Pertinent Medical/Nutritional concerns: no    Confirm Emergency Contact listed in the SnapShot/Demographics with patient and notify [facility] if an update is required. yes    Paper or Docusign requirements for ROIs, e-SILVIA, emergency contact, etc have been completed? no  If not, do upon admission.     Does patient have FMLA or Short-Term Disability requests/plans? no Patient is currently retired  NOTE: Whenever possible, FMLA or Short-Term Disability paperwork needs to be managed/completed by the patient's community provider.   Exceptions: Patient does not have a community provider AND request is specific to mental health and time off for the duration of the program participation.    Notify RN Triage as soon as possible.     Care Providers/Medication Management Needs:     Does patient have a current community or other [facility] provider prescribing medications for mental health? yes  NOTE: Delete below if not applicable:    Psychiatric Provider (or PCP if managing MH meds)/Name: PAUL ReeceCasey   Clinic name/location: [facility]  Last appointment:  Unknown  Next appointment:  Scheduled for February     NOTE: Inform patients, program is temporary and we will not be transferring care. Patient's should continue to see their community provider.       Individual Therapist/Name:  none       Patient will continue to see above provider while participating in program: yes        RECOMMENDATIONS:     Patient Admission Completed: yes    Care Team, referrals made/needed: yes  PCP:  No Ref-Primary, Physician  NOTE: Notify RN, as needed, to make internal referrals.                                                             Completed by: Zulema Sy RN

## 2025-01-23 NOTE — PROGRESS NOTES
Justyna Safety Plan       Creation Date: 1/22/25        Step 1: Warning signs:     Warning Signs     Worcened symptoms of Anxiety an dDepression     Mood swing also     Isoaltion      Step 2: Internal coping strategies - Things I can do to take my mind off my problems without contacting another person:     Strategies     going for a walk if the weather permits     Calling my friend Katie     Calling my sister Asha      Step 3: People and social settings that provide distraction:     Name Contact Information     My  Adolph       My sister Asha       My friend Katie       My friend Katie          Places     [employer]     Groceries shopping      Step 4: People whom I can ask for help during a crisis:     Name Contact Information     My spouse       My sister       My provider        Step 5: Professionals or agencies I can contact during a crisis:     Clinician/Agency Name Phone Emergency Contact     [facility]         Text 719         [facility] Crisis Line   [phone]        [facility] Emergency Department Emergency Department Address Emergency Department Phone     [facility]          Suicide Prevention Lifeline Phone: Call or Text 981  Crisis Text Line: Text HOME to 866721     Step 6: Making the environment safer (plan for lethal means safety):   Did not identify any lethal methods     Optional: What is most important to me and worth living for?:   My family, my spouse and children     Justyna Safety Plan. Tonya Garcia and Luiz Pope. Used with permission of the authors.

## 2025-01-24 ENCOUNTER — HOSPITAL ENCOUNTER (OUTPATIENT)
Dept: BEHAVIORAL HEALTH | Facility: CLINIC | Age: 65
Discharge: HOME | End: 2025-01-24
Attending: PSYCHIATRY & NEUROLOGY
Payer: MEDICARE

## 2025-01-24 VITALS
DIASTOLIC BLOOD PRESSURE: 51 MMHG | RESPIRATION RATE: 16 BRPM | OXYGEN SATURATION: 96 % | HEART RATE: 78 BPM | SYSTOLIC BLOOD PRESSURE: 115 MMHG

## 2025-01-24 DIAGNOSIS — F33.1 MODERATE EPISODE OF RECURRENT MAJOR DEPRESSIVE DISORDER (H): Primary | ICD-10-CM

## 2025-01-24 DIAGNOSIS — F41.1 GENERALIZED ANXIETY DISORDER: ICD-10-CM

## 2025-01-24 PROCEDURE — 99205 OFFICE O/P NEW HI 60 MIN: CPT

## 2025-01-24 NOTE — H&P
"[facility] Mental Health Outpatient Programs  Initial Psychiatric Evaluation    Patient: Fabiana Hu  Preferred Name: Fabiana  MRN: [id number]  : 1960  Acct. No.: [id number]  Date of Service: 25  Program Track: IOP/DT 3 55+ [place]    Date of Diagnostic Assessment/Psychosocial Evaluation: 2025    Identifying Data:  Fabiana Hu, a 64 year old-year-old with reported history of depression, presents for initial visit to provide oversight of programmatic care. Patient attended the session alone, uses she/her pronouns, and prefers to be called: \"Fabiana\"    Presenting Concern:  Per diagnostic assessment: \"depression sleep\"    History of Present Illness:  Chart reviewed, history as documented reviewed with Fabiana. Patient endorses:  History depression and anxiety  No prior hospitalization  Worked with psychiatrist and has done therapy  Current episode started about October last year, and was related to sleep  I cannot seem to get out it. 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   Lemborexant (Dayvigo) 5-10 mg at bed time  Been taking since December  Given by sleep doctor  Doxepin 10 mg at bed time  Gabapentin 300 mg at bed time  Lithium 600 mg at bed time  Quetiapine 25 mg at bed time    Goals for Treatment (also please see individualized treatment plan):  No more depression    Review of Symptoms  Review of systems as recorded in diagnostic assessment reviewed with patient.  Today notes:  Sleep: It is better than it was  I have test octography test says that I sleep more than I thoughts  I am not feeling rested in the morning  Appetite: It I very good  Lack of interest  Feeling flat  Hard " time getting out    Suicidal ideation: denies current or recent suicidal ideation or behavior  Thoughts of non-suicidal self-injury: denied  Recent self-injurious behavior: denied  Homicidal ideation: denied  Other safety concerns: denied    Activities of Daily Living and Related Systems Impacted by Illness:  Hygiene: I am trying to do that.   Socialization: isolated  Activities of Daily Living: (cleaning, shopping, bills, etc.): I do those with less enthusiasm   Concerns related to work: no    Substance use:  Denies    Current Medications:  Current Outpatient Medications   Medication Sig Dispense Refill    artificial saliva (BIOTENE MT) AERS spray Take 1 spray by mouth every 6 hours as needed for dry mouth. 10 mL 3    carbidopa-levodopa (SINEMET)  MG tablet 1 tab @ 7:30am, 1/2 tab at 11:30am, 4:30pm and 7:30pm, 1 tab @10pm 315 tablet 3    DAYVIGO 10 MG TABS Take 5-10 mg by mouth at bedtime.      doxepin (SINEQUAN) 10 MG capsule Take 10 mg by mouth at bedtime.      gabapentin (NEURONTIN) 300 MG capsule Take 600 mg by mouth at bedtime.      GEMTESA 75 MG TABS tablet TAKE 1 TABLET BY MOUTH EVERY DAY 90 tablet 1    levothyroxine (SYNTHROID/LEVOTHROID) 75 MCG tablet Take 1 tablet by mouth daily      lithium (ESKALITH) 300 MG tablet Take 600 mg by mouth at bedtime.      QUEtiapine (SEROQUEL) 25 MG tablet Take 25 mg by mouth at bedtime. For sleep and anxiety      vitamin D3 (CHOLECALCIFEROL) 50 mcg (2000 units) tablet Take 1 tablet by mouth daily.         The above list was reviewed and updated in [facility] with patient today.     Patient is taking medications as prescribed and denies adverse effects    Medication History/Past Medication Trials:  Not assessed     Remainder of history, including psychiatric, substance, family, social as documented in diagnostic assessment/psychosocial evaluation and/or above    Medical Review of Systems:  Pertinent: None    Laboratory Results:  Most recent labs reviewed. Pertinent  "updates/findings: None.       Mental Status Examination:  Vital Signs: /51   Pulse 78   Resp 16   SpO2 96%    Appearance: adequately groomed, appears stated age, and in no apparent distress.  Attitude: cooperative   Eye Contact: good   Muscle Strength and Tone: normal  Psychomotor Behavior: normal or unremarkable   Gait and Station: normal width, turn, arm swing  Speech: clear, coherent, decreased prosody, regular rate, regular rhythm, and fluent  Associations: No loosening of associations  Thought Process: coherent and goal directed  Thought Content: no evidence of suicidal ideation or homicidal ideation, no evidence of psychotic thought, no auditory hallucinations present, and no visual hallucinations present  Mood: \"anxious and depressed\"  Affect: mood congruent, intensity is blunted, and intensity is flat  Insight: good  Judgment: intact, adequate for safety  Impulse Control: intact  Oriented to: time, place, person, and situation  Attention Span and Concentration: Normal  Language: Intact  Recent and Remote Memory: Delayed & immediate recall intact  Fund of Knowledge/Assessment of Intelligence: Average  Capacity of Activities of Daily Living: Independent, able to participate in programmatic care services.    DSM5 Diagnosis/es:    ICD-10-CM    1. Moderate episode of recurrent major depressive disorder (H)  F33.1       2. Generalized anxiety disorder  F41.1         Assessment/Plan:  Fabiana presents today for initial psychiatric evaluation as part of oversight of programmatic care. She comes with a history of depression and anxiety, with no prior hospitalizations. Current depressive episode began in October, possibly related to sleep issues in the context of medical struggles with Parkinson s disease. She denied history of trauma, substance abuse, or psychosis.   Medications include Lemborexant (Dayvigo), Doxepin, Gabapentin, Lithium, and Quetiapine, managed by outpatient provider at [facility].     She " complained of lack of interest, flat affect, and difficulty getting motivated. Current goals include resolving depression. To address these concerns, she will engage in psychotherapy in IOP to develop coping skills, reduce safety risks, and improve personal self-care. Continued medication management may be necessary to optimize symptom control, particularly regarding sleep and mood.  Follow up in 3 weeks or sooner as needed    Depression  Engage in psychotherapy  Continue to work with outpatient provider for medication management  Continue with current medication regimen  Improve sleep hygiene  Maintain a balanced diet  Engage in physical activities as tolerated    Anxiety  As noted above    Safety Assessment:  Fabiana reports suicidal ideation and/or non-suicidal self-injury or thoughts thereof as noted above  Fabiana is future-oriented and is engaged in treatment planning   I do not feel that Fabiana meets criteria for a 72-hour involuntary hold and remains appropriate for an outpatient level of care      Signed:   KIRSTIN SINGH DNP   January 24, 2025      Visit Details:  Type of service: In-person  Location (patient and provider): [facility] Adult Mental Health Outpatient Programmatic Care Offices    Level of Medical Decision Making:   - At least 1 chronic problem that is not stable  - Engaged in prescription drug management during visit (discussed any medication benefits, side effects, alternatives, etc.)  Discussion of management or test interpretation with external physician/other qualified healthcare professional/appropriate source - programmatic care multidisciplinary treatment team    Chart review as part of intake process includes diagnostic assessment/psychosocial evaluation and any recent updates, as well as recent ED and/or inpatient documentation, where applicable.The reader is referred to those sources for additional information.    60 min spent on the date of the encounter in chart review, patient  visit, review of tests, documentation, care coordination, and/or discussion with other providers about the issues documented above.      This document completed in part using [employer] dictation software and therefore may contain inadvertent word or phrase substitutions.

## 2025-01-27 ENCOUNTER — HOSPITAL ENCOUNTER (OUTPATIENT)
Dept: BEHAVIORAL HEALTH | Facility: CLINIC | Age: 65
End: 2025-01-27
Attending: PSYCHIATRY & NEUROLOGY
Payer: MEDICARE

## 2025-01-27 ENCOUNTER — THERAPY VISIT (OUTPATIENT)
Dept: PHYSICAL THERAPY | Facility: REHABILITATION | Age: 65
End: 2025-01-27
Payer: MEDICARE

## 2025-01-27 DIAGNOSIS — R26.9 ABNORMALITY OF GAIT: ICD-10-CM

## 2025-01-27 DIAGNOSIS — F41.1 GENERALIZED ANXIETY DISORDER: Primary | ICD-10-CM

## 2025-01-27 DIAGNOSIS — G20.A1 PARKINSON'S DISEASE, UNSPECIFIED WHETHER DYSKINESIA PRESENT, UNSPECIFIED WHETHER MANIFESTATIONS FLUCTUATE (H): Primary | ICD-10-CM

## 2025-01-27 DIAGNOSIS — R25.8 BRADYKINESIA: ICD-10-CM

## 2025-01-27 PROCEDURE — 90853 GROUP PSYCHOTHERAPY: CPT

## 2025-01-27 PROCEDURE — 97110 THERAPEUTIC EXERCISES: CPT | Mod: GP | Performed by: PHYSICAL THERAPIST

## 2025-01-27 PROCEDURE — 97112 NEUROMUSCULAR REEDUCATION: CPT | Mod: GP | Performed by: PHYSICAL THERAPIST

## 2025-01-27 PROCEDURE — 90853 GROUP PSYCHOTHERAPY: CPT | Performed by: SOCIAL WORKER

## 2025-01-27 NOTE — PROGRESS NOTES
"Individualized Treatment Plan       Patient's Name: Fabiana Hu   Preferred Name: Fabiana  Pronouns:  She/Her   :   1960  MRN:   [id number]    Program Admission Date: 2025     Estimated Program Discharge Date: 3/24/2025   (Please note, this date is subject to change based on needs and progress toward identified goals).    Date of Initial Treatment Plan: 2025    Chief Concern: I am seeking treatment in this program because: \"I need help with depression and anxiety\"       Contributing Factors:    Symptoms/Difficulties:  Persistent sadness or depression , Extreme anxiety, fear, or panic , and Loss of interest in activities   Social withdrawal or isolation   Sleep disturbances (insomnia or excessive sleep)   Negative thinking patterns (e.g., catastrophizing, all-or-nothing)     Impacts On My Daily Life:   Safety: Denies safety concerns, including SI and SIB \"reduce negative thinking\"    Wellness: sleep and physical activity/movement \"I have a consistent bedtime routine because of my medications\"; \"Walking\"    Daily Life Activities: home management       Treatment Goal(s):   My main goal for treatment in this program is: \"When I am no longer afraid thinking about upcoming social engagements\"    Secondary and Supportive Goals (optional):  N/A    I will know I'm making progress in my goal when: \"I'm not obsessing about the past or worried about the future\"    I think I will be ready to discharge when: \"I'm not obsessing about the past or worried about the future\"    Cultural Values and Needs: [patient left blank]    Staff will assist by:  Staff will assist by complete C-SSRS as needed, review and update safety plan, provide crisis resources, and continue to follow up with patient about discharge resources and supports   Staff will assist by help patients identify priority tasks and provide pschoeducation on CBT and DBT coping skills.   Staff will assist by assist patient in task breakdown and provide " "community resources to help patient expand support system    Progress updates:  2/5/2025 Fabiana reports going to [employer] with a friend and attending a music concert that her  was playing in (\"it went fine\"). She is focusing on self-soothe and engaging in leisure.  Next steps: \"work up to bigger events. Get myself to a movie\".  2/17/2025: Steps taken: \"I've been much better\" with social anxiety. She adds, \"I am able to go to [employer] some days, I need my  with me though.  I was able to go out with friends to a concern, I went to the movies\". She also reports completing some household chores (dishes, washing floor). She reported on barriers: \"I am obsessing about the past and worrying about the future\".  Next step: continue with gratitude journal and affirming herself in the present.  3/10/2025 Steps taken: \"I've gone to group. Some days I don't feel like it\". She reflected on past social connection achievements (listed above). She adds, \"I'm monitoring my sleep and starting to track that.  I use mindfulness and affirmations to help sleep\".  Next steps: \"Revisit my social connection goal.  Schedule a walk with my friend\". She reports wanting to try reflecting on daily goals in the evening when her anxiety is lower.  3/24/2025 Goal stopped due to discharge. Steps taken: \"I met with my therapist, I scheduled a walk with a friend, I went to the store with my daughter which was challenging.  I went out to dinner with friends, it was too long but appreciated\".  She reports significantly reduced social anxiety with ongoing moderate depression.  Next steps: \"continue to work on strategies, talk to my sister more for support\".    Aftercare Plan:  return to individual therapy, outpatient psychiatry/medication management  Per discharge summary:   Take medications as prescribed.  Medication Management: Provider: Dr. Vega, [facility]   Next Appointment: Monday 3/24     Follow up with your providers and " appointments.  Next Level of Care / Frequency:   Individual Therapist: Licha Malloy [facility]  Next appointment: Tuesday 3/25     Fabiana will start a community education cooking class     Fabiana will attend 1x month Parkinson support groups - one private and one run by Kaleb      Fabiana has started the orientation process to volunteer at [facility]     Patient was referred to the [facility] in   [facility] (Tuesdays 2-4pm, [address], [address], [place], [place], Accepts Medicare, Mixed Age Group, Hybrid (in person or online)  - Call [facility] Intake to be placed on the waitlist and/or schedule [phone]    My Strengths:  Per DA: commitment to health and well being, exercise routine, friends / good social support, family support, intelligence, and motivation.     My Limitations or Vulnerabilities:  Per DA: few friends and lack of social support.     My Other Health Issues:  Per DA: Patient reports no current medical and/or dental concerns.  Patient denies any issues with pain..   There are significant appetite / nutritional concerns / weight changes.   Patient does not report a history of head injury / trauma / cognitive impairment.      Supports:    I want to include the following support people in my treatment: Per DA: Patient identified siblings, adult children, friends, and spouse as part of their support system.  Patient identified the quality of these relationships as good.   (Please note we cannot share information with anyone unless you sign a release of information. You can specify what information can and cannot be shared and you can retract that written permission at any time).    Assessments: The following assessments were completed by patient for this visit:  PHQ9:       11/28/2023     8:46 AM 6/18/2024    10:12 AM 1/7/2025    10:11 AM 1/21/2025     3:38 PM 1/24/2025     3:00 PM   PHQ-9 SCORE   PHQ-9 Total Score MyChart   13  (Moderate depression) 15 (Moderately severe depression)    PHQ-9 Total Score 0 2 13  15  10       Patient-reported     GAD7:       1/22/2025    12:00 PM 1/24/2025     3:00 PM   DAVID-7 SCORE   Total Score 11 5        Diagnosis/es:  296.32 (F33.1) Major Depressive Disorder, Recurrent Episode, Moderate _ and With anxious distress  300.02 (F41.1) Generalized Anxiety Disorder.     Level of Care: Adult Mental Health Outpatient Programs    Program Track: IOP/DT 3 55+ [place]    Multidisciplinary Team Members: Team IOP/DT 3:  Darryl BRANTLEY, DNP, CNP, APRN, PMHNP-BC; Brandt MCRAE MD; Darling BRANTLEY LICSW; Mallory WHITLEY LICSW; Althea LOPEZ RN-BSN; Chris BOSS, OTR/L    Program services: Group and Individual Psychotherapy (at least 1 hour per day), Patient Education and Training Related to Treatment (at least one hour per day), Psychiatric Evaluation and Management (at intake and least once per month)    Program Interventions:  Assist to identify treatment goals, including identifying and problem-solving barriers. Assist in identifying strengths and how to mobilize them in meeting treatment goals. Assist in identifying limitations and other health concerns, and ways to manage those in the recovery process and beyond. Assist in identifying and helping to establish, maintain, and strengthen support network. Assist with and facilitate discharge planning, including connection with available and appropriate community services.      Ongoing psychotherapeutic and multidisciplinary assessment. Regular meetings with psychiatrist or other independent psychiatric provider. Assessment by registered nurse liaisons at admission and ongoing/as needed. Complete OT assessment where applicable/as needed. Complete functional assessment(s) where applicable/as needed.    Plan for and help with maintaining safety, including revising and updating written safety plan where applicable. Assist in identifying and applying coping skills. Assist in identifying and problem  solving barriers to treatment and clinical progress. Utilize motivational interviewing techniques to promote change. Provide education to promote informed decisions and decision-making. Utilize motivational interviewing techniques to promote change.     Provide education regarding how to effectively use group process. Teach skills to help identify and manage thoughts, behaviors, and emotions, with specific skills/topics including: emotional regulation, identification of values, understanding and modifying distorted thinking, understanding and coping with grief and loss, shame, self-compassion, self-awareness, mindfulness, radical acceptance, distress tolerance skills, hope, problem-solving, communication, interpersonal effectiveness, distress tolerance. Facilitate increased self-awareness.       Provide education regarding: life balance/structure/routine, goal setting and integration, prioritizing and planning, leisure values, behavior activation, motivation, energy management, stress management, neuroscience of change, sensory modulation, window of tolerance, ANS and vagus nerve activation, sensory enhanced mindfulness, sensory/body based grounding skills.    Provide education regarding: eight dimensions of wellness, sleep hygiene, medication education and management, stigma, nutrition, signs and symptoms, community resources, support network education.     Abuse Prevention Plan:   Treatment team will provide education regarding skill development to address symptom management, life skills, wellness, discharge planning, and personal safety.  Collaborate with patients internal and external providers to coordinate care.  Treatment will be provided in a safe, therapeutic environment.  Program provider will offer medication adjustment/management as needed/indicated.   Program will monitor for use of substances.    Patient Participation in Plan:  This plan was developed by the patient named at the top of the document  "with assistance from the multidisciplinary care team. The patient agrees with this care plan, worked with staff to develop goals, and has received a copy. Supports and/or family have been invited to participate in the creation of this plan at the discretion of the patient.     Discharge Criteria:   Patient will discharge from the program when the goal(s) above have been met, the patient is otherwise deemed to no longer need and/or benefit from the program/this level of care, another treatment modality is identified that would better meet treatment needs and goals, and/or the patient opts to discharge from the program for any reason.    Acknowledgement of Current Treatment Plan   I have reviewed my treatment plan with my treatment team members.  I agree with the plan as it is written in the electronic health record.     Name:                   Signature:                                                          Date:  Fabiana Hu Patient signature page and treatment plan worksheet in media tab   1/27/2025   Brandt Campos MD  Psychiatrist/Medical Director See above 1/27/2025   NOTE: Patient signatures are completed manually and scanned into the electronic medical record. See \"Media\" tab in Epic.     "

## 2025-01-27 NOTE — PROGRESS NOTES
Intensive Outpatient Program   Physician Certification of Medical Necessity    Patient Name: Fabiana Hu  Patient Preferred Name: Fabiana Lopez : 1960  Patient MRN: [id number]    Attending physician: Brandt Campos MD      Admission Certification:    I certify the above-named patient would require partial hospitalization care if intensive outpatient services were not provided and that the patient requires such IOP services for a minimum of 9 hours per week. These services are provided under the care and supervision of a physician and under an individualized plan of treatment that is established and periodically reviewed by a physician in consultation with appropriate staff participating in the program.    From admission date: 2025 to 60 day: 3/24/2025    Brandt Campos MD on 2025 at 3:18 PM

## 2025-01-27 NOTE — GROUP NOTE
Psychotherapy Group Note    PATIENT'S NAME: Fabiana Hu  MRN:   [id number]  :   1960  ACCT. NUMBER: [id number]  DATE OF SERVICE: 25  START TIME:  2:00 PM  END TIME:  2:50 PM  FACILITATOR: Darling Leiva LICSW  TOPIC: MH EBP Group: Self-Awareness  [facility] Adult Mental Health Outpatient Programs  TRACK: IOP/DT 3 55+    NUMBER OF PARTICIPANTS: 4    Summary of Group / Topics Discussed:  Self-Awareness: Self-Compassion: Patients received overview of key concepts in developing self-compassion. Patients discussed mindfulness, self-kindness, and finding common humanity. Patients identified their current approach to problems in their lives and learned skills for increasing self-compassion. Patients identified ways they can put self-compassion skills into practice and problem solve barriers to application of skills.     Patient Session Goals / Objectives:  [place] components of self-compassion  Identify ways to practice self-compassion in daily life  Problem solve barriers to self-compassion practice      Patient Participation / Response:  Fully participated with the group by sharing personal reflections / insights and openly received / provided feedback with other participants.    Demonstrated understanding of topics discussed through group discussion and participation, Demonstrated understanding of values, strengths, and challenges to learn about themselves, Identified / Expressed readiness to act intentionally, increase self-compassion, promote personal growth, Verbalized understanding of ways to proactively manage illness, and Identified plan to address barriers to practicing skills that promote self-awareness     Treatment Plan:  Patient has a current master individualized treatment plan.  See Epic treatment plan for more information.    WENDY Currie

## 2025-01-27 NOTE — GROUP NOTE
Psychoeducation Group Note    PATIENT'S NAME: Fabiana Hu  MRN:   [id number]  :   1960  ACCT. NUMBER: [id number]  DATE OF SERVICE: 25  START TIME:  3:00 PM  END TIME:  3:50 PM  FACILITATOR: Mallory Sanchez LICSW; Althea Murcia RN  TOPIC:  Wellness Group: Mind/Body Practice & Complementary  [facility] Adult Mental Health Outpatient Programs  TRACK: IOP/DT 3    NUMBER OF PARTICIPANTS: 4    Summary of Group / Topics Discussed:  Mind/Body Practice & Complementary Therapies:  Progressive Muscle Relaxation: In addition to affecting our mood and behavior, psychological stress can cause a myriad of physical symptoms in our body. Patients were educated on these effects and guided to increased self-awareness of how stress affects their body. The purpose, benefits, history, and techniques of progressive muscle relaxation were discussed. In an instructor guided experiential, patients were guided to practice PMR to help reduce physical symptoms of psychological stress and achieve a more balanced feeling of well-being.    Patient Session Goals / Objectives:  Identified physiological symptoms of stress on the body  Listed & Explained the purpose and benefits to practicing PMR   Practiced progressive muscle relaxation experiential      Patient Participation / Response:  Fully participated with the group by sharing personal reflections / insights and openly received / provided feedback with other participants.    Demonstrated understanding of topics discussed through group discussion and participation, Identified / Expressed personal readiness to practice skills, and Verbalized understanding of Mind/Body Practice & Complementary Therapies topic    Treatment Plan:  Patient has a current master individualized treatment plan.  See Epic treatment plan for more information.    WENDY Mcdermott

## 2025-01-28 ENCOUNTER — TELEPHONE (OUTPATIENT)
Dept: BEHAVIORAL HEALTH | Facility: CLINIC | Age: 65
End: 2025-01-28
Payer: MEDICARE

## 2025-01-28 NOTE — GROUP NOTE
"Process Group Note    PATIENT'S NAME: Fabiana Hu  MRN:   [id number]  :   1960  ACCT. NUMBER: [id number]  DATE OF SERVICE: 25  START TIME:  1:00 PM  END TIME:  1:50 PM  FACILITATOR: Darling Leiva LICSW  TOPIC:  Process Group    Diagnoses:  296.32 (F33.1) Major Depressive Disorder, Recurrent Episode, Moderate _ and With anxious distress  300.02 (F41.1) Generalized Anxiety Disorder.     [facility] Adult Mental Health Outpatient Programs  TRACK: IOP/DT 3 55+    NUMBER OF PARTICIPANTS: 4        Data:    Session content: At the start of this group, patients were invited to check in by identifying themselves, describing their current emotional status, and identifying issues to address in this group.   Area(s) of treatment focus addressed in this session included Symptom Management, Personal Safety, and Community Resources/Discharge Planning.  Reported mood is \"anxious, confused\".  She talked about social anxiety and her fear of judgment from others.  She struggled to get to [employer] yesterday and eventually went when a friend offered to go with.  Client denied safety concerns, including SI and SIB. Client denies any chemical use.  Client is taking medications as prescribed. Client's goal is \"increase my social engagement\".  Client reported plans to use the following coping skills: ask peers for feedback, grounding.  Writer offered additional cognitive behavioral strategies. Client is grateful for walking outside yesterday. Peers offered supportive feedback and validation.    Writer met with patient on a break to review treatment goals and assist with an individual therapy referral.        2025     1:00 PM   Suicide Ideation Check In   Since last session, how often have you had suicidal thoughts? No thoughts of suicide           Therapeutic Interventions/Treatment Strategies:  Psychotherapist offered support, feedback and validation and reinforced use of skills. Treatment modalities used include " Cognitive Behavioral Therapy and Dialectical Behavioral Therapy. Interventions include Coping Skills: Promoted understanding of how and when to apply grounding strategies to reduce distress and increase presence in the moment and Emotions Management:  Discussed barriers to emotional regulation.    Assessment:    Patient response:   Patient responded to session by accepting feedback, giving feedback, listening, focusing on goals, being attentive, and accepting support    Possible barriers to participation / learning include: and no barriers identified    Health Issues:   None reported       Substance Use Review:   Substance Use: No active concerns identified.    Mental Status/Behavioral Observations  Appearance:   Appropriate   Eye Contact:   Good   Psychomotor Behavior: Normal   Attitude:   Cooperative  Interested Friendly Pleasant  Orientation:   All  Speech   Rate / Production: Normal    Volume:  Normal   Mood:    Anxious  Confused  Affect:    Worrisome   Thought Content:   Rumination and Safety denies any current safety concerns including suicidal ideation, self-harm, and homicidal ideation  Thought Form:  Coherent  Logical     Insight:    Good     Plan:   Safety Plan: No current safety concerns identified.  Recommended that patient call 911 or go to the local ED should there be a change in any of these risk factors.   Barriers to treatment: None identified  Patient Contracts (see media tab):  None  Substance Use: Not addressed in session   Continue or Discharge: Patient will continue in 55+ Program (55+) as planned. Patient is likely to benefit from learning and using skills as they work toward the goals identified in their treatment plan.      Darling Leiva, [facility] HospitalSW  January 28, 2025

## 2025-01-29 ENCOUNTER — HOSPITAL ENCOUNTER (OUTPATIENT)
Dept: BEHAVIORAL HEALTH | Facility: CLINIC | Age: 65
End: 2025-01-29
Attending: PSYCHIATRY & NEUROLOGY
Payer: MEDICARE

## 2025-01-29 DIAGNOSIS — F41.1 GENERALIZED ANXIETY DISORDER: Primary | ICD-10-CM

## 2025-01-29 PROCEDURE — 90853 GROUP PSYCHOTHERAPY: CPT

## 2025-01-29 PROCEDURE — 90853 GROUP PSYCHOTHERAPY: CPT | Performed by: COUNSELOR

## 2025-01-29 PROCEDURE — 90853 GROUP PSYCHOTHERAPY: CPT | Performed by: SOCIAL WORKER

## 2025-01-29 NOTE — GROUP NOTE
"Process Group Note    PATIENT'S NAME: Fabiana Hu  MRN:   [id number]  :   1960  ACCT. NUMBER: [id number]  DATE OF SERVICE: 25  START TIME:  1:00 PM  END TIME:  1:50 PM  FACILITATOR: Darling Leiva LICSW  TOPIC:  Process Group    Diagnoses:  296.32 (F33.1) Major Depressive Disorder, Recurrent Episode, Moderate _ and With anxious distress  300.02 (F41.1) Generalized Anxiety Disorder.     [facility] Adult Mental Health Outpatient Programs  TRACK: IOP/DT 3 55+    NUMBER OF PARTICIPANTS: 6        Data:    Session content: At the start of this group, patients were invited to check in by identifying themselves, describing their current emotional status, and identifying issues to address in this group.   Area(s) of treatment focus addressed in this session included Symptom Management, Personal Safety, and Community Resources/Discharge Planning.  Reported mood is anxious.   Client denied safety concerns, including SI and SIB. Client denies any chemical use.  Client is taking medications as prescribed. Client's goal is \"social engagement\".  She was invited to her father in law's for dinner but feels too anxious to join.  Writer reviewed the opposite action skill and self-compassion strategies.  She was leaning towards not attending until she had more coping skills. Peers offered supportive feedback and validation.        2025     2:00 PM   Suicide Ideation Check In   Since last session, how often have you had suicidal thoughts? No thoughts of suicide             Therapeutic Interventions/Treatment Strategies:  Psychotherapist offered support, feedback and validation and reinforced use of skills. Treatment modalities used include Cognitive Behavioral Therapy and Dialectical Behavioral Therapy. Interventions include Emotions Management:  Discussed barriers to emotional regulation and Reviewed opposite action skill.    Assessment:    Patient response:   Patient responded to session by accepting feedback, " giving feedback, listening, focusing on goals, being attentive, and accepting support    Possible barriers to participation / learning include: and no barriers identified    Health Issues:   None reported       Substance Use Review:   Substance Use: No active concerns identified.    Mental Status/Behavioral Observations  Appearance:   Appropriate   Eye Contact:   Good   Psychomotor Behavior: Normal   Attitude:   Cooperative  Interested Friendly Pleasant  Orientation:   All  Speech   Rate / Production: Normal    Volume:  Normal   Mood:    Anxious   Affect:    Worrisome   Thought Content:   Clear and Safety denies any current safety concerns including suicidal ideation, self-harm, and homicidal ideation  Thought Form:  Coherent  Logical     Insight:    Good     Plan:   Safety Plan: No current safety concerns identified.  Recommended that patient call 911 or go to the local ED should there be a change in any of these risk factors.   Barriers to treatment: None identified  Patient Contracts (see media tab):  None  Substance Use: Not addressed in session   Continue or Discharge: Patient will continue in 55+ Program (55+) as planned. Patient is likely to benefit from learning and using skills as they work toward the goals identified in their treatment plan.      Darling Leiva, [facility] CenterLEYLA  January 29, 2025

## 2025-01-29 NOTE — GROUP NOTE
"Psychoeducation Group Note    PATIENT'S NAME: Fabiana Hu  MRN:   [id number]  :   1960  ACCT. NUMBER: [id number]  DATE OF SERVICE: 25  START TIME:  3:00 PM  END TIME:  3:50 PM  FACILITATOR: Mallory Sanchez LICSW; Chris Rowe OTR  TOPIC: MH Life Skills Group: Resiliency Development  [facility]  TRACK: IOP/DT 3    NUMBER OF PARTICIPANTS: 6    Summary of Group / Topics Discussed:  Resiliency Development:  Resiliency Development: Occupational Gifts: Patients were given the opportunity to reflect on and identify different types of occupations (activities) they participate in to maintain and/or build resilience in their lives.  Patients were taught about how \"doing\" promotes mental health recovery by providing routine and structure, empowerment, sense of self, and connectedness.  Patients identified occupations (activities) that promote mental health: connection, centering, creation, contemplation, and contribution.  Patients were also given the opportunity to improve self-efficacy, self-sufficiency, quality of life and a sense of mastery and competency by identifying and exploring positive activities they participate in their life. Validation, support, and feedback were provided from staff throughout group.       Patient Session Goals / Objectives:   Identified occupations (activities) they can use to promote mental health recovery and to effectively manage mental health symptoms.  Improved awareness of positive qualities of occupations they currently participate in and new occupations they might try and how this contributes to the process of recovery and mental health wellbeing and resiliency.   Established a plan for practice of these skills in their own environments.   Practiced and reflected on how to generalize taught skills to their everyday life.       Patient Participation / Response:  Fully participated with the group by sharing personal reflections " / insights and openly received / provided feedback with other participants.    Verbalized understanding of content    Treatment Plan:  Patient has a current master individualized treatment plan.  See Epic treatment plan for more information.    Mallory Sanchez, KIRKSW

## 2025-01-30 NOTE — GROUP NOTE
Psychotherapy Group Note    PATIENT'S NAME: Fabiana Hu  MRN:   [id number]  :   1960  ACCT. NUMBER: [id number]  DATE OF SERVICE: 25  START TIME:  2:00 PM  END TIME:  2:50 PM  FACILITATOR: Reuben Qureshi LMFT  TOPIC:  EBP Group: [facility]  TRACK: IOPDT3    NUMBER OF PARTICIPANTS: 5    Summary of Group / Topics Discussed:  Mindfulness: What is Mindfulness: Patients received an overview on what mindfulness is and how mindfulness can benefit general health, mental health symptoms, and stressors. The history of mindfulness, its application to mental health therapies, and key concepts were also discussed. Patients discussed current awareness, knowledge, and practice of mindfulness skills. Patients also discussed barriers to mindfulness practice.     Patient Session Goals / Objectives:  Demonstrated understanding of key concepts and application to daily life  Identified when/how to use mindfulness   Resolved barriers to practice  Identified plan to use mindfulness in daily life      Patient Participation / Response:  Fully participated with the group by sharing personal reflections / insights and openly received / provided feedback with other participants.    Demonstrated understanding of topics discussed through group discussion and participation, Demonstrated understanding of mindfulness skills and benefits of practice, Identified / Expressed personal readiness to practice mindfulness skills, Verbalized understanding of how mindfulness can benefit mental health symptoms, and Practiced skills in session    Treatment Plan:  Patient has a current master individualized treatment plan.  See Epic treatment plan for more information.    DORA Strong

## 2025-01-31 ENCOUNTER — HOSPITAL ENCOUNTER (OUTPATIENT)
Dept: BEHAVIORAL HEALTH | Facility: CLINIC | Age: 65
Discharge: HOME | End: 2025-01-31
Attending: PSYCHIATRY & NEUROLOGY
Payer: MEDICARE

## 2025-01-31 DIAGNOSIS — F41.1 GENERALIZED ANXIETY DISORDER: Primary | ICD-10-CM

## 2025-01-31 PROCEDURE — 90853 GROUP PSYCHOTHERAPY: CPT

## 2025-01-31 PROCEDURE — 90853 GROUP PSYCHOTHERAPY: CPT | Performed by: SOCIAL WORKER

## 2025-01-31 NOTE — GROUP NOTE
"Process Group Note    PATIENT'S NAME: Fabiana Hu  MRN:   [id number]  :   1960  ACCT. NUMBER: [id number]  DATE OF SERVICE: 25  START TIME:  1:00 PM  END TIME:  1:50 PM  FACILITATOR: Darling Leiva LICSW  TOPIC:  Process Group    Diagnoses:  296.32 (F33.1) Major Depressive Disorder, Recurrent Episode, Moderate _ and With anxious distress  300.02 (F41.1) Generalized Anxiety Disorder.     [facility]  TRACK: IOP/DT 3 55+    NUMBER OF PARTICIPANTS: 6        Data:    Session content: At the start of this group, patients were invited to check in by identifying themselves, describing their current emotional status, and identifying issues to address in this group.   Area(s) of treatment focus addressed in this session included Symptom Management, Personal Safety, and Community Resources/Discharge Planning.  Fabiana started her check in by reporting she attended her 's music concert last night and that it \"went well\". Reported mood is \"today it started down\".  She noted anxious rumination about \"regrets\".   Client denied safety concerns, including SI and SIB. Client denies any chemical use.  Client is taking medications as prescribed. Client reported plans to use the following coping skills: gratitude list, mindfulness. Client asked peers about CBT strategies. Writer offered to gather a list of CBT workbooks. Peers offered supportive feedback and validation.        2025     7:00 AM   Suicide Ideation Check In   Since last session, how often have you had suicidal thoughts? No thoughts of suicide             Therapeutic Interventions/Treatment Strategies:  Psychotherapist offered support, feedback and validation and reinforced use of skills. Treatment modalities used include Cognitive Behavioral Therapy and Dialectical Behavioral Therapy. Interventions include Cognitive Restructuring:  Explored impact of ineffective thoughts / distortions on mood and activity " and Assisted patient in formulating new neutral/positive alternatives to challenge less helpful / ineffective thoughts and Mindfulness: Facilitated discussion of when/how to use mindfulness skills to benefit general health, mental health symptoms, and stressors.    Assessment:    Patient response:   Patient responded to session by accepting feedback, giving feedback, listening, focusing on goals, being attentive, and accepting support    Possible barriers to participation / learning include: and no barriers identified    Health Issues:   None reported       Substance Use Review:   Substance Use: No active concerns identified.    Mental Status/Behavioral Observations  Appearance:   Appropriate   Eye Contact:   Good   Psychomotor Behavior: Normal   Attitude:   Cooperative  Interested Friendly Pleasant  Orientation:   All  Speech   Rate / Production: Normal    Volume:  Normal   Mood:    Anxious  Depressed   Affect:    Appropriate   Thought Content:   Rumination and Safety denies any current safety concerns including suicidal ideation, self-harm, and homicidal ideation  Thought Form:  Coherent  Logical     Insight:    Good     Plan:   Safety Plan: No current safety concerns identified.  Recommended that patient call 911 or go to the local ED should there be a change in any of these risk factors.   Barriers to treatment: None identified  Patient Contracts (see media tab):  None  Substance Use: Not addressed in session   Continue or Discharge: Patient will continue in 55+ Program (55+) as planned. Patient is likely to benefit from learning and using skills as they work toward the goals identified in their treatment plan.      WENDY Currie  January 31, 2025

## 2025-01-31 NOTE — GROUP NOTE
Psychotherapy Group Note    PATIENT'S NAME: Fabiana Hu  MRN:   [id number]  :   1960  ACCT. NUMBER: [id number]  DATE OF SERVICE: 25  START TIME:  2:00 PM  END TIME:  2:50 PM  FACILITATOR: Darling Leiva LICSW  TOPIC: MH EBP Group: Self-Awareness  [facility]  TRACK: IOP/DT 3 55+    NUMBER OF PARTICIPANTS: 6    Summary of Group / Topics Discussed:  Self-Awareness: Gratitude: Topic focused on assisting patients in identifying key concepts in gratitude. Patients discussed the benefits of practicing gratitude and its impact on mood improvement, mindfulness, and perspective. Patients worked to increase time spent on recognition and appreciation of what is positive and working in their lives. Patients discussed the concepts and benefits of feeling grateful. The goal is to reduce rumination and negative thinking resulting in increased mindfulness and resilience. Patients specifically discussed how they can practice and problem solve barriers to daily gratitude practice.     Patient Session Goals / Objectives:  [place] the concept and benefits of gratitude  Identified ways to practice gratitude in daily life  Problem solved barriers to practicing gratitude      Patient Participation / Response:  Fully participated with the group by sharing personal reflections / insights and openly received / provided feedback with other participants.    Demonstrated understanding of topics discussed through group discussion and participation, Demonstrated understanding of values, strengths, and challenges to learn about themselves, Identified / Expressed readiness to act intentionally, increase self-compassion, promote personal growth, Verbalized understanding of ways to proactively manage illness, and Identified plan to address barriers to practicing skills that promote self-awareness     Treatment Plan:  Patient has a current master individualized treatment plan.  See Epic  treatment plan for more information.    KIRK CurrieSW

## 2025-01-31 NOTE — GROUP NOTE
Psychoeducation Group Note    PATIENT'S NAME: Fabiana Hu  MRN:   [id number]  :   1960  ACCT. NUMBER: [id number]  DATE OF SERVICE: 25  START TIME:  3:00 PM  END TIME:  3:50 PM  FACILITATOR: Mallory Sanchez LICSW; Chris Rowe OTR  TOPIC: MH Life Skills Group: Resiliency Development  [facility]  TRACK: IOP/DT 3    NUMBER OF PARTICIPANTS: 6    Summary of Group / Topics Discussed:  Resiliency Development:  Resiliency Development: Occupational Gifts Experiential: Patients were given the opportunity to engage in an activity that promotes resilience, connection, and empowerment throughout their daily life. Patients independently identified an activity that will contribute to their mental health recovery and promote self-efficacy and a sense of mastery. Patients discussed with peers the impact of the activity on their stress and anxiety levels. Validation, support, and guidance were provided throughout group.        Patient Session Goals / Objectives:   Established a plan for practice of these skills in their own environments.   Practiced and reflected on how to generalize taught skills to their everyday life.   Independently engage in an occupation that promotes mental health recovery.       Patient Participation / Response:  Fully participated with the group by sharing personal reflections / insights and openly received / provided feedback with other participants.    Verbalized understanding of content    Treatment Plan:  Patient has a current master individualized treatment plan.  See Epic treatment plan for more information.    WENDY Mcdermott

## 2025-02-03 ENCOUNTER — HOSPITAL ENCOUNTER (OUTPATIENT)
Dept: BEHAVIORAL HEALTH | Facility: CLINIC | Age: 65
End: 2025-02-03
Attending: PSYCHIATRY & NEUROLOGY
Payer: MEDICARE

## 2025-02-03 ENCOUNTER — THERAPY VISIT (OUTPATIENT)
Dept: PHYSICAL THERAPY | Facility: REHABILITATION | Age: 65
End: 2025-02-03
Payer: MEDICARE

## 2025-02-03 DIAGNOSIS — G20.A1 PARKINSON'S DISEASE, UNSPECIFIED WHETHER DYSKINESIA PRESENT, UNSPECIFIED WHETHER MANIFESTATIONS FLUCTUATE (H): Primary | ICD-10-CM

## 2025-02-03 DIAGNOSIS — F41.1 GENERALIZED ANXIETY DISORDER: Primary | ICD-10-CM

## 2025-02-03 PROCEDURE — 90853 GROUP PSYCHOTHERAPY: CPT | Performed by: SOCIAL WORKER

## 2025-02-03 PROCEDURE — 90853 GROUP PSYCHOTHERAPY: CPT

## 2025-02-03 PROCEDURE — 97112 NEUROMUSCULAR REEDUCATION: CPT | Mod: GP | Performed by: PHYSICAL THERAPIST

## 2025-02-03 PROCEDURE — 97110 THERAPEUTIC EXERCISES: CPT | Mod: GP | Performed by: PHYSICAL THERAPIST

## 2025-02-03 NOTE — GROUP NOTE
"Process Group Note    PATIENT'S NAME: Fabiana Hu  MRN:   [id number]  :   1960  ACCT. NUMBER: [id number]  DATE OF SERVICE: 25  START TIME:  1:00 PM  END TIME:  1:50 PM  FACILITATOR: Darling Leiva LICSW  TOPIC:  Process Group    Diagnoses:  296.32 (F33.1) Major Depressive Disorder, Recurrent Episode, Moderate _ and With anxious distress  300.02 (F41.1) Generalized Anxiety Disorder.     [facility]  TRACK: IOP/DT 3 55+    NUMBER OF PARTICIPANTS: 5        Data:    Session content: At the start of this group, patients were invited to check in by identifying themselves, describing their current emotional status, and identifying issues to address in this group.   Area(s) of treatment focus addressed in this session included Symptom Management, Personal Safety, and Community Resources/Discharge Planning.  Reported mood is \"down, I woke up afraid of the day\".  She reports finding group therapy \"intimidating\".  She appreciates support of peers.  She accepted help from her  this morning on \"finding ways to bring tino into my day\".  She brainstormed eating dark chocolate and watching her bird feeder.   Client denied safety concerns, including SI and SIB. Client denies any chemical use.  Client is taking medications as prescribed. Client reported plans to use the following coping skills: self-soothe, mindfulness. Client is proud of herself for cleaning the fridge. Peers offered supportive feedback and validation.        2/3/2025     2:00 PM   Suicide Ideation Check In   Since last session, how often have you had suicidal thoughts? No thoughts of suicide         Therapeutic Interventions/Treatment Strategies:  Psychotherapist offered support, feedback and validation and reinforced use of skills. Treatment modalities used include Cognitive Behavioral Therapy and Dialectical Behavioral Therapy. Interventions include Coping Skills: Discussed use of self-soothe skills " to decrease distress in the body and Assisted patient in understanding the purpose of planning / creating / participating / sharing in positive experiences and Mindfulness: Facilitated discussion of when/how to use mindfulness skills to benefit general health, mental health symptoms, and stressors.    Assessment:    Patient response:   Patient responded to session by accepting feedback, giving feedback, listening, focusing on goals, being attentive, and accepting support    Possible barriers to participation / learning include: and no barriers identified    Health Issues:   None reported       Substance Use Review:   Substance Use: No active concerns identified.    Mental Status/Behavioral Observations  Appearance:   Appropriate   Eye Contact:   Good   Psychomotor Behavior: Normal   Attitude:   Cooperative  Interested Friendly Pleasant  Orientation:   All  Speech   Rate / Production: Normal    Volume:  Normal   Mood:    Anxious  Depressed   Affect:    Appropriate   Thought Content:   Rumination and Safety denies any current safety concerns including suicidal ideation, self-harm, and homicidal ideation  Thought Form:  Coherent  Logical     Insight:    Good     Plan:   Safety Plan: No current safety concerns identified.  Recommended that patient call 911 or go to the local ED should there be a change in any of these risk factors.   Barriers to treatment: None identified  Patient Contracts (see media tab):  None  Substance Use: Not addressed in session   Continue or Discharge: Patient will continue in 55+ Program (55+) as planned. Patient is likely to benefit from learning and using skills as they work toward the goals identified in their treatment plan.      Darling Leiva, KIRKSW  February 3, 2025

## 2025-02-03 NOTE — GROUP NOTE
Psychotherapy Group Note    PATIENT'S NAME: Fabiana Hu  MRN:   [id number]  :   1960  ACCT. NUMBER: [id number]  DATE OF SERVICE: 25  START TIME:  2:00 PM  END TIME:  2:50 PM  FACILITATOR: Laura Franz LICSW  TOPIC:  EBP Group: Emotions Management  [facility]  TRACK: IOP 3 55+    NUMBER OF PARTICIPANTS: 5    Summary of Group / Topics Discussed:  Emotions Management: Model of Emotions: Patients were introduced to the cyclical model of emotions.  Explored emotions are shaped by different events and one s interpretation of events.  Group discussed how emotions begin with an event, followed by one s interpretation, followed by associated feelings.  Discussion included a review of personal urges and actions that can/do follow an emotional experience in the patient s life, and the end results.    Patient Session Goals / Objectives:  Demonstrate understanding of types various emotions.  Identify and discuss specific emotions and when they occur; understand triggers.  Identify individual emotions and physical sensations that accompany them.  Discuss urges and actions, and how to influence the intensity of emotional reactions and disrupt the cycle.    Discuss barriers to emotional regulation.  Choose 1-2 strategies to assist with emotional response to potentially distressing situations.      Patient Participation / Response:  Fully participated with the group by sharing personal reflections / insights and openly received / provided feedback with other participants.    Demonstrated understanding of topics discussed through group discussion and participation, Expressed understanding of the relevance / importance of emotions management skills at distressing times in life, Self-aware of experiences with difficult emotions, and strategies to employ to manage them, Demonstrated knowledge of when to consider applying a variety of emotions management skills in daily life,  and Demonstrated understanding and practice strategies to manage difficult emotions and move towards healing    Treatment Plan:  Patient has a current master individualized treatment plan.  See Epic treatment plan for more information.    Laura Franz, KIRKSW

## 2025-02-03 NOTE — GROUP NOTE
Psychoeducation Group Note    PATIENT'S NAME: Fabiana Hu  MRN:   [id number]  :   1960  ACCT. NUMBER: [id number]  DATE OF SERVICE: 25  START TIME:  3:00 PM  END TIME:  3:50 PM  FACILITATOR: Mallory Sanchez LICSW; Althea Murcia RN  TOPIC:  Wellness Group: [facility]  TRACK: IOP/DT 3    NUMBER OF PARTICIPANTS: 5    Summary of Group / Topics Discussed:  Foundations of Health: Sleep: Sleep hygiene: Patients explored the connection between sleep and mental illness. Patients learned about how adequate sleep can improve health, productivity, wellness, quality of life, and safety.     Patient Session Goals / Objectives:  Demonstrated understanding of sleep hygiene practices and benefits of sleep  Identified sleep hygiene strategies to utilize   Described the connection between sleep disturbances and mental illness  Education with [place]       Patient Participation / Response:  Fully participated with the group by sharing personal reflections / insights and openly received / provided feedback with other participants.    Demonstrated understanding of topics discussed through group discussion and participation and Verbalized understanding of foundations of health topic    Treatment Plan:  Patient has a current master individualized treatment plan.  See Epic treatment plan for more information.    WENDY Mcdermott

## 2025-02-04 ENCOUNTER — TELEPHONE (OUTPATIENT)
Dept: BEHAVIORAL HEALTH | Facility: CLINIC | Age: 65
End: 2025-02-04
Payer: MEDICARE

## 2025-02-04 ENCOUNTER — PATIENT OUTREACH (OUTPATIENT)
Dept: NEUROLOGY | Facility: CLINIC | Age: 65
End: 2025-02-04
Payer: MEDICARE

## 2025-02-04 NOTE — PROGRESS NOTES
Spoke to Fabiana and Brandt. Fabiana would like Asha involved in her visits and care as she has good questions to ask. Sister lives in [place]. Reviewed we can have her call her sister on WhatsApp at the beginning of the visit so she can hear/ask questions as things go along. Discussed with multiple people involved in the visit it is important for us to have Fabiana's opinion and questions answered as care is centered around her as the patient and decision maker. We will get an updated authorization to discuss form at the next visit per Fabiana's request.    They have no questions following the visit. She has contacted her sleep doctor regarding the expensive sleep medicine.

## 2025-02-05 ENCOUNTER — HOSPITAL ENCOUNTER (OUTPATIENT)
Dept: BEHAVIORAL HEALTH | Facility: CLINIC | Age: 65
End: 2025-02-05
Attending: PSYCHIATRY & NEUROLOGY
Payer: MEDICARE

## 2025-02-05 DIAGNOSIS — F41.1 GENERALIZED ANXIETY DISORDER: Primary | ICD-10-CM

## 2025-02-05 PROCEDURE — 90853 GROUP PSYCHOTHERAPY: CPT | Performed by: SOCIAL WORKER

## 2025-02-05 PROCEDURE — 90853 GROUP PSYCHOTHERAPY: CPT

## 2025-02-05 NOTE — GROUP NOTE
Psychoeducation Group Note    PATIENT'S NAME: Fabiana Hu  MRN:   [id number]  :   1960  ACCT. NUMBER: [id number]  DATE OF SERVICE: 25  START TIME:  3:00 PM  END TIME:  3:50 PM  FACILITATOR: Mallory Sanchez LICSW; Chris Rowe OTR  TOPIC: MH Life Skills Group: Resiliency Development  [facility] Out[facility]  TRACK: IOP/DT 3    NUMBER OF PARTICIPANTS: 6    Summary of Group / Topics Discussed:  Resiliency Development:  Resiliency Development: Coping Skills: Provided education on the benefits of exploring and identifying helpful coping skills to help manage distress and regulate emotions.  Discussed the importance of having a coping skills plan in times of increased symptoms.  Patients were given an opportunity to explore different types of coping skills (distraction, positive/reward, relaxation, mindfulness/grounding, safe ways to express feelings).  Patients self-reflected on coping skills that they could engage in their daily life and add into their daily routine.      Patient Session Goals / Objectives:   Review the benefits of having different types of coping skills to help manage distress and regulate emotions.   Explore different types of coping skills to promote self-regulation and independent skill use.   Established a plan for practice of these skills in their own environments.       Patient Participation / Response:  Fully participated with the group by sharing personal reflections / insights and openly received / provided feedback with other participants.    Verbalized understanding of content    Treatment Plan:  Patient has a current master individualized treatment plan.  See Epic treatment plan for more information.    WENDY Mcdermott

## 2025-02-05 NOTE — GROUP NOTE
Psychoeducation Group Note    PATIENT'S NAME: Fabiana Hu  MRN:   [id number]  :   1960  ACCT. NUMBER: [id number]  DATE OF SERVICE: 25  START TIME:  2:00 PM  END TIME:  2:50 PM  FACILITATOR: Darling Leiva LICSW  TOPIC: MH Life Skills Group: Lifestyle Balance and Structure  [facility] Out[facility]  TRACK: IOP/DT 3 55+    NUMBER OF PARTICIPANTS: 6    Summary of Group / Topics Discussed:  Lifestyle Balance and Strucure:  Goal-setting & integration: Patients were introduced to the process and benefits of setting realistic, timely, and achievable goals to help support their ability to follow through with meaningful personal, health, recovery and treatment goals that they would like to achieve to improve overall functioning.  Patients were also taught and then practiced techniques to manage a variety of obstacles to achieve their goals and how to break goals into manageable pieces.  Patients also reported on follow through of goals.     Patient Session Goals / Objectives:  Facilitated the creation of a vision for recovery and wellbeing  Identified and wrote meaningful SMART goals to engage in meaningful activities of daily living   Identified and problem solved barriers to achieving goals   Identified plan to support follow through on goals and reflection on progress made      Patient Participation / Response:  Fully participated with the group by sharing personal reflections / insights and openly received / provided feedback with other participants.    Verbalized understanding of content, Patient worked towards initial treatment plan goals , and Patient made progress towards meeting ITP goal number all goals    Treatment Plan:  Patient has a current master individualized treatment plan.  See Epic treatment plan for more information.    WENDY Currie

## 2025-02-05 NOTE — GROUP NOTE
"Process Group Note    PATIENT'S NAME: Fabiana Hu  MRN:   [id number]  :   1960  ACCT. NUMBER: [id number]  DATE OF SERVICE: 25  START TIME:  1:00 PM  END TIME:  1:50 PM  FACILITATOR: Darling Leiva LICSW  TOPIC:  Process Group    Diagnoses:  296.32 (F33.1) Major Depressive Disorder, Recurrent Episode, Moderate _ and With anxious distress  300.02 (F41.1) Generalized Anxiety Disorder.     [facility]  TRACK: IOP/DT 3 55+    NUMBER OF PARTICIPANTS: 6        Data:    Session content: At the start of this group, patients were invited to check in by identifying themselves, describing their current emotional status, and identifying issues to address in this group.   Area(s) of treatment focus addressed in this session included Symptom Management, Personal Safety, and Community Resources/Discharge Planning.  Reported mood is \"resentful and grateful\".  She arrived late to group after attending a dermatology appointment.  She reported learning she had a melanoma.   Client denied safety concerns, including SI and SIB. Client denies any chemical use.  Client is taking medications as prescribed. Client's goal is \"get through the day\".  Client reported plans to use the following coping skills: self-soothe, pain management,self-compassion, engage in leisure. Client is grateful for her  providing transportation to her appointments. Peers offered supportive feedback and validation.        2025     3:00 PM   Suicide Ideation Check In   Since last session, how often have you had suicidal thoughts? No thoughts of suicide, but wish I were dead         Therapeutic Interventions/Treatment Strategies:  Psychotherapist offered support, feedback and validation and reinforced use of skills. Treatment modalities used include Cognitive Behavioral Therapy and Dialectical Behavioral Therapy. Interventions include Coping Skills: Discussed use of self-soothe skills to decrease " "distress in the body, Discussed how the use of intentional \"in the moment\" actions can help reduce distress, and Assisted patient in understanding the purpose of planning / creating / participating / sharing in positive experiences.    Assessment:    Patient response:   Patient responded to session by accepting feedback, giving feedback, listening, focusing on goals, being attentive, and accepting support    Possible barriers to participation / learning include: and no barriers identified    Health Issues:   None reported       Substance Use Review:   Substance Use: No active concerns identified.    Mental Status/Behavioral Observations  Appearance:   Appropriate   Eye Contact:   Good   Psychomotor Behavior: Normal   Attitude:   Cooperative  Interested Friendly Pleasant  Orientation:   All  Speech   Rate / Production: Normal    Volume:  Normal   Mood:    Resentful, grateful  Affect:    Appropriate   Thought Content:   Clear and Safety denies any current safety concerns including suicidal ideation, self-harm, and homicidal ideation  Thought Form:  Coherent  Logical     Insight:    Good     Plan:   Safety Plan: No current safety concerns identified.  Recommended that patient call 911 or go to the local ED should there be a change in any of these risk factors.   Barriers to treatment: None identified  Patient Contracts (see media tab):  None  Substance Use: Not addressed in session   Continue or Discharge: Patient will continue in 55+ Program (55+) as planned. Patient is likely to benefit from learning and using skills as they work toward the goals identified in their treatment plan.      Darling Leiva, [facility]  February 5, 2025  "

## 2025-02-06 ENCOUNTER — VIRTUAL VISIT (OUTPATIENT)
Dept: PHARMACY | Facility: CLINIC | Age: 65
End: 2025-02-06
Attending: PSYCHIATRY & NEUROLOGY
Payer: MEDICARE

## 2025-02-06 ENCOUNTER — THERAPY VISIT (OUTPATIENT)
Dept: PHYSICAL THERAPY | Facility: REHABILITATION | Age: 65
End: 2025-02-06
Payer: MEDICARE

## 2025-02-06 ENCOUNTER — HOSPITAL ENCOUNTER (OUTPATIENT)
Dept: BEHAVIORAL HEALTH | Facility: CLINIC | Age: 65
End: 2025-02-06
Attending: PSYCHIATRY & NEUROLOGY
Payer: MEDICARE

## 2025-02-06 DIAGNOSIS — G47.00 INSOMNIA, UNSPECIFIED TYPE: ICD-10-CM

## 2025-02-06 DIAGNOSIS — G20.A1 PARKINSON'S DISEASE, UNSPECIFIED WHETHER DYSKINESIA PRESENT, UNSPECIFIED WHETHER MANIFESTATIONS FLUCTUATE (H): Primary | ICD-10-CM

## 2025-02-06 DIAGNOSIS — F33.1 MODERATE EPISODE OF RECURRENT MAJOR DEPRESSIVE DISORDER (H): ICD-10-CM

## 2025-02-06 DIAGNOSIS — F41.1 GAD (GENERALIZED ANXIETY DISORDER): ICD-10-CM

## 2025-02-06 DIAGNOSIS — G20.C PARKINSONISM, UNSPECIFIED PARKINSONISM TYPE (H): Primary | ICD-10-CM

## 2025-02-06 DIAGNOSIS — R32 URINARY INCONTINENCE, UNSPECIFIED TYPE: ICD-10-CM

## 2025-02-06 DIAGNOSIS — R26.9 ABNORMALITY OF GAIT: ICD-10-CM

## 2025-02-06 DIAGNOSIS — F41.1 GENERALIZED ANXIETY DISORDER: Primary | ICD-10-CM

## 2025-02-06 PROCEDURE — 90853 GROUP PSYCHOTHERAPY: CPT | Performed by: SOCIAL WORKER

## 2025-02-06 PROCEDURE — 90853 GROUP PSYCHOTHERAPY: CPT

## 2025-02-06 RX ORDER — LACTOBACILLUS RHAMNOSUS GG 10B CELL
1 CAPSULE ORAL DAILY
COMMUNITY

## 2025-02-06 RX ORDER — CARBIDOPA AND LEVODOPA 25; 100 MG/1; MG/1
TABLET ORAL
Qty: 270 TABLET | Refills: 3 | Status: SHIPPED | OUTPATIENT
Start: 2025-02-06

## 2025-02-06 NOTE — GROUP NOTE
Psychoeducation Group Note    PATIENT'S NAME: Fabiana Hu  MRN:   [id number]  :   1960  ACCT. NUMBER: [id number]  DATE OF SERVICE: 25  START TIME:  3:00 PM  END TIME:  3:50 PM  FACILITATOR: Mallory Sanchez LICSW; Althea Murcia RN  TOPIC:  Wellness Group: [facility] Adult Mental [facility] Outpatient Programs  TRACK: IOP/DT 3    NUMBER OF PARTICIPANTS: 4    Summary of Group / Topics Discussed:  Foundations of Health: Sleep: sleep hygiene Part II: Patients explored the connection between sleep and mental illness. Patients learned about how adequate sleep can improve health, productivity, wellness, quality of life, and safety.     Patient Session Goals / Objectives:  Demonstrated understanding of sleep hygiene practices and benefits of sleep  Identified sleep hygiene strategies to utilize   Described the connection between sleep disturbances and mental illness      Patient Participation / Response:  Fully participated with the group by sharing personal reflections / insights and openly received / provided feedback with other participants.    Demonstrated understanding of topics discussed through group discussion and participation and Verbalized understanding of foundations of health topic    Treatment Plan:  Patient has a current master individualized treatment plan.  See Epic treatment plan for more information.    WENDY Mcdermott

## 2025-02-06 NOTE — GROUP NOTE
Psychotherapy Group Note    PATIENT'S NAME: Fabiana Hu  MRN:   [id number]  :   1960  ACCT. NUMBER: [id number]  DATE OF SERVICE: 25  START TIME:  2:00 PM  END TIME:  2:50 PM  FACILITATOR: Darling Leiva LICSW  TOPIC: MH EBP Group: Emotions Management  [facility] Mental [facility] Outpatient Programs  TRACK: IOP/DT 3 55+    NUMBER OF PARTICIPANTS: 6    Summary of Group / Topics Discussed:  Emotions Management: Guilt and Shame: Patients explored and shared personal experiences associated with feelings of guilt and shame.  Group explored how these feelings develop, what they mean to each individual, and how to increase acceptance and usefulness of these feelings.  Group members assisted one another to contextualize these concepts and promote healing.     Patient Session Goals / Objectives:  Discuss and review definitions and personal views/experiences with guilt and shame  Understand the differences between guilt and shame  Explore how feelings of guilt and shame impact functioning  Understand and practice strategies to manage difficult emotions and move towards healing  Understand and normalize difficult emotions through group discussion  Understand the utility of guilt and shame  Target  unwanted  emotions for change      Patient Participation / Response:  Fully participated with the group by sharing personal reflections / insights and openly received / provided feedback with other participants.    Demonstrated understanding of topics discussed through group discussion and participation, Expressed understanding of the relevance / importance of emotions management skills at distressing times in life, Self-aware of experiences with difficult emotions, and strategies to employ to manage them, Demonstrated knowledge of when to consider applying a variety of emotions management skills in daily life, Demonstrated understanding and practice strategies to manage difficult emotions and move towards  healing, Identified barriers to applying emotions management strategies, Identified strategies to overcome barriers to use of emotions management skills, Identified emotions management strategies that have helped maintain / improve symptoms in the past, and Practiced 2-3 new skills in session    Treatment Plan:  Patient has a current master individualized treatment plan.  See Epic treatment plan for more information.    KIRK CurrieSW

## 2025-02-06 NOTE — TELEPHONE ENCOUNTER
[facility]       Referral Date: 1/22/25    Reason for Referral:  Therapy/Psychiatry    Referral Status: (urgent or general)  general    Method of Communication:   Phone call    Plan or goal of contact/ previous contact information:   SW left  with contact information and requested a return call.     Follow up required:   SW will attempt outreach x3 in 2-5 business days.    Work done on case:   N/a    Important Information and next steps:   N/a      MADHU Ho   - [facility]  [email or web address]  [phone]

## 2025-02-06 NOTE — Clinical Note
Today's visit went ok actually and she seems to be doing better since starting day treatment for anxiety and depression. No changes today.

## 2025-02-06 NOTE — PROGRESS NOTES
Medication Therapy Management (MTM) Encounter    ASSESSMENT:                            Medication Adherence/Access: No issues identified.    Parkinsonism:   Stable. Continue current medication.     Depression/Anxiety/Sleep:   Patient is feeling better, possibly related to starting IOP, which has in turn improved sleep to some degree. Will plan to continue on these medications and she will follow up with psychiatry next week. Unclear whether gabapentin is providing benefit. Pt will follow up with new provider to see about continued dose reductions in order to determine indication for use.    Urinary    Stable. Continue current medication.    PLAN:                            -Continue current medication.  -Talk with your new psychiatrist regarding whether gabapentin is still indicated    Follow-up:  6 months or sooner if needed    SUBJECTIVE/OBJECTIVE:                          Fabiana Hu is a 64 year old female seen for a follow-up visit. Patient was accompanied by .      Reason for visit: med check.    Allergies/ADRs: Reviewed in chart  Past Medical History: Reviewed in chart  Tobacco: She reports that she has never smoked. She has never used smokeless tobacco.  Alcohol: not currently using    Medication Adherence/Access: no issues reported.    Parkinsonism:   Medication Dose Timing    7:30am 11:30am 4:30pm 7:30pm 8-9pm 10pm   Carbidopa/levodopa 25/100 0.5 0.5 0.5 0.5   1     Patient reported that medication seems to be working well overall. She may feel stiff when she wakes during the night, but shifting position in bed relieves this. Does not feel that a medication change is warranted. She does not really notice any wearing off between doses. Does have some dyskinesia occasionally, but not generally predictable. Overall, feeling stable.      Depression/Anxiety/Sleep:   -lithium 750mg daily  -quetiapine 25mg at bedtime  -doxepin 10mg at bedtime  -gabapentin 300mg at bedtime (recently reduced)    Continues to  "see an NP at [facility]. I had been able to connect her to nurse practitioner at [facility] due to desire to keep care all in one system, though she notes that her sister strongly recommended that she see an MD, so she is scheduled with psychiatrist at [facility] next week.     Today, she reported that depression and anxiety do feel \"somewhat better.\"  She has started IOP 55+ program, states that \"it's been tough, but I hope it is helpful.\" Her  adds that he thinks she is doing better, less low and anxious.  She has somewhat recently reduced gabapentin and not notice any positive or negative change in any symptoms.     Patient had been prescribed Dayvigo and took it for about 6 weeks, but was very expensive, so did not elect to continue. They do think that it was somewhat helpful for staying asleep longer, but not a huge difference.  She has been laying down for sleep 10-10:30pm and falling asleep pretty quickly. She may wake up 1-2 times during the night to void or shift position, but able to get back to sleep more easily than before. Waking around 7am and generally feeling rested.     Urinary    Gemtesa 75mg daily    Medication has been generally helpful and urinary frequency has been much improved with the medication. She may still wake 1-2 times overnight, but able to get back to sleep pretty easily.     ----------------      I spent 40 minutes with this patient today. All changes were made via collaborative practice agreement with Ramon Weston.     A summary of these recommendations was sent via clinic portal.    Yancy Wright, PharmD  Medication Therapy Management Pharmacist  [facility]      Telemedicine Visit Details  The patient's medications can be safely assessed via a telemedicine encounter.  Type of service:  Telephone visit  Originating Location (pt. Location): Home    Distant Location (provider location):  Off-site  Start Time:  " 1:00pm  End Time:  1:40pm     Medication Therapy Recommendations  No medication therapy recommendations to display

## 2025-02-06 NOTE — PATIENT INSTRUCTIONS
"Recommendations from today's MTM visit:                                                       -Continue current medication.  -Talk with your new psychiatrist regarding whether gabapentin is still indicated    Follow-up:  6 months or sooner if needed    It was great speaking with you today.  I value your experience and would be very thankful for your time in providing feedback in our clinic survey. In the next few days, you may receive an email or text message from [employer] with a link to a survey related to your  clinical pharmacist.\"     To schedule another MTM appointment, please call the clinic directly or you may call the MTM scheduling line at [phone].    My Clinical Pharmacist's contact information:                                                      Please feel free to contact me with any questions or concerns you have.      Yancy Wirght, PharmD  Medication Therapy Management Pharmacist  [facility]    "

## 2025-02-07 ENCOUNTER — HOSPITAL ENCOUNTER (OUTPATIENT)
Dept: BEHAVIORAL HEALTH | Facility: CLINIC | Age: 65
Discharge: HOME | End: 2025-02-07
Attending: PSYCHIATRY & NEUROLOGY
Payer: MEDICARE

## 2025-02-07 DIAGNOSIS — F41.1 GENERALIZED ANXIETY DISORDER: Primary | ICD-10-CM

## 2025-02-07 PROCEDURE — 90853 GROUP PSYCHOTHERAPY: CPT | Performed by: COUNSELOR

## 2025-02-07 PROCEDURE — 90853 GROUP PSYCHOTHERAPY: CPT | Performed by: SOCIAL WORKER

## 2025-02-07 NOTE — GROUP NOTE
Psychotherapy Group Note    PATIENT'S NAME: Fabiana Hu  MRN:   [id number]  :   1960  ACCT. NUMBER: [id number]  DATE OF SERVICE: 25  START TIME:  2:00 PM  END TIME:  2:50 PM  FACILITATOR: Mallory Sanchez LICSW  TOPIC: MH EBP Group: Coping Skills  [facility]  TRACK: IOP/DT3    NUMBER OF PARTICIPANTS: 4    Summary of Group / Topics Discussed:  Coping Skills: Building Positive Experiences: Patients discussed the importance of planning and engaging in positive experiences, as strategies to increase positive thinking, hope, and self-worth.  Explored the benefits of planning / creating positive experiences, including recognizing and reducing negativity bias by focusing on and building positive experiences.   Several approaches to building positive experiences were presented and discussed relevant to each patient.      Patient Session Goals / Objectives:  Understand the purpose of planning / creating / participating / sharing in positive experiences.  Explore patient s experiences related to negative thinking and how it influences activities and moodIdentify current positive events in patient s life.   Set goals to increase a variety of positive experiences.  Address barriers to planning / engaging in positive experiences.      Patient Participation / Response:  Fully participated with the group by sharing personal reflections / insights and openly received / provided feedback with other participants.    Demonstrated understanding of topics discussed through group discussion and participation    Treatment Plan:  Patient has a current master individualized treatment plan.  See Epic treatment plan for more information.    WENDY Mcdermott

## 2025-02-08 NOTE — GROUP NOTE
Process Group Note    PATIENT'S NAME: Fabiana Hu  MRN:   [id number]  :   1960  ACCT. NUMBER: [id number]  DATE OF SERVICE: 25  START TIME:  1:00 PM  END TIME:  1:50 PM  FACILITATOR: Reuben Qureshi LMFT  TOPIC:  Process Group    Diagnoses:  296.32 (F33.1) Major Depressive Disorder, Recurrent Episode, Moderate _ and With anxious distress  300.02 (F41.1) Generalized Anxiety Disorder.     [facility] Outpatient Programs  TRACK: IOPDT3    NUMBER OF PARTICIPANTS: 5        Data:    Session content: At the start of this group, patients were invited to check in by identifying themselves, describing their current emotional status, and identifying issues to address in this group.   Area(s) of treatment focus addressed in this session included Symptom Management, Personal Safety, and Community Resources/Discharge Planning.    Patient reported feeling tired,  I didn't sleep very well.   She is going through a medication change.  She is anxious about a dermatologist appointment.   Patient discussed working toward  being kind to myself    For skills they will use to address their goal(s), patient identified using more positive self-talk such as  I'm doing my best.   Patient reported no safety concerns and/or self-injurious behaviors. Patient reported no substance use. Patient reported they are taking their medications as prescribed.   Patient reported feeling proud/grateful for her dermatology clinic.    Therapeutic Interventions/Treatment Strategies:  Psychotherapist offered support, feedback and validation. Treatment modalities used include Cognitive Behavioral Therapy. Interventions include Coping Skills: Facilitated understanding of  what factors may contribute to symptom relapse and skills plan to manage symptom relapse  and Symptoms Management: Promoted understanding of their diagnoses and how it impacts their functioning.    Assessment:    Patient response:   Patient responded to  session by accepting feedback, giving feedback, listening, focusing on goals, being attentive and accepting support    Possible barriers to participation / learning include: Severity of symptoms    Health Issues:   Skin health concern       Substance Use Review:   Substance Use: No active concerns identified.    Mental Status/Behavioral Observations  Appearance:   Appropriate   Eye Contact:   Good   Psychomotor Behavior: Normal   Attitude:   Cooperative   Orientation:   All  Speech   Rate / Production: Normal    Volume:  Normal   Mood:    Anxious Depressed  Affect:    Blunted  Thought Content:   Clear and Safety denies any current safety concerns including suicidal ideation, self-harm, and homicidal ideation      2/7/2025     3:00 PM   Suicide Ideation Check In   Since last session, how often have you had suicidal thoughts? No thoughts of suicide     Thought Form:  Coherent  Logical     Insight:    Fair    Plan:   Safety Plan: No current safety concerns identified.  Recommended that patient call 911 or go to the local ED should there be a change in any of these risk factors.   Barriers to treatment: None identified  Patient Contracts (see media tab):  None  Substance Use: Provided encouragement towards sobriety   Continue or Discharge: Patient will continue in 55+ Program (55+) as planned. Patient is likely to benefit from learning and using skills as they work toward the goals identified in their treatment plan.      Reuben Qureshi, DORA  February 8, 2025

## 2025-02-10 ENCOUNTER — HOSPITAL ENCOUNTER (OUTPATIENT)
Dept: BEHAVIORAL HEALTH | Facility: CLINIC | Age: 65
End: 2025-02-10
Attending: PSYCHIATRY & NEUROLOGY
Payer: MEDICARE

## 2025-02-10 DIAGNOSIS — F41.1 GENERALIZED ANXIETY DISORDER: Primary | ICD-10-CM

## 2025-02-10 PROCEDURE — 90853 GROUP PSYCHOTHERAPY: CPT

## 2025-02-10 PROCEDURE — 90853 GROUP PSYCHOTHERAPY: CPT | Performed by: SOCIAL WORKER

## 2025-02-10 NOTE — GROUP NOTE
Psychoeducation Group Note    PATIENT'S NAME: Fabiana Hu  MRN:   [id number]  :   1960  ACCT. NUMBER: [id number]  DATE OF SERVICE: 2/10/25  START TIME:  3:00 PM  END TIME:  3:50 PM  FACILITATOR: Mallory Sanchez LICSW; Althea Murcia RN  TOPIC:  Wellness Group: Medication Education and Management  [facility] Mental [facility] Outpatient [facility]  TRACK: IOP/DT 3    NUMBER OF PARTICIPANTS: 7    Summary of Group / Topics Discussed:  Medication Educations and Management:  Medication Jeopardy: Patients provided education regarding medication safety, antidepressants, side effects, neuroleptics, expected medication outcomes, knowledge of diagnosis, symptoms, and symptom management through an engaging jeopardy-style format.     Patient Session Goals / Objectives:    Participated in team-based Jeopardy game  Identified strategies for safe use, handling, and disposal of medications  Discussed basic aspects of medication safety, side effects, adverse outcomes and contraindications      Patient Participation / Response:  Fully participated with the group by sharing personal reflections / insights and openly received / provided feedback with other participants.     Demonstrated understanding of topics discussed through group discussion and participation and Verbalized understanding of medication education and management topic    Treatment Plan:  Patient has a current master individualized treatment plan.  See Epic treatment plan for more information.    WENDY Mcdermott

## 2025-02-11 NOTE — GROUP NOTE
Psychotherapy Group Note    PATIENT'S NAME: Fabiana Hu  MRN:   [id number]  :   1960  ACCT. NUMBER: [id number]  DATE OF SERVICE: 2/10/25  START TIME:  2:00 PM  END TIME:  2:50 PM  FACILITATOR: Darling Leiva LICSW  TOPIC: MH EBP Group: Coping Skills  [facility] Mental [facility] Outpatient [facility]  TRACK: IOP/DT 3 55+     NUMBER OF PARTICIPANTS: 7    Summary of Group / Topics Discussed:  Coping Skills: Radical Acceptance part 1: Patients learned radical acceptance as a way to tolerate heightened stress in the moment.  Patients identified situations that necessitate radical acceptance.  They focused on applying acceptance of the moment to tolerate difficult emotions and events. Patients discussed how to distinguish when this can be useful in their lives and when other skills are more relevant or helpful.    Patient Session Goals / Objectives:  Understand that some amount of pain exists for each of us in our lives  Process the difficulty of acceptance in our lives and benefits of radical acceptance to mood and functioning.  Demonstrate understanding of when to use the radical acceptance to tolerate distress by providing examples of situations where this could be applied.  Identify barriers to applying radical acceptance to difficult situations and explore strategies to overcome them      Patient Participation / Response:  Fully participated with the group by sharing personal reflections / insights and openly received / provided feedback with other participants.    Demonstrated understanding of topics discussed through group discussion and participation, Expressed understanding of the relevance / importance of coping skills at distressing times in life, Demonstrated knowledge of when to consider using a variety of coping skills in daily life, and Identified barriers to applying coping skills    Treatment Plan:  Patient has a current master individualized treatment plan.  See Epic treatment plan for more  information.    Darling Leiva, LICSW

## 2025-02-11 NOTE — GROUP NOTE
"Process Group Note    PATIENT'S NAME: Fabiana Hu  MRN:   [id number]  :   1960  ACCT. NUMBER: [id number]  DATE OF SERVICE: 2/10/25  START TIME:  1:00 PM  END TIME:  1:50 PM  FACILITATOR: Darling Leiva LICSW  TOPIC:  Process Group    Diagnoses:  296.32 (F33.1) Major Depressive Disorder, Recurrent Episode, Moderate _ and With anxious distress  300.02 (F41.1) Generalized Anxiety Disorder.     [facility] Adult Mental Health Outpatient Programs  TRACK: IOP/DT 3 55+    NUMBER OF PARTICIPANTS: 7        Data:    Session content: At the start of this group, patients were invited to check in by identifying themselves, describing their current emotional status, and identifying issues to address in this group.   Area(s) of treatment focus addressed in this session included Symptom Management, Personal Safety, and Community Resources/Discharge Planning.  Reported mood is \"frustrated, anxious\".  She processed worry about making social commitments.  Main barrier she identifies is \"holding a conversation\".   Client denied safety concerns, including SI and SIB. Client denies any chemical use.  Client is taking medications as prescribed. Client's goal is to cope ahead for her meeting with a friend on Thursday.  Client reported plans to use the following coping skills: positive self-talk, coming up with conversation topics ahead of time. Client encouraged  open and honest communication with her friend. Client is grateful for buying flowers for herself this weekend. Peers offered supportive feedback and validation.        2/10/2025     1:00 PM   Suicide Ideation Check In   Since last session, how often have you had suicidal thoughts? No thoughts of suicide           Therapeutic Interventions/Treatment Strategies:  Psychotherapist offered support, feedback and validation and reinforced use of skills. Treatment modalities used include Cognitive Behavioral Therapy and Dialectical Behavioral Therapy. Interventions include " "Coping Skills: Discussed how the use of intentional \"in the moment\" actions can help reduce distress and Relationship Skills: Assisted patients in implementing more effective communication skills in their relationships and Discussed strategies to promote healthier understanding of interpersonal relationships.    Assessment:    Patient response:   Patient responded to session by accepting feedback, giving feedback, listening, and focusing on goals    Possible barriers to participation / learning include: and no barriers identified    Health Issues:   None reported       Substance Use Review:   Substance Use: No active concerns identified.    Mental Status/Behavioral Observations  Appearance:   Appropriate   Eye Contact:   Good   Psychomotor Behavior: Normal   Attitude:   Cooperative  Interested Friendly Pleasant  Orientation:   All  Speech   Rate / Production: Normal    Volume:  Normal   Mood:    Anxious  Frustrated  Affect:    Appropriate   Thought Content:   Clear and Safety denies any current safety concerns including suicidal ideation, self-harm, and homicidal ideation  Thought Form:  Coherent  Logical     Insight:    Good     Plan:   Safety Plan: No current safety concerns identified.  Recommended that patient call 911 or go to the local ED should there be a change in any of these risk factors.   Barriers to treatment: None identified  Patient Contracts (see media tab):  None  Substance Use: Not addressed in session   Continue or Discharge: Patient will continue in 55+ Program (55+) as planned. Patient is likely to benefit from learning and using skills as they work toward the goals identified in their treatment plan.      WENDY Currie  February 11, 2025  "

## 2025-02-12 ENCOUNTER — HOSPITAL ENCOUNTER (OUTPATIENT)
Dept: BEHAVIORAL HEALTH | Facility: CLINIC | Age: 65
End: 2025-02-12
Attending: PSYCHIATRY & NEUROLOGY
Payer: MEDICARE

## 2025-02-12 ENCOUNTER — HOSPITAL ENCOUNTER (OUTPATIENT)
Dept: BEHAVIORAL HEALTH | Facility: CLINIC | Age: 65
Discharge: HOME | End: 2025-02-12
Attending: PSYCHIATRY & NEUROLOGY
Payer: MEDICARE

## 2025-02-12 DIAGNOSIS — F41.1 GENERALIZED ANXIETY DISORDER: ICD-10-CM

## 2025-02-12 DIAGNOSIS — F33.1 MODERATE EPISODE OF RECURRENT MAJOR DEPRESSIVE DISORDER (H): Primary | ICD-10-CM

## 2025-02-12 DIAGNOSIS — F41.1 GENERALIZED ANXIETY DISORDER: Primary | ICD-10-CM

## 2025-02-12 PROCEDURE — 99214 OFFICE O/P EST MOD 30 MIN: CPT

## 2025-02-12 PROCEDURE — 90853 GROUP PSYCHOTHERAPY: CPT

## 2025-02-12 NOTE — GROUP NOTE
Psychotherapy Group Note    PATIENT'S NAME: Fabiana Hu  MRN:   [id number]  :   1960  ACCT. NUMBER: [id number]  DATE OF SERVICE: 25  START TIME:  2:00 PM  END TIME:  2:50 PM  FACILITATOR: Darling Leiva LICSW  TOPIC: MH EBP Group: Coping Skills  [facility] Adult Mental Health Outpatient Programs  TRACK: IOP/DT 3 55+    NUMBER OF PARTICIPANTS: 7    Summary of Group / Topics Discussed:  Coping Skills: Radical Acceptance part 2: Patients learned radical acceptance as a way to tolerate heightened stress in the moment.  Patients identified situations that necessitate radical acceptance.  They focused on applying acceptance of the moment to tolerate difficult emotions and events. Patients discussed how to distinguish when this can be useful in their lives and when other skills are more relevant or helpful.    Patient Session Goals / Objectives:  Understand that some amount of pain exists for each of us in our lives  Process the difficulty of acceptance in our lives and benefits of radical acceptance to mood and functioning.  Demonstrate understanding of when to use the radical acceptance to tolerate distress by providing examples of situations where this could be applied.  Identify barriers to applying radical acceptance to difficult situations and explore strategies to overcome them      Patient Participation / Response:  Fully participated with the group by sharing personal reflections / insights and openly received / provided feedback with other participants.    Demonstrated understanding of topics discussed through group discussion and participation, Expressed understanding of the relevance / importance of coping skills at distressing times in life, Demonstrated knowledge of when to consider using a variety of coping skills in daily life, Identified barriers to applying coping skills, Identified 2-3 positive coping strategies that have helped maintain / improve symptoms in the past, and Practiced 2-3  new coping skills in session    Treatment Plan:  Patient has a current master individualized treatment plan.  See Epic treatment plan for more information.    KIRK CurrieSW

## 2025-02-12 NOTE — PROGRESS NOTES
[facility]   Adult Mental Health Outpatient Programs  Psychiatric Progress Record    Program Track: IOP/DT 3 55+ [place]    PATIENT'S NAME: Fabiana Hu  MRN:   [id number]  :   1960  ACCT. NUMBER: [id number]  DATE OF SERVICE: 25    Interval History:  Fabiana presents today for follow-up and ongoing program supervision.   Endorses:  I do not think I am going to stay in the program because it is overwhelming for me  I feel like I am a bad person. It is not right that I am not contributing  I go a new therapist, She is nice. I do not know if we click  I am not good at opening up, to the therapist or in group  My anxiety is increasing, it feels like that way.   I am afraid I will be found out. People will fond out that I am a fraud  I just want to get better. I do not know how  I am have done bad things  I have mean  When I was employed, I did not do a good job  I want to quit the program because people will find out who I am  No suicide thought. I think about self harm but I do not want to do it  I met a new psychiatrist Andrews Vega MD yesterday at [employer]. He seems like a nice person  Follow up in 2 months  No changes to medications  Compliant with all medication. I take medication by myself    Review of Symptoms  Sleep: Sleep has been better  It is not easy to fall sleep. Not always  Appetite: it is ok.   Suicidal ideation: denies current or recent suicidal ideation or behavior  Thoughts of non-suicidal self-injury: denied  Recent self-injurious behavior: denied  Homicidal ideation: denied  Other safety concerns: denied    Activities of Daily Living and Related Systems Impacted by Illness:  Hygiene: ok getting better  Socialization: no much,   Activities of Daily Living: (cleaning, shopping, bills, etc.): difficult, really hard  Concerns related to work: no    Substance use:  Denies    Medications:  Current Outpatient Medications   Medication Sig Dispense Refill    artificial  "saliva (BIOTENE MT) AERS spray Take 1 spray by mouth every 6 hours as needed for dry mouth. 10 mL 3    carbidopa-levodopa (SINEMET)  MG tablet 1/2 tab at 7:30am, 11:30am, 4:30pm and 7:30pm, 1 tab @10pm 270 tablet 3    doxepin (SINEQUAN) 10 MG capsule Take 10 mg by mouth at bedtime.      gabapentin (NEURONTIN) 300 MG capsule Take 600 mg by mouth at bedtime.      GEMTESA 75 MG TABS tablet TAKE 1 TABLET BY MOUTH EVERY DAY 90 tablet 1    lactobacillus rhamnosus, GG, (CULTURELL) capsule Take 1 capsule by mouth daily.      levothyroxine (SYNTHROID/LEVOTHROID) 75 MCG tablet Take 1 tablet by mouth daily      lithium (ESKALITH) 300 MG tablet Take 750 mg by mouth at bedtime.      QUEtiapine (SEROQUEL) 25 MG tablet Take 25 mg by mouth at bedtime. For sleep and anxiety      vitamin D3 (CHOLECALCIFEROL) 50 mcg (2000 units) tablet Take 1 tablet by mouth daily.         The above list was reviewed and updated in [facility] with patient today.     Patient is taking medications as prescribed and denies adverse effects    Laboratory Results:  Most recent labs reviewed. Pertinent updates/findings: None.     Mental Status Examination:  Vital Signs: There were no vitals taken for this visit.   Appearance: adequately groomed, appears stated age, and in no apparent distress.  Attitude: cooperative   Eye Contact: good   Muscle Strength and Tone: normal  Psychomotor Behavior: normal or unremarkable   Gait and Station: normal width, turn, arm swing  Speech: clear, coherent, decreased prosody, regular rate, regular rhythm, and fluent  Associations: No loosening of associations  Thought Process: coherent and goal directed  Thought Content: no evidence of suicidal ideation or homicidal ideation, no evidence of psychotic thought, no auditory hallucinations present, and no visual hallucinations present  Mood: \"anxious and depressed\"  Affect: mood congruent, intensity is blunted, and intensity is flat  Insight: good  Judgment: intact, adequate for " safety  Impulse Control: intact  Oriented to: time, place, person, and situation  Attention Span and Concentration: Normal  Language: Intact  Recent and Remote Memory: Delayed & immediate recall intact  Fund of Knowledge/Assessment of Intelligence: Average  Capacity of Activities of Daily Living: Independent, able to participate in programmatic care services.    Diagnosis/es:    ICD-10-CM    1. Moderate episode of recurrent major depressive disorder (H)  F33.1       2. Generalized anxiety disorder  F41.1         Assessment/Plan:  Fabiana presents today for follow-up psychiatric evaluation and assessment of progress. She reported a lack of interest, flat affect, and difficulty with motivation. She denied suicidal thoughts but acknowledged occasional thoughts of self-harm without intent or plan. She recently established care with a new outpatient mental health provider who will manage her medications moving forward.    To addresses the depression and anxiety concerns, she will continue psychotherapy in IOP to develop coping skills, reduce safety risks, and enhance self-care.   Follow-up in 3 weeks or sooner if needed.    Depression  Overall unchanged   Engage in psychotherapy  Continue to work with outpatient provider for medication management  Continue with current medication regimen  Improve sleep hygiene  Maintain a balanced diet  Engage in physical activities as tolerated     Anxiety  Overall unchanged   As noted above    Safety Assessment:  Fabiana reports suicidal ideation and/or non-suicidal self-injury or thoughts thereof as noted above  Fabiana is future-oriented and is engaged in treatment planning   I do not feel that Fabiana meets criteria for a 72-hour involuntary hold and remains appropriate for an outpatient level of care    KIRSTIN SINGH DNP on 2/12/2025 at 3:10 PM    Visit Details:  Type of service: In-person  Location (patient and provider): [facility] Adult Mental Health Outpatient Programmatic Care  Offices    Level of Medical Decision Making:   - At least 1 chronic problem that is not stable  - Engaged in prescription drug management during visit (discussed any medication benefits, side effects, alternatives, etc.)  Discussion of management or test interpretation with external physician/other qualified healthcare professional/appropriate source - programmatic care multidisciplinary treatment team    30 min spent on the date of the encounter in chart review, patient visit, review of tests, documentation, care coordination, and/or discussion with other providers about the issues documented above.      This document completed in part using [employer] dictation software and therefore may contain inadvertent word or phrase substitutions.

## 2025-02-13 NOTE — GROUP NOTE
"Process Group Note    PATIENT'S NAME: Fabiana Hu  MRN:   [id number]  :   1960  ACCT. NUMBER: [id number]  DATE OF SERVICE: 25  START TIME:  1:00 PM  END TIME:  1:50 PM  FACILITATOR: Darling Leiva LICSW  TOPIC:  Process Group    Diagnoses:  296.32 (F33.1) Major Depressive Disorder, Recurrent Episode, Moderate _ and With anxious distress  300.02 (F41.1) Generalized Anxiety Disorder.     [facility] Mental Health Outpatient Programs  TRACK: IOP/DT 3 55+    NUMBER OF PARTICIPANTS: 7        Data:    Session content: At the start of this group, patients were invited to check in by identifying themselves, describing their current emotional status, and identifying issues to address in this group.   Area(s) of treatment focus addressed in this session included Symptom Management, Personal Safety, and Community Resources/Discharge Planning.  Reported mood is \"depressed, low energy\".  She reported discomfort with her stitches from her surgery on Friday.   Client denied safety concerns, including SI and SIB. Client denies any chemical use.  Client is taking medications as prescribed. Client's goal is \"self compassion\".  Client reported plans to use the following coping skills: \"not put pressure on myself to do too much\", ask for help, self-soothe (e.g. \"soak my feet\"). Peers offered supportive feedback and validation.        2025     3:00 PM   Suicide Ideation Check In   Since last session, how often have you had suicidal thoughts? No thoughts of suicide             Therapeutic Interventions/Treatment Strategies:  Psychotherapist offered support, feedback and validation and reinforced use of skills. Treatment modalities used include Cognitive Behavioral Therapy and Dialectical Behavioral Therapy. Interventions include Cognitive Restructuring:  Explored impact of ineffective thoughts / distortions on mood and activity and Assisted patient in formulating new neutral/positive alternatives to " challenge less helpful / ineffective thoughts, Coping Skills: Discussed use of self-soothe skills to decrease distress in the body, and Emotions Management:  Discussed barriers to emotional regulation.    Assessment:    Patient response:   Patient responded to session by accepting feedback, giving feedback, listening, focusing on goals, being attentive, and accepting support    Possible barriers to participation / learning include: and no barriers identified    Health Issues:   Yes: Chronic disease management, Associated Psychological Distress       Substance Use Review:   Substance Use: No active concerns identified.    Mental Status/Behavioral Observations  Appearance:   Appropriate   Eye Contact:   Good   Psychomotor Behavior: Normal   Attitude:   Cooperative  Interested Friendly Pleasant  Orientation:   All  Speech   Rate / Production: Normal    Volume:  Normal   Mood:    Depressed   Affect:    Appropriate   Thought Content:   Clear and Safety denies any current safety concerns including suicidal ideation, self-harm, and homicidal ideation  Thought Form:  Coherent  Logical     Insight:    Good     Plan:   Safety Plan: No current safety concerns identified.  Recommended that patient call 911 or go to the local ED should there be a change in any of these risk factors.   Barriers to treatment: None identified  Patient Contracts (see media tab):  None  Substance Use: Not addressed in session   Continue or Discharge: Patient will continue in 55+ Program (55+) as planned. Patient is likely to benefit from learning and using skills as they work toward the goals identified in their treatment plan.      WENDY Currie  February 13, 2025

## 2025-02-17 ENCOUNTER — THERAPY VISIT (OUTPATIENT)
Dept: PHYSICAL THERAPY | Facility: REHABILITATION | Age: 65
End: 2025-02-17
Payer: MEDICARE

## 2025-02-17 ENCOUNTER — HOSPITAL ENCOUNTER (OUTPATIENT)
Dept: BEHAVIORAL HEALTH | Facility: CLINIC | Age: 65
End: 2025-02-17
Attending: PSYCHIATRY & NEUROLOGY
Payer: MEDICARE

## 2025-02-17 DIAGNOSIS — R26.9 ABNORMALITY OF GAIT: ICD-10-CM

## 2025-02-17 DIAGNOSIS — R25.8 BRADYKINESIA: ICD-10-CM

## 2025-02-17 DIAGNOSIS — F41.1 GENERALIZED ANXIETY DISORDER: Primary | ICD-10-CM

## 2025-02-17 DIAGNOSIS — G20.A1 PARKINSON'S DISEASE, UNSPECIFIED WHETHER DYSKINESIA PRESENT, UNSPECIFIED WHETHER MANIFESTATIONS FLUCTUATE (H): Primary | ICD-10-CM

## 2025-02-17 PROCEDURE — 90853 GROUP PSYCHOTHERAPY: CPT

## 2025-02-17 PROCEDURE — 90853 GROUP PSYCHOTHERAPY: CPT | Performed by: SOCIAL WORKER

## 2025-02-17 PROCEDURE — 97112 NEUROMUSCULAR REEDUCATION: CPT | Mod: GP | Performed by: PHYSICAL THERAPIST

## 2025-02-17 PROCEDURE — 97110 THERAPEUTIC EXERCISES: CPT | Mod: GP | Performed by: PHYSICAL THERAPIST

## 2025-02-17 NOTE — GROUP NOTE
Psychoeducation Group Note    PATIENT'S NAME: Fabiana Hu  MRN:   [id number]  :   1960  ACCT. NUMBER: [id number]  DATE OF SERVICE: 25  START TIME:  3:00 PM  END TIME:  3:50 PM  FACILITATOR: Mallory Sanchez LICSW; Althea Murcia RN  TOPIC: MH Wellness Group: Health Maintenance  [facility] Adult Mental Health Outpatient Programs  TRACK: IOP/DT 3    NUMBER OF PARTICIPANTS: 6    Summary of Group / Topics Discussed:  Health Maintenance: Eight Dimensions of Wellness: The concept of holistic health through the model of eight dimensions was introduced. Group members participated in identifying behaviors and activities in each of the dimensions of wellness.  The importance of each dimension was reinforced and the concept of balance in life as it relates to wellness was explored.      Patient Session Goals / Objectives:  Verbalized understanding of balance in wellness and how it relates to their life  Identified and explained the eight dimensions of wellness  Categorized activities and wellness needs into corresponding dimensions appropriately during exercise        Patient Participation / Response:  Fully participated with the group by sharing personal reflections / insights and openly received / provided feedback with other participants.    Demonstrated understanding of topics discussed through group discussion and participation and Verbalized understanding of health maintenance topic    Treatment Plan:  Patient has a current master individualized treatment plan.  See Epic treatment plan for more information.    WENDY Mcdermott

## 2025-02-17 NOTE — GROUP NOTE
Psychoeducation Group Note    PATIENT'S NAME: Fabiana Hu  MRN:   [id number]  :   1960  ACCT. NUMBER: [id number]  DATE OF SERVICE: 25  START TIME:  2:00 PM  END TIME:  2:50 PM  FACILITATOR: Darling Leiva LICSW  TOPIC: MH Life Skills Group: Lifestyle Balance and Structure  [facility] Adult Mental Health Outpatient Programs  TRACK: IOP/DT 3 55+    NUMBER OF PARTICIPANTS: 7    Summary of Group / Topics Discussed:  Lifestyle Balance and Strucure:  Goal-setting & integration: Patients were introduced to the process and benefits of setting realistic, timely, and achievable goals to help support their ability to follow through with meaningful personal, health, recovery and treatment goals that they would like to achieve to improve overall functioning.  Patients were also taught and then practiced techniques to manage a variety of obstacles to achieve their goals and how to break goals into manageable pieces.  Patients also reported on follow through of goals.     Patient Session Goals / Objectives:  Facilitated the creation of a vision for recovery and wellbeing  Identified and wrote meaningful SMART goals to engage in meaningful activities of daily living   Identified and problem solved barriers to achieving goals   Identified plan to support follow through on goals and reflection on progress made      Patient Participation / Response:  Fully participated with the group by sharing personal reflections / insights and openly received / provided feedback with other participants.    Verbalized understanding of content, Patient would benefit from additional opportunities to practice the content to be able to generalize it to their everyday life with increased intentionality, consistency, and efficacy in support of their psychiatric recovery, Patient worked towards initial treatment plan goals , and Patient made progress towards meeting ITP goal number all    Treatment Plan:  Patient has a current master  individualized treatment plan.  See Epic treatment plan for more information.    KIRK CurrieSW

## 2025-02-18 NOTE — GROUP NOTE
"Process Group Note    PATIENT'S NAME: Fabiana Hu  MRN:   [id number]  :   1960  ACCT. NUMBER: [id number]  DATE OF SERVICE: 25  START TIME:  1:00 PM  END TIME:  1:50 PM  FACILITATOR: Darling Leiva LICSW  TOPIC:  Process Group    Diagnoses:  296.32 (F33.1) Major Depressive Disorder, Recurrent Episode, Moderate _ and With anxious distress  300.02 (F41.1) Generalized Anxiety Disorder.     [facility] Adult Mental Health Outpatient Programs  TRACK: IOP/DT 3 55+    NUMBER OF PARTICIPANTS: 7        Data:    Session content: At the start of this group, patients were invited to check in by identifying themselves, describing their current emotional status, and identifying issues to address in this group.   Area(s) of treatment focus addressed in this session included Symptom Management, Personal Safety, and Community Resources/Discharge Planning.  Reported mood is \"flat\".  She reported having a \"good weekend\" with her daughter visiting (\"it made me happy\").  She reports this visit reminding her the importance of gratitude for her family.   Client denied safety concerns, including SI and SIB. Client denies any chemical use.  Client is taking medications as prescribed. Client's goal is start gratitude journaling again. Client talked about a goal to exercise with her daughter.  Peers offered supportive feedback and validation.        2025     1:00 PM   Suicide Ideation Check In   Since last session, how often have you had suicidal thoughts? No thoughts of suicide               Therapeutic Interventions/Treatment Strategies:  Psychotherapist offered support, feedback and validation and reinforced use of skills. Treatment modalities used include Cognitive Behavioral Therapy and Dialectical Behavioral Therapy. Interventions include Coping Skills: Discussed use of self-soothe skills to decrease distress in the body and Reviewed patients current calming practices and discussed a more formal way of practicing " "and accessing skills.    Assessment:    Patient response:   Patient responded to session by accepting feedback, giving feedback, listening, focusing on goals, being attentive, and accepting support    Possible barriers to participation / learning include: and no barriers identified    Health Issues:   Yes: Chronic disease management, No Psychological Distress       Substance Use Review:   Substance Use: No active concerns identified.    Mental Status/Behavioral Observations  Appearance:   Appropriate   Eye Contact:   Good   Psychomotor Behavior: Normal   Attitude:   Cooperative  Interested Friendly Pleasant  Orientation:   All  Speech   Rate / Production: Normal    Volume:  Normal   Mood:    Depressed  \"Flat\"  Affect:    Subdued   Thought Content:   Clear and Safety denies any current safety concerns including suicidal ideation, self-harm, and homicidal ideation  Thought Form:  Coherent  Logical     Insight:    Good     Plan:   Safety Plan: No current safety concerns identified.  Recommended that patient call 911 or go to the local ED should there be a change in any of these risk factors.   Barriers to treatment: None identified  Patient Contracts (see media tab):  None  Substance Use: Not addressed in session   Continue or Discharge: Patient will continue in 55+ Program (55+) as planned. Patient is likely to benefit from learning and using skills as they work toward the goals identified in their treatment plan.      Darling Leiva, [facility]  February 18, 2025  "

## 2025-02-19 ENCOUNTER — MYC MEDICAL ADVICE (OUTPATIENT)
Dept: NEUROLOGY | Facility: CLINIC | Age: 65
End: 2025-02-19
Payer: MEDICARE

## 2025-02-19 ENCOUNTER — HOSPITAL ENCOUNTER (OUTPATIENT)
Dept: BEHAVIORAL HEALTH | Facility: CLINIC | Age: 65
End: 2025-02-19
Attending: PSYCHIATRY & NEUROLOGY
Payer: MEDICARE

## 2025-02-19 DIAGNOSIS — F41.1 GENERALIZED ANXIETY DISORDER: Primary | ICD-10-CM

## 2025-02-19 DIAGNOSIS — G20.B1 PARKINSON'S DISEASE WITH DYSKINESIA WITHOUT FLUCTUATING MANIFESTATIONS (H): Primary | ICD-10-CM

## 2025-02-19 PROCEDURE — 90853 GROUP PSYCHOTHERAPY: CPT

## 2025-02-19 PROCEDURE — 90853 GROUP PSYCHOTHERAPY: CPT | Performed by: SOCIAL WORKER

## 2025-02-19 NOTE — GROUP NOTE
Psychotherapy Group Note    PATIENT'S NAME: Fabiana Hu  MRN:   [id number]  :   1960  ACCT. NUMBER: [id number]  DATE OF SERVICE: 25  START TIME:  3:00 PM  END TIME:  3:50 PM  FACILITATOR: Mallory Sanchez LICSW; Chris Rowe OTR  TOPIC: MH EBP Group: Coping Skills  [facility] Outpatient [facility]  TRACK: IOP/DT 3    NUMBER OF PARTICIPANTS: 7    Summary of Group / Topics Discussed:  Coping Skills: Self-Soothe: Patients learned to apply self-soothe as a way to decrease heightened stress in the moment.  Patients identified situations that necessitate self-soothe strategies.  They focused on ways to manage physical symptoms of distress using the senses. They discussed how to distinguish when this can be useful in their lives when other strategies are more relevant or helpful.    Patient Session Goals / Objectives:  Understand the purpose of using the senses to decrease distress  Process what happens in the body when using self-soothe strategies  Demonstrate understanding of when to use self-soothe strategies  Choose 1-2 self-soothe strategies to apply during times of distress.      Patient Participation / Response:  Fully participated with the group by sharing personal reflections / insights and openly received / provided feedback with other participants.    Demonstrated understanding of topics discussed through group discussion and participation and Expressed understanding of the relevance / importance of coping skills at distressing times in life    Treatment Plan:  Patient has a current master individualized treatment plan.  See Epic treatment plan for more information.    EWNDY Mcdermott

## 2025-02-19 NOTE — GROUP NOTE
Psychotherapy Group Note    PATIENT'S NAME: Fabiana Hu  MRN:   [id number]  :   1960  ACCT. NUMBER: [id number]  DATE OF SERVICE: 25  START TIME:  2:00 PM  END TIME:  2:50 PM  FACILITATOR: Darling Leiva LICSW  TOPIC: MH EBP Group: Relationship Skills  [facility] Adult Mental [facility] Outpatient Programs  TRACK: IOP/DT 3 55+    NUMBER OF PARTICIPANTS: 7    Summary of Group / Topics Discussed:  Relationship Skills: Boundaries: Patients were provided with a general overview of interpersonal boundaries and how lack of boundaries relates to symptoms and functioning. The purpose is to help patients identify boundary issues and gain awareness and skills to work towards healthier interpersonal boundaries. Current awareness of healthy boundary characteristics and barriers to establishing healthy boundaries were discussed.    Patient Session Goals / Objectives:  Familiarized patients with the concept of interpersonal boundaries and their characteristics  Discussed and practiced strategies to promote healthier interpersonal boundaries  Identified boundary issues and identified plan to improve boundaries      Patient Participation / Response:  Fully participated with the group by sharing personal reflections / insights and openly received / provided feedback with other participants.    Demonstrated understanding of topics discussed through group discussion and participation, Demonstrated understanding of relationship skills and communication skills, Identified / Expressed personal readiness to incorporate effective communication skills, Verbalized understanding of communication skills, communication challenges, and communication strengths, and Identified plan to address barriers to practicing relationship skills     Treatment Plan:  Patient has a current master individualized treatment plan.  See Epic treatment plan for more information.    WENDY Currie

## 2025-02-20 ENCOUNTER — HOSPITAL ENCOUNTER (OUTPATIENT)
Dept: BEHAVIORAL HEALTH | Facility: CLINIC | Age: 65
End: 2025-02-20
Attending: PSYCHIATRY & NEUROLOGY
Payer: MEDICARE

## 2025-02-20 ENCOUNTER — THERAPY VISIT (OUTPATIENT)
Dept: PHYSICAL THERAPY | Facility: REHABILITATION | Age: 65
End: 2025-02-20
Payer: MEDICARE

## 2025-02-20 DIAGNOSIS — F41.1 GENERALIZED ANXIETY DISORDER: Primary | ICD-10-CM

## 2025-02-20 DIAGNOSIS — G20.A1 PARKINSON'S DISEASE, UNSPECIFIED WHETHER DYSKINESIA PRESENT, UNSPECIFIED WHETHER MANIFESTATIONS FLUCTUATE (H): Primary | ICD-10-CM

## 2025-02-20 PROCEDURE — 90853 GROUP PSYCHOTHERAPY: CPT

## 2025-02-20 PROCEDURE — 90853 GROUP PSYCHOTHERAPY: CPT | Performed by: SOCIAL WORKER

## 2025-02-20 RX ORDER — CARBIDOPA AND LEVODOPA 25; 100 MG/1; MG/1
TABLET ORAL
Qty: 270 TABLET | Refills: 3 | Status: SHIPPED | OUTPATIENT
Start: 2025-02-20

## 2025-02-20 NOTE — GROUP NOTE
"Process Group Note    PATIENT'S NAME: Fabiana Hu  MRN:   [id number]  :   1960  ACCT. NUMBER: [id number]  DATE OF SERVICE: 25  START TIME:  1:00 PM  END TIME:  1:50 PM  FACILITATOR: Darling Leiva LICSW  TOPIC:  Process Group    Diagnoses:  296.32 (F33.1) Major Depressive Disorder, Recurrent Episode, Moderate _ and With anxious distress  300.02 (F41.1) Generalized Anxiety Disorder.     [facility] Adult Mental [facility] Outpatient Programs  TRACK: IOP/DT 3 55+    NUMBER OF PARTICIPANTS: 8        Data:    Session content: At the start of this group, patients were invited to check in by identifying themselves, describing their current emotional status, and identifying issues to address in this group.   Area(s) of treatment focus addressed in this session included Symptom Management, Personal Safety, and Community Resources/Discharge Planning.  Reported mood is anxious with ruminative thoughts. She woke up again at 3am with ruminative thoughts about \"mistakes\".  She reported trying many grounding skills (e.g. half smile willing hands, counting, reading) in 15 minute increments but was not able to reduce anxiety. Writer encouraged her to try grounding skills for longer and to get out of bed to do a relaxing activity.   Client denied safety concerns, including SI and SIB. Client denies any chemical use.  Client is taking medications as prescribed. Peers offered supportive feedback and validation.          2025     3:00 PM   Suicide Ideation Check In   Since last session, how often have you had suicidal thoughts? No thoughts of suicide             Therapeutic Interventions/Treatment Strategies:  Psychotherapist offered support, feedback and validation and reinforced use of skills. Treatment modalities used include Cognitive Behavioral Therapy and Dialectical Behavioral Therapy. Interventions include Cognitive Restructuring:  Explored impact of ineffective thoughts / distortions on mood and activity, " Assisted patient in formulating new neutral/positive alternatives to challenge less helpful / ineffective thoughts, Facilitated recognition of the connection between negative thoughts and negative core beliefs, and Assisted patient in identifying new neutral/positive core beliefs.    Assessment:    Patient response:   Patient responded to session by accepting feedback, giving feedback, listening, focusing on goals, being attentive, and accepting support    Possible barriers to participation / learning include: and no barriers identified    Health Issues:   Yes: Chronic disease management, No Psychological Distress       Substance Use Review:   Substance Use: No active concerns identified.    Mental Status/Behavioral Observations  Appearance:   Appropriate   Eye Contact:   Good   Psychomotor Behavior: Normal   Attitude:   Cooperative  Interested Friendly Pleasant  Orientation:   All  Speech   Rate / Production: Normal    Volume:  Normal   Mood:    Anxious  Depressed   Affect:    Appropriate   Thought Content:   Rumination and Safety denies any current safety concerns including suicidal ideation, self-harm, and homicidal ideation  Thought Form:  Coherent  Logical     Insight:    Good     Plan:   Safety Plan: No current safety concerns identified.  Recommended that patient call 911 or go to the local ED should there be a change in any of these risk factors.   Barriers to treatment: None identified  Patient Contracts (see media tab):  None  Substance Use: Not addressed in session   Continue or Discharge: Patient will continue in 55+ Program (55+) as planned. Patient is likely to benefit from learning and using skills as they work toward the goals identified in their treatment plan.      WENDY Currie  February 20, 2025

## 2025-02-20 NOTE — GROUP NOTE
Psychotherapy Group Note    PATIENT'S NAME: Fabiana Hu  MRN:   [id number]  :   1960  ACCT. NUMBER: [id number]  DATE OF SERVICE: 25  START TIME:  2:00 PM  END TIME:  2:50 PM  FACILITATOR: Darling Leiva LICSW  TOPIC: MH EBP Group: Relationship Skills  [facility] Adult Mental Health Outpatient Programs  TRACK: IOP/DT 3 55+    NUMBER OF PARTICIPANTS: 8    Summary of Group / Topics Discussed:  Relationship Skills: Assertive Communication/CORAL: Patients were provided with a general overview of assertive communication skills and how practicing assertive communication skills will assist patients in developing healthier and more effective relationships. Patients reviewed their current awareness on ability to practice assertive communication, ways to increase assertive communication, and identified/problem solved barriers to assertive communication.     Patient Session Goals / Objectives:  Identified and discussed patient individual challenges with communication  Discussed objective effectiveness skills using CORAL (DBT)  Presented and practiced effective communication skills in session  Assisted patients in implementing more effective communication skills in their relationships      Patient Participation / Response:  Fully participated with the group by sharing personal reflections / insights and openly received / provided feedback with other participants.    Demonstrated understanding of topics discussed through group discussion and participation, Demonstrated understanding of relationship skills and communication skills, Identified / Expressed personal readiness to incorporate effective communication skills, Verbalized understanding of communication skills, communication challenges, and communication strengths, Identified plan to address barriers to practicing relationship skills , and Practiced skills in session    Treatment Plan:  Patient has a current master individualized treatment plan.  See  Epic treatment plan for more information.    KIRK CurrieSW

## 2025-02-20 NOTE — GROUP NOTE
"Process Group Note    PATIENT'S NAME: Fabiana Hu  MRN:   [id number]  :   1960  ACCT. NUMBER: [id number]  DATE OF SERVICE: 25  START TIME:  1:00 PM  END TIME:  1:50 PM  FACILITATOR: Darling Leiva LICSW  TOPIC:  Process Group    Diagnoses:  296.32 (F33.1) Major Depressive Disorder, Recurrent Episode, Moderate _ and With anxious distress  300.02 (F41.1) Generalized Anxiety Disorder.     [facility] Mental [facility] Outpatient Programs  TRACK: IOP/DT 3 55+    NUMBER OF PARTICIPANTS: 7        Data:    Session content: At the start of this group, patients were invited to check in by identifying themselves, describing their current emotional status, and identifying issues to address in this group.   Area(s) of treatment focus addressed in this session included Symptom Management, Personal Safety, and Community Resources/Discharge Planning.  Reported mood is depressed, \"tired\".  She reports waking up early today with ruminative thoughts (\"mistakes I've made\").  Another barrier to getting out of bed was the cold temperatures.  She reports using affirmations and self-compassion strategies.  She asked the group for additional strategies and she was open to cognitive reframing skills and sleep hygiene behaviors.   Client denied safety concerns, including SI and SIB. Client denies any chemical use.  Client is taking medications as prescribed.  Client is grateful for having a \"good lunch\". Peers offered supportive feedback and validation.        2025     2:00 PM   Suicide Ideation Check In   Since last session, how often have you had suicidal thoughts? No thoughts of suicide             Therapeutic Interventions/Treatment Strategies:  Psychotherapist offered support, feedback and validation and reinforced use of skills. Treatment modalities used include Cognitive Behavioral Therapy and Dialectical Behavioral Therapy. Interventions include Cognitive Restructuring:  Explored impact of ineffective " thoughts / distortions on mood and activity, Assisted patient in formulating new neutral/positive alternatives to challenge less helpful / ineffective thoughts, Facilitated recognition of the connection between negative thoughts and negative core beliefs, and Assisted patient in identifying new neutral/positive core beliefs.    Assessment:    Patient response:   Patient responded to session by accepting feedback, giving feedback, listening, focusing on goals, being attentive, and accepting support    Possible barriers to participation / learning include: and no barriers identified    Health Issues:   Yes: Chronic disease management, No Psychological Distress       Substance Use Review:   Substance Use: No active concerns identified.    Mental Status/Behavioral Observations  Appearance:   Appropriate   Eye Contact:   Good   Psychomotor Behavior: Normal   Attitude:   Cooperative  Interested Friendly Pleasant  Orientation:   All  Speech   Rate / Production: Normal    Volume:  Normal   Mood:    Anxious  Depressed   Affect:    Appropriate   Thought Content:   Rumination and Safety denies any current safety concerns including suicidal ideation, self-harm, and homicidal ideation  Thought Form:  Coherent  Logical     Insight:    Good     Plan:   Safety Plan: No current safety concerns identified.  Recommended that patient call 911 or go to the local ED should there be a change in any of these risk factors.   Barriers to treatment: None identified  Patient Contracts (see media tab):  None  Substance Use: Not addressed in session   Continue or Discharge: Patient will continue in 55+ Program (55+) as planned. Patient is likely to benefit from learning and using skills as they work toward the goals identified in their treatment plan.      WENDY Currie  February 20, 2025

## 2025-02-20 NOTE — GROUP NOTE
"Psychoeducation Group Note    PATIENT'S NAME: Fabiana Hu  MRN:   [id number]  :   1960  ACCT. NUMBER: [id number]  DATE OF SERVICE: 25  START TIME:  3:00 PM  END TIME:  3:50 PM  FACILITATOR: Mallory Sanchez LICSW; Althea Murcia RN  TOPIC: MH Life Skills Group: Resiliency Development  [facility] Adult [facility] Outpatient Programs  TRACK: IOP/DT 3    NUMBER OF PARTICIPANTS: 7    Summary of Group / Topics Discussed:  Resiliency Development:  Resilience:  Patients spent time exploring what resiliency means to them. Patients were encouraged to bring to mind someone they look up to in life and assess resilience in that person. Group will discuss ways to foster resiliency--making connections, avoid the tendency to view crises as insurmountable challenges, accept that change is an unavoidable part of life, move toward realistic goals, take decisive actions, look for opportunities for self-discovery, nurture a positive view of yourself, keep things in perspective, maintain a hopeful outlook on life, and take care of yourself.          Patient Session Goals / Objectives:    Patients will identify what it means to them to be \"resilient.\"    Patients will explore ways to develop, foster, and maintain resiliency.     Patients will engage in conversational prompts (taken from above-mentioned ways to develop resilience) in the form of a game to personally consider ways to increase resiliency.       Patient Participation / Response:  Fully participated with the group by sharing personal reflections / insights and openly received / provided feedback with other participants.    Verbalized understanding of content    Treatment Plan:  Patient has a current master individualized treatment plan.  See Epic treatment plan for more information.    WENDY Mcdermott    "

## 2025-02-24 ENCOUNTER — TELEPHONE (OUTPATIENT)
Dept: BEHAVIORAL HEALTH | Facility: CLINIC | Age: 65
End: 2025-02-24
Payer: MEDICARE

## 2025-02-24 ENCOUNTER — HOSPITAL ENCOUNTER (OUTPATIENT)
Dept: BEHAVIORAL HEALTH | Facility: CLINIC | Age: 65
End: 2025-02-24
Attending: PSYCHIATRY & NEUROLOGY
Payer: MEDICARE

## 2025-02-24 DIAGNOSIS — F41.1 GENERALIZED ANXIETY DISORDER: Primary | ICD-10-CM

## 2025-02-24 PROCEDURE — 90853 GROUP PSYCHOTHERAPY: CPT

## 2025-02-24 PROCEDURE — 90853 GROUP PSYCHOTHERAPY: CPT | Performed by: SOCIAL WORKER

## 2025-02-24 NOTE — GROUP NOTE
Psychoeducation Group Note    PATIENT'S NAME: Fabiana Hu  MRN:   [id number]  :   1960  ACCT. NUMBER: [id number]  DATE OF SERVICE: 25  START TIME:  3:00 PM  END TIME:  3:50 PM  FACILITATOR: Mallory Sanchez LICSW; Chris Rowe OTR  TOPIC: MH Life Skills Group: Resiliency Development  [facility] Outpatient Programs  TRACK: IOP/DT 3    NUMBER OF PARTICIPANTS: 7    Summary of Group / Topics Discussed:  Resiliency Development:  Resiliency Development: Wellness: Provided education on wellness as an active process to reach a state of health and fulfillment. Facilitated discussion on the holistic understanding of wellness (physical, emotional, social, spiritual, mental, and environmental) and talked collaboratively about their personal ideas of wellness. Facilitated a self-reflective process where patients identified wellness values and goals through a creative expression task. Patients shared with the group their wellness vision board and reasoning behind their wellness visions. Validation and support provided throughout group process.     Patient Session Goals / Objectives:   Discuss the topic of wellness in a collaborative, supportive manner.   Identify personal wellness motives and goals.   Engage in an experiential intervention to explore wellness in an individualized manner.   Reflect on the process and share insight around their engagement in the creative expression task.       Patient Participation / Response:  Fully participated with the group by sharing personal reflections / insights and openly received / provided feedback with other participants.    Verbalized understanding of content    Treatment Plan:  Patient has a current master individualized treatment plan.  See Epic treatment plan for more information.    WENDY Mcdermott

## 2025-02-24 NOTE — GROUP NOTE
Process Group Note    PATIENT'S NAME: Fabiana Hu  MRN:   [id number]  :   1960  ACCT. NUMBER: [id number]  DATE OF SERVICE: 25  START TIME:  1:00 PM  END TIME:  1:50 PM  FACILITATOR: Yudi Clark [facility]  TOPIC:  Process Group    Diagnoses:  296.32 (F33.1) Major Depressive Disorder, Recurrent Episode, Moderate _ and With anxious distress  300.02 (F41.1) Generalized Anxiety Disorder.     [facility] Adult [facility] Outpatient Programs  TRACK: IOP/DT 3 55+    NUMBER OF PARTICIPANTS: 7        Data:    Session content: At the start of this group, patients were invited to check in by identifying themselves, describing their current emotional status, and identifying issues to address in this group.   Area(s) of treatment focus addressed in this session included Symptom Management and Personal Safety.    Patient reported feeling good about the weekend. Patient discussed working toward continuing the process of driving again. Patient identified affirmations and deep breathing as skills they will use to address their goal(s). Patient reported no safety concerns and/or self-injurious behaviors. Patient reported no substance use. Patient reported they are taking their medications as prescribed. Patient reported feeling grateful for the warmer weather. Patient discussed starting to drive again with the treatment group.         2025     1:00 PM   Suicide Ideation Check In   Since last session, how often have you had suicidal thoughts? No thoughts of suicide                   Therapeutic Interventions/Treatment Strategies:  Psychotherapist offered support, feedback and validation. Treatment modalities used include Motivational Interviewing. Interventions include Behavioral Activation: Explored how behaviors effect mood and interact with thoughts and feelings.    Assessment:    Patient response:   Patient responded to session by accepting feedback, giving feedback, listening, focusing on goals, being  attentive, and accepting support    Possible barriers to participation / learning include: and no barriers identified    Health Issues:   None reported       Substance Use Review:   Substance Use: No active concerns identified.    Mental Status/Behavioral Observations  Appearance:   Appropriate   Eye Contact:   Good   Psychomotor Behavior: Normal   Attitude:   Cooperative   Orientation:   All  Speech   Rate / Production: Normal    Volume:  Normal   Mood:    Normal  Affect:    Appropriate   Thought Content:   Clear  Thought Form:  Coherent  Logical     Insight:    Good     Plan:   Safety Plan: Recommended that patient call 911 or go to the local ED should there be a change in any of these risk factors.  No current safety concerns identified.  Recommended that patient call 911 or go to the local ED should there be a change in any of these risk factors.   Barriers to treatment: None identified  Patient Contracts (see media tab):  None  Substance Use: Not addressed in session   Continue or Discharge: Patient will continue in Adult Day Treatment (ADT)  as planned. Patient is likely to benefit from learning and using skills as they work toward the goals identified in their treatment plan.      Yudi Clark, [facility]  February 24, 2025

## 2025-02-24 NOTE — GROUP NOTE
Psychotherapy Group Note    PATIENT'S NAME: Fabiana Hu  MRN:   [id number]  :   1960  ACCT. NUMBER: [id number]  DATE OF SERVICE: 25  START TIME:  2:00 PM  END TIME:  2:50 PM  FACILITATOR: Laura Franz LICSW  TOPIC:  EBP Group: Specialty Awareness  [facility] Adult Mental Health Outpatient Programs  TRACK: IOP DT 55+    NUMBER OF PARTICIPANTS: 7    Summary of Group / Topics Discussed:  Specialty Topics: Grief/Transitions: Patients received an overview of the grief process.  Patients explored their relationship to loss and how that has affected their mental health symptoms.  Strategies for recognizing loss and ideas for engaging in the grief process was presented and discussed.  Patients identified needs for support and coping skills to manage loss.  The purpose of this specialty topic is to help patients identify the aspects of change due to loss that individuals experience in addition to mental health symptoms to better cope with the grief, loss, and life transitions.      Patient Session Goals / Objectives:  Identified grief, loss, and life transitions   Discussed how grief impacts mental health symptoms and disrupts usual functioning  Identified needs for support and coping with grief and planned further action for coping      Patient Participation / Response:  Fully participated with the group by sharing personal reflections / insights and openly received / provided feedback with other participants.    Demonstrated understanding of topics discussed through group discussion and participation, Identified / Expressed readiness to act on skill suggestions discussed in topic, and Verbalized understanding of ways to proactively manage illness    Treatment Plan:  Patient has a current master individualized treatment plan.  See Epic treatment plan for more information.    WENDY Blakely

## 2025-02-25 NOTE — TELEPHONE ENCOUNTER
[facility] Social Work       Referral Date: 1/22/25    Reason for Referral:  Therapy/Psychiatry    Referral Status: (urgent or general)  General    Method of Communication:   Phone call    Plan or goal of contact/ previous contact information:   SW spoke with patient    Follow up required:   Patient will contact SW when she is available.    Work done on case:   SW attempted final outreach and connected with patient.    Patient stated it was not a good time to talk and she would call back when she was available. SW provided contact information.    Important Information and next steps:       MADHU Ho   - [facility]  [email or web address]  [phone]

## 2025-02-27 DIAGNOSIS — K11.7 DISTURBANCE OF SALIVARY SECRETION: Primary | ICD-10-CM

## 2025-03-03 ENCOUNTER — HOSPITAL ENCOUNTER (OUTPATIENT)
Dept: BEHAVIORAL HEALTH | Facility: CLINIC | Age: 65
End: 2025-03-03
Attending: PSYCHIATRY & NEUROLOGY
Payer: MEDICARE

## 2025-03-03 DIAGNOSIS — F41.1 GENERALIZED ANXIETY DISORDER: Primary | ICD-10-CM

## 2025-03-03 PROCEDURE — 90853 GROUP PSYCHOTHERAPY: CPT | Performed by: SOCIAL WORKER

## 2025-03-03 PROCEDURE — 90853 GROUP PSYCHOTHERAPY: CPT

## 2025-03-03 NOTE — GROUP NOTE
Psychoeducation Group Note    PATIENT'S NAME: Fabiana Hu  MRN:   [id number]  :   1960  ACCT. NUMBER: [id number]  DATE OF SERVICE: 3/03/25  START TIME:  3:00 PM  END TIME:  3:50 PM  FACILITATOR: Mallory Sanchez LICSW  TOPIC:  Life Skills Group: Life Skills  [facility] Adult Mental Health Outpatient Programs  TRACK: IOP/DT 3    NUMBER OF PARTICIPANTS: 6    Summary of Group / Topics Discussed:  Life Skills:  Life Skills: Life Skills Clinic: Provided opportunity for patients to independently choose and engage in a therapeutic activity that supports progress towards their goals and psychiatric recovery. Discussed the skill of behavioral activation and acting opposite to emotion to improve motivation in everyday life tasks. The Life Skills Clinic provides a context for patients to monitor their symptoms, gain self-awareness, practice skills (self-regulation, mindfulness, self-talk, focus/concentration, social, productivity), build a sense of self efficacy and mastery, as well as receive validation, support, and resources. Staff checks in, observes, assesses, and monitors performance skills and patterns in context with each group member.      Patient Session Goals / Objectives:   Independently identify a purposeful and meaningful therapeutic activity.   Identified which goal(s) they are intentionally working on during session.    Reflected on their performance and share insight about their progress.   Practiced and identified a way to generalize a skill to their everyday life.             Patient Participation / Response:  Fully participated with the group by sharing personal reflections / insights and openly received / provided feedback with other participants.    Verbalized understanding of content    Treatment Plan:  Patient has a current master individualized treatment plan.  See Epic treatment plan for more information.    WENDY Mcdermott

## 2025-03-03 NOTE — GROUP NOTE
Psychotherapy Group Note    PATIENT'S NAME: Fabiana Hu  MRN:   [id number]  :   1960  ACCT. NUMBER: [id number]  DATE OF SERVICE: 3/03/25  START TIME:  2:00 PM  END TIME:  2:50 PM  FACILITATOR: Darling Leiva LICSW  TOPIC: MH EBP Group: Emotions Management  [facility] Mental [facility] Outpatient Programs  TRACK: IOP/DT 3 55+    NUMBER OF PARTICIPANTS: 6    Summary of Group / Topics Discussed:  Emotions Management: Anger: Patients explored and shared personal experiences associated with feelings of anger.  Group explored how these feelings develop, what they mean to each individual, and how to increase acceptance and usefulness of these feelings.  Discussed anger as a  secondary  emotion and reviewed ways to manage anger and challenge associated cognitive distortions. Group members worked to contextualize these concepts and promote healing.     Patient Session Goals / Objectives:  Discuss and review definitions and personal views/experiences with anger  Explore how feelings of anger impact functioning  Understand and practice strategies to manage difficult emotions and move towards healing  Demonstrate understanding of the feelings of anger  Verbalize how these emotions have impacted their lives/functioning  Verbalize of knowledge gained and possible interventions to manage feelings      Patient Participation / Response:  Fully participated with the group by sharing personal reflections / insights and openly received / provided feedback with other participants.    Demonstrated understanding of topics discussed through group discussion and participation, Expressed understanding of the relevance / importance of emotions management skills at distressing times in life, Self-aware of experiences with difficult emotions, and strategies to employ to manage them, Demonstrated knowledge of when to consider applying a variety of emotions management skills in daily life, Demonstrated understanding and practice  strategies to manage difficult emotions and move towards healing, Identified barriers to applying emotions management strategies, Identified strategies to overcome barriers to use of emotions management skills, and Identified emotions management strategies that have helped maintain / improve symptoms in the past    Treatment Plan:  Patient has a current master individualized treatment plan.  See Epic treatment plan for more information.    KIRK CurrieSW

## 2025-03-05 ENCOUNTER — HOSPITAL ENCOUNTER (OUTPATIENT)
Dept: BEHAVIORAL HEALTH | Facility: CLINIC | Age: 65
End: 2025-03-05
Attending: PSYCHIATRY & NEUROLOGY
Payer: MEDICARE

## 2025-03-05 DIAGNOSIS — F33.1 MODERATE EPISODE OF RECURRENT MAJOR DEPRESSIVE DISORDER (H): Primary | ICD-10-CM

## 2025-03-05 DIAGNOSIS — F41.1 GENERALIZED ANXIETY DISORDER: Primary | ICD-10-CM

## 2025-03-05 DIAGNOSIS — F41.1 GENERALIZED ANXIETY DISORDER: ICD-10-CM

## 2025-03-05 PROCEDURE — 90853 GROUP PSYCHOTHERAPY: CPT

## 2025-03-05 NOTE — PROGRESS NOTES
"[facility]   Adult Mental Health Outpatient Programs  Psychiatric Progress Record    Program Track: IOP/DT 3 55+ [place]    PATIENT'S NAME: Fabiana Hu  MRN:   [id number]  :   1960  ACCT. NUMBER: [id number]  DATE OF SERVICE: 3/05/25    Interval History:  \"ok.\" Fabiana presents today for follow-up and ongoing program supervision.   Endorses:  I am not sleeping that well  Hard time falling and staying asleep  I was wearing this thing to test my sleep [sleep apnea testing]  The new medication my docit gave me is not covered by insurance, I am only on Seroquel and Doxepin  It is hard to know I am feeling any better. I asked to see my outpatient provider earlier, meeting in April  I am working out my goal, I am trying  I am working on Catastrophizing  No safety concerns. No suicide ideations     Review of Symptoms  Sleep: see above  Appetite: Same  Catastrophizing  Suicidal ideation: denies current or recent suicidal ideation or behavior  Thoughts of non-suicidal self-injury: denied  Recent self-injurious behavior: denied  Homicidal ideation: denied  Other safety concerns: denied    Activities of Daily Living and Related Systems Impacted by Illness:  Hygiene: no noted concerns  Socialization: limited  Activities of Daily Living: (cleaning, shopping, bills, etc.): not much   Concerns related to work: no    Substance use:  Denies    Medications:  Current Outpatient Medications   Medication Sig Dispense Refill    artificial saliva (BIOTENE MT) AERS spray Take 1 spray by mouth every 6 hours as needed for dry mouth. 10 mL 3    carbidopa-levodopa (SINEMET)  MG tablet 1/2 tab at 7:30am, 11:30am, 4:30pm and 7:30pm, 1 tab @10pm 270 tablet 3    doxepin (SINEQUAN) 10 MG capsule Take 10 mg by mouth at bedtime.      gabapentin (NEURONTIN) 300 MG capsule Take 600 mg by mouth at bedtime.      GEMTESA 75 MG TABS tablet TAKE 1 TABLET BY MOUTH EVERY DAY 90 tablet 1    lactobacillus rhamnosus, GG, (CULTURELL) capsule " "Take 1 capsule by mouth daily.      levothyroxine (SYNTHROID/LEVOTHROID) 75 MCG tablet Take 1 tablet by mouth daily      lithium (ESKALITH) 300 MG tablet Take 750 mg by mouth at bedtime.      QUEtiapine (SEROQUEL) 25 MG tablet Take 25 mg by mouth at bedtime. For sleep and anxiety      vitamin D3 (CHOLECALCIFEROL) 50 mcg (2000 units) tablet Take 1 tablet by mouth daily.         The above list was reviewed and updated in EPIC with patient today.     Patient is taking medications as prescribed and denies adverse effects    Laboratory Results:  Most recent labs reviewed. Pertinent updates/findings: None.     Mental Status Examination:  Vital Signs: There were no vitals taken for this visit.   Appearance: adequately groomed, appears stated age, and in no apparent distress.  Attitude: cooperative   Eye Contact: good   Muscle Strength and Tone: normal  Psychomotor Behavior: normal or unremarkable   Gait and Station: normal width, turn, arm swing  Speech: clear, coherent, decreased prosody, regular rate, regular rhythm, and fluent  Associations: No loosening of associations  Thought Process: coherent and goal directed  Thought Content: no evidence of suicidal ideation or homicidal ideation, no evidence of psychotic thought, no auditory hallucinations present, and no visual hallucinations present  Mood: \"anxious, sad , and depressed\"  Affect: mood congruent  Insight: good  Judgment: intact, adequate for safety  Impulse Control: intact  Oriented to: time, place, person, and situation  Attention Span and Concentration: Normal  Language: Intact  Recent and Remote Memory: Delayed & immediate recall intact  Fund of Knowledge/Assessment of Intelligence: Average  Capacity of Activities of Daily Living: Independent, able to participate in programmatic care services.    Diagnosis/es:    ICD-10-CM    1. Moderate episode of recurrent major depressive disorder (H)  F33.1       2. Generalized anxiety disorder  F41.1     "     Assessment/Plan:  Fabiana presents today for follow-up psychiatric evaluation and assessment of progress. She is uncertain about whether she is making progress but she showed notable improvement compared to the last appointment, where she believed to be ilan person and  her mental health situation hopeless. Today, she was more inquisitive about treatment options such as TMS and actively sought answers on how to manage catastrophizing and nervousness.  She denied any safety concerns and relies on her Gnosticism matthew as a source of support.     She will continue engaging in psychotherapy in [facility] to strengthen coping skills, peer support, mitigate safety risks, and enhance self-care. She will continue to see her outpatient provider for medication management.    Depression  Overall improved    Engage in psychotherapy  Continue to work with outpatient provider for medication management  Continue with current medication regimen  Improve sleep hygiene  Maintain a balanced diet  Engage in physical activities as tolerated    Anxiety  Overall improved    As noted above    Safety Assessment:  Fabiana reports suicidal ideation and/or non-suicidal self-injury or thoughts thereof as noted above  Fabiana is future-oriented and is engaged in treatment planning   I do not feel that Fabiana meets criteria for a 72-hour involuntary hold and remains appropriate for an outpatient level of care    KIRSTIN SINGH DNP on 3/5/2025 at 2:27 PM    Visit Details:  Type of service: In-person  Location (patient and provider): [facility] Adult Mental Health Outpatient Programmatic Care Offices    Level of Medical Decision Making:   - At least 1 chronic problem that is not stable  - Engaged in prescription drug management during visit (discussed any medication benefits, side effects, alternatives, etc.)  Discussion of management or test interpretation with external physician/other qualified healthcare professional/appropriate source - programmatic  care multidisciplinary treatment team    30 min spent on the date of the encounter in chart review, patient visit, review of tests, documentation, care coordination, and/or discussion with other providers about the issues documented above.      This document completed in part using [employer] dictation software and therefore may contain inadvertent word or phrase substitutions.

## 2025-03-05 NOTE — GROUP NOTE
Psychotherapy Group Note    PATIENT'S NAME: Fabiana Hu  MRN:   [id number]  :   1960  ACCT. NUMBER: [id number]  DATE OF SERVICE: 3/05/25  START TIME:  2:00 PM  END TIME:  2:50 PM  FACILITATOR: Darling Leiva LICSW  TOPIC: MH EBP Group: Coping Skills  [facility] Adult Mental Health Outpatient Programs  TRACK: IOP/DT 3 55+    NUMBER OF PARTICIPANTS: 6    Summary of Group / Topics Discussed:  Coping Skills: Additional Coping Skills:  Patients discussed and practiced cultivating humor.  Reviewed the benefits of applying the aforementioned coping strategies.  Patients explored how these strategies might be applied to daily stressors or distressing situations.    Patient Session Goals / Objectives:  Understand the purpose and benefits of applying humor coping strategies  Discussed strategies to cultivate humor  Engaged in experiential learning through watching funny video clips.  Address barriers to utilizing coping skills when in distress.      Patient Participation / Response:  Fully participated with the group by sharing personal reflections / insights and openly received / provided feedback with other participants.    Demonstrated understanding of topics discussed through group discussion and participation, Expressed understanding of the relevance / importance of coping skills at distressing times in life, Demonstrated knowledge of when to consider using a variety of coping skills in daily life, Identified barriers to applying coping skills, Identified 2-3 positive coping strategies that have helped maintain / improve symptoms in the past, and Identified / Expressed personal readiness to practice new coping skills    Treatment Plan:  Patient has a current master individualized treatment plan.  See Epic treatment plan for more information.    WENDY Currie

## 2025-03-05 NOTE — GROUP NOTE
"Process Group Note    PATIENT'S NAME: Fabiana Hu  MRN:   [id number]  :   1960  ACCT. NUMBER: [id number]  DATE OF SERVICE: 3/05/25  START TIME:  1:00 PM  END TIME:  1:50 PM  FACILITATOR: Darling Leiva LICSW  TOPIC:  Process Group    Diagnoses:  296.32 (F33.1) Major Depressive Disorder, Recurrent Episode, Moderate _ and With anxious distress  300.02 (F41.1) Generalized Anxiety Disorder.     [facility] Adult Mental Health Outpatient Programs  TRACK: IOP/DT 3 55+    NUMBER OF PARTICIPANTS: 6        Data:    Session content: At the start of this group, patients were invited to check in by identifying themselves, describing their current emotional status, and identifying issues to address in this group.   Area(s) of treatment focus addressed in this session included Symptom Management, Personal Safety, and Community Resources/Discharge Planning.  Reported mood is \"mixed, anxious\".  She reports ruminative thoughts about \"political stress\" and \"worry about our future\".   Client denied safety concerns, including SI and SIB. Client denies any chemical use.  Client is taking medications as prescribed. Client's goal is \"lower anxiety\".  Client reported plans to use the following coping skills: positive distraction, exercise, turning off the news. Writer explored volunteer opportunities as a way to reduce anxiety and increase sense of agency. Peers offered supportive feedback and validation.          3/5/2025     3:00 PM   Suicide Ideation Check In   Since last session, how often have you had suicidal thoughts? No thoughts of suicide             Therapeutic Interventions/Treatment Strategies:  Psychotherapist offered support, feedback and validation and reinforced use of skills. Treatment modalities used include Cognitive Behavioral Therapy and Dialectical Behavioral Therapy. Interventions include Coping Skills: Assisted patient in identifying 1-2 healthy distraction skills to reduce overall distress, Discussed " "how the use of intentional \"in the moment\" actions can help reduce distress, Reviewed patients current calming practices and discussed a more formal way of practicing and accessing skills, and Discussed importance of physical self-care to address mental health symptoms.    Assessment:    Patient response:   Patient responded to session by accepting feedback, giving feedback, listening, focusing on goals, being attentive, and accepting support    Possible barriers to participation / learning include: and no barriers identified    Health Issues:   Yes: Chronic disease management, No Psychological Distress       Substance Use Review:   Substance Use: No active concerns identified.    Mental Status/Behavioral Observations  Appearance:   Appropriate   Eye Contact:   Good   Psychomotor Behavior: Normal   Attitude:   Cooperative  Interested Friendly Pleasant  Orientation:   All  Speech   Rate / Production: Normal    Volume:  Normal   Mood:    Anxious   Affect:    Appropriate   Thought Content:   Rumination and Safety denies any current safety concerns including suicidal ideation, self-harm, and homicidal ideation  Thought Form:  Coherent  Logical     Insight:    Good     Plan:   Safety Plan: No current safety concerns identified.  Recommended that patient call 911 or go to the local ED should there be a change in any of these risk factors.   Barriers to treatment: None identified  Patient Contracts (see media tab):  None  Substance Use: Not addressed in session   Continue or Discharge: Patient will continue in 55+ Program (55+) as planned. Patient is likely to benefit from learning and using skills as they work toward the goals identified in their treatment plan.      WENDY Currie  March 5, 2025  "

## 2025-03-05 NOTE — GROUP NOTE
Psychoeducation Group Note    PATIENT'S NAME: Fabiana Hu  MRN:   [id number]  :   1960  ACCT. NUMBER: [id number]  DATE OF SERVICE: 3/05/25  START TIME:  3:00 PM  END TIME:  3:50 PM  FACILITATOR: Laura Franz LICSW; Chris Rowe OTR  TOPIC: MH Life Skills Group: Resiliency Development  [facility] Adult Mental Health Outpatient Programs  TRACK: IOP DT 3 55+     NUMBER OF PARTICIPANTS: 6    Summary of Group / Topics Discussed:    Resiliency Development:     ABCs of Coping: Reviewed signs of stress including physical changes, behavior changes, and changes to thoughts and emotions.  Provided education on the benefits of developing a robust coping plan to help manage distress and regulate emotions. Discussed the importance of having easy access to this plan in times of increased symptoms.  Brainstormed with group members to identify several coping skills for each letter of the alphabet in order to develop a list that includes variety and depth.  Prompted patients to identify at least one new skill from the list they are willing to try.       Patient Session Goals / Objectives:     Review the signs and symptoms of stress and establish a need for stress management strategies.   Identify benefits of having a robust coping plan to help manage distress and regulate emotions.   Develop a list of coping skills to promote self-regulation.   Establish a plan for practice of these skills in their own environments.      Patient Participation / Response:  Fully participated with the group by sharing personal reflections / insights and openly received / provided feedback with other participants.    Verbalized understanding of content, Patient would benefit from additional opportunities to practice the content to be able to generalize it to their everyday life with increased intentionality, consistency, and efficacy in support of their psychiatric recovery, and Patient worked towards initial treatment plan goals      Treatment Plan:  Patient has a current master individualized treatment plan.  See Epic treatment plan for more information.    KIRK BlakelySW

## 2025-03-06 ENCOUNTER — HOSPITAL ENCOUNTER (OUTPATIENT)
Dept: BEHAVIORAL HEALTH | Facility: CLINIC | Age: 65
End: 2025-03-06
Attending: PSYCHIATRY & NEUROLOGY
Payer: MEDICARE

## 2025-03-06 ENCOUNTER — THERAPY VISIT (OUTPATIENT)
Dept: PHYSICAL THERAPY | Facility: REHABILITATION | Age: 65
End: 2025-03-06
Payer: MEDICARE

## 2025-03-06 DIAGNOSIS — F41.1 GENERALIZED ANXIETY DISORDER: Primary | ICD-10-CM

## 2025-03-06 DIAGNOSIS — G20.A1 PARKINSON'S DISEASE, UNSPECIFIED WHETHER DYSKINESIA PRESENT, UNSPECIFIED WHETHER MANIFESTATIONS FLUCTUATE (H): Primary | ICD-10-CM

## 2025-03-06 PROCEDURE — 90853 GROUP PSYCHOTHERAPY: CPT | Performed by: SOCIAL WORKER

## 2025-03-06 PROCEDURE — 90853 GROUP PSYCHOTHERAPY: CPT

## 2025-03-06 NOTE — PROGRESS NOTES
DISCHARGE  Reason for Discharge: Patient has met all goals.    Equipment Issued: none    Discharge Plan: Patient to continue home program.  Educated to come back in 8-10 months or sooner if decline is noticed    Referring Provider:  Ramon Weston    03/06/25 0500   Appointment Info   Signing clinician's name / credentials Olive Thompson, SPT / Tory French, PT, DPT, MTC, NCS   Total/Authorized Visits 11/13   Medical Diagnosis Parkinson's disease, unspecified whether dyskinesia present, unspecified whether manifestations fluctuate   PT Tx Diagnosis Postural Instability, Impaired gait, LE weakness   Progress Note/Certification   Start of Care Date 11/20/24   Onset of illness/injury or Date of Surgery 10/10/24   Therapy Frequency 2x/wk   Predicted Duration 13 visits over 12 weeks   Certification date from 11/20/24   Certification date to 02/12/25   GOALS   PT Goals 2;3;4   PT Goal 1   Goal Description Pt will be independent with her HEP for ongoing symptom management in 12 weeks.   Goal Progress MET   Date Met 03/06/25   PT Goal 2   Goal Description Pt will improve LE functional strength from 13 stands to 15 stands in 12 weeks.   Goal Progress MET; 15 stands without UE   Date Met 01/27/25   PT Goal 3   Goal Description Pt will improve functional balance from 19/28 on the miniBEST to 24/28 on the mini BEST in 12 weeks.   Goal Progress MET 26/28   Target Date 03/06/25   Subjective Report   Subjective Report Pt states she is doing well, she had another surgery to remove mole but is recovering well. She is feeling good about her HEP and is thinking she is ready for d/c today.   Treatment Interventions (PT)   Interventions Self Care/Home Management   Therapeutic Procedure/Exercise   Therapeutic Procedures: strength, endurance, ROM, flexibility minutes (77060) 10   Ther Proc 1 NuStep for activity tolerance and trunk rigidity   Ther Proc 1 - Details 8 mins, workload #5, seat 5, arms 5, keep SPM >60   Ther Proc 2  Big walking   Ther Proc 2 - Details not today   PTRx Ther Proc 1 PD: Seated Low to High   PTRx Ther Proc 1 - Details verbally discussed   PTRx Ther Proc 2 PD: Seated Arm Sweeps   PTRx Ther Proc 2 - Details verbally discussed   PTRx Ther Proc 3 birddog   PTRx Ther Proc 3 - Details verbally discussed visual cues and regression/progression discussed.    PTRx Ther Proc 4 Rowing with Shoulders Up and Back   PTRx Ther Proc 4 - Details verbally discussed   PTRx Ther Proc 5 Neutral Spine Standing   PTRx Ther Proc 5 - Details verbally discussed   PTRx Ther Proc 6 Shoulder Horizontal Abduction/Diagonals With Theraband   PTRx Ther Proc 6 - Details verbally discussed   PTRx Ther Proc 7 Prone Scapula I   PTRx Ther Proc 7 - Details verbally discussed   PTRx Ther Proc 8 Prone Scapula T   PTRx Ther Proc 8 - Details verbally discussed   PTRx Ther Proc 9 Prone Scapula Y   PTRx Ther Proc 9 - Details verbally discussed   Therapeutic Activity   Ther Act 1 Bed Mobility   Ther Act 1 - Details not today   Neuromuscular Re-education   Neuromuscular re-ed of mvmt, balance, coord, kinesthetic sense, posture, proprioception minutes (50634) 10   Neuromuscular Re-education Neuro Re-ed 2;Neuro Re-ed 3;Neuro Re-ed 4   Neuro Re-ed 1 Power arms swing in semi tandem   Neuro Re-ed 1 - Details not today   Neuro Re-ed 2 stepping stone obstacles   Neuro Re-ed 2 - Details not today   Neuro Re-ed 3 Trunk rotation ball on wall   Neuro Re-ed 3 - Details not today   Neuro Re-ed 4 Blaze pods fast feet   Neuro Re-ed 4 - Details not today   PTRx Neuro Re-ed 1 PD: Foot Forward with Arms   PTRx Neuro Re-ed 1 - Details verbally discussed   PTRx Neuro Re-ed 2 PD: Foot to Side with Arms   PTRx Neuro Re-ed 2 - Details verbally disucssed   PTRx Neuro Re-ed 3 PD: Foot Backwards with Arms   PTRx Neuro Re-ed 3 - Details verbally discussed   PTRx Neuro Re-ed 4 PD: Forward Weight Shift with Legs and Arms   PTRx Neuro Re-ed 4 - Details x 20 reps with ea side. very good  "coordination and balance throughout. Cues to keep feet wide and to step big. Cues for \"heel up toe up heel up toe up\" helpful for pt to maintain rhythm   PTRx Neuro Re-ed 5 Pallof Press   PTRx Neuro Re-ed 5 - Details verbally discussed   Eval/Assessments   Assessments Physical Performance Test/Measures   Physical Performance Test/measures   Physical Performance Test/Measurement, Minutes (98790) 20   Physical Performance Test/Measurement Details miniBEST   Skilled Intervention Time spent administering test, interpreting results, and discussing findings with pt. All pt questions asked and answered.   Patient Response/Progress scored 26/28   Education   Learner/Method Patient   Education Comments POC, pt education on continuing in 8-10 months or earlier if needed, HEP and all questions asked and answered.   Plan   Home program PTrx   Updates to plan of care d/c   Plan for next session D/c   Comments   Comments Pt returns for subsequent Rx session. Todays session focused reassessing miniBEST, educating pt on findings, going ofver HEP and answering all questions pt had. Pt tolerated session well and has met all goals and is appropriate for d/c for PT at this time. Pt educated on return in future for more visits in 8-10 months or earlier if decline is noticed.   Total Session Time   Timed Code Treatment Minutes 40   Total Treatment Time (sum of timed and untimed services) 40     "

## 2025-03-06 NOTE — GROUP NOTE
Psychoeducation Group Note    PATIENT'S NAME: Fabiana Hu  MRN:   [id number]  :   1960  ACCT. NUMBER: [id number]  DATE OF SERVICE: 3/06/25  START TIME:  3:00 PM  END TIME:  3:50 PM  FACILITATOR: Mallory Sanchez LICSW; Althea Murcia RN  TOPIC:  Wellness Group: [facility] Outpatient [facility]  TRACK: IOP/DT 3    NUMBER OF PARTICIPANTS: 5    Summary of Group / Topics Discussed:  Foundations of Health: Nutrition: Super Nutrients & Micronutrients: Super Nutrients are Foods that have high nutritional yield. Micronutrients are essential elements found in food or taken through supplements that are necessary for normal physiological functioning. This group was designed to complement the macronutrients group and build upon previous education. The changes that food makes on the brain (how the brain uses sugar) and nutrition as it relates to mental health was also discussed.       Patient Session Goals / Objectives:  Identified the health enhancing benefits to good nutrition  Verbalized ways in which the food we eat affects the brain  Explained the role of micronutrients in the body, how much we need, and how to get it      Patient Participation / Response:  Fully participated with the group by sharing personal reflections / insights and openly received / provided feedback with other participants.    Demonstrated understanding of topics discussed through group discussion and participation and Verbalized understanding of foundations of health topic    Treatment Plan:  Patient has a current master individualized treatment plan.  See Epic treatment plan for more information.    WENDY Mcdermott

## 2025-03-06 NOTE — GROUP NOTE
Psychotherapy Group Note    PATIENT'S NAME: Fabiana Hu  MRN:   [id number]  :   1960  ACCT. NUMBER: [id number]  DATE OF SERVICE: 3/06/25  START TIME:  2:00 PM  END TIME:  2:50 PM  FACILITATOR: Darling Leiva LICSW  TOPIC: MH EBP Group: Behavioral Activation  [facility] Adult Mental Health Outpatient Programs  TRACK: IOP/DT 3 55+    NUMBER OF PARTICIPANTS: 6    Summary of Group / Topics Discussed:  Behavioral Activation: [place] Ahead: {Patients identified situations that prompt unwanted and unhelpful emotions / thoughts / behaviors.   Patients discussed how to problem solve by proactively using coping skills in potentially difficult situations. Components included describing the situation, brainstorming coping skills, imagining how scenario can/will unfold, rehearsing the action plan, and practicing relaxation to follow.  Patients practiced using these skills to reduce symptom distress and increase effective coping  behaviors.      Patient Session Goals / Objectives:  Identify difficult situation(s), and gain proficiency with alternative behaviors / skills to problem solve.  Increase confidence using coping skills through group practice in session.  Receive and provide feedback regarding skill development.  Apply coping skills in daily life situations.      Patient Participation / Response:  Fully participated with the group by sharing personal reflections / insights and openly received / provided feedback with other participants.    Demonstrated understanding of topics discussed through group discussion and participation, Expressed understanding of the relationship between behaviors, thoughts, and feelings, Shared experiences and challenges with making behavioral changes, Identified barriers to change, Identified / Expressed personal readiness to make behavioral change, Identified ways to increase goal directed activities, and Practiced skills in session    Treatment Plan:  Patient has a current master  individualized treatment plan.  See Epic treatment plan for more information.    KIRK CurrieSW

## 2025-03-06 NOTE — GROUP NOTE
"Process Group Note    PATIENT'S NAME: Fabiana Hu  MRN:   [id number]  :   1960  ACCT. NUMBER: [id number]  DATE OF SERVICE: 3/06/25  START TIME:  1:00 PM  END TIME:  1:50 PM  FACILITATOR: Darling Leiva LICSW  TOPIC:  Process Group    Diagnoses:  296.32 (F33.1) Major Depressive Disorder, Recurrent Episode, Moderate _ and With anxious distress  300.02 (F41.1) Generalized Anxiety Disorder.     [facility] Adult Mental [facility] Outpatient Programs  TRACK: IOP/DT 3 55+    NUMBER OF PARTICIPANTS: 5        Data:    Session content: At the start of this group, patients were invited to check in by identifying themselves, describing their current emotional status, and identifying issues to address in this group.   Area(s) of treatment focus addressed in this session included Symptom Management, Personal Safety, and Community Resources/Discharge Planning.  Reported mood is \"tired, I got poor sleep\".  She reports a goal to take the recommendation of her therapist to track her sleep. She gave herself credit for practicing good sleep hygiene, although she'd like to improve the consistency of her bed time. She reports using meditation and a sound machine to self-soothe before bed. Client denied safety concerns, including SI and SIB. Client denies any chemical use.  Client is taking medications as prescribed.  Client is grateful for \"it's aldair\". Peers offered supportive feedback and validation.          3/6/2025     3:00 PM   Suicide Ideation Check In   Since last session, how often have you had suicidal thoughts? No thoughts of suicide         Therapeutic Interventions/Treatment Strategies:  Psychotherapist offered support, feedback and validation and reinforced use of skills. Treatment modalities used include Cognitive Behavioral Therapy and Dialectical Behavioral Therapy. Interventions include Coping Skills: Discussed use of self-soothe skills to decrease distress in the body and Discussed importance of physical " self-care to address mental health symptoms, Symptoms Management: Promoted understanding of their diagnoses and how it impacts their functioning, and Emotions Management:  Increased awareness of daily mood patterns/changes.    Assessment:    Patient response:   Patient responded to session by accepting feedback, giving feedback, listening, focusing on goals, being attentive, and accepting support    Possible barriers to participation / learning include: and no barriers identified    Health Issues:   Yes: Sleep disturbance, Associated Psychological Distress  Chronic disease management, No Psychological Distress       Substance Use Review:   Substance Use: No active concerns identified.    Mental Status/Behavioral Observations  Appearance:   Appropriate   Eye Contact:   Good   Psychomotor Behavior: Normal   Attitude:   Cooperative  Interested Friendly Pleasant  Orientation:   All  Speech   Rate / Production: Normal    Volume:  Normal   Mood:    Anxious  TIred  Affect:    Appropriate   Thought Content:   Rumination and Safety denies any current safety concerns including suicidal ideation, self-harm, and homicidal ideation  Thought Form:  Coherent  Logical     Insight:    Good     Plan:   Safety Plan: No current safety concerns identified.  Recommended that patient call 911 or go to the local ED should there be a change in any of these risk factors.   Barriers to treatment: None identified  Patient Contracts (see media tab):  None  Substance Use: Not addressed in session   Continue or Discharge: Patient will continue in 55+ Program (55+) as planned. Patient is likely to benefit from learning and using skills as they work toward the goals identified in their treatment plan.      Darling Leiva, WENDY  March 6, 2025

## 2025-03-10 ENCOUNTER — HOSPITAL ENCOUNTER (OUTPATIENT)
Dept: BEHAVIORAL HEALTH | Facility: CLINIC | Age: 65
End: 2025-03-10
Attending: PSYCHIATRY & NEUROLOGY
Payer: MEDICARE

## 2025-03-10 DIAGNOSIS — F41.1 GENERALIZED ANXIETY DISORDER: Primary | ICD-10-CM

## 2025-03-10 PROCEDURE — 90853 GROUP PSYCHOTHERAPY: CPT

## 2025-03-10 PROCEDURE — 90853 GROUP PSYCHOTHERAPY: CPT | Performed by: SOCIAL WORKER

## 2025-03-10 NOTE — GROUP NOTE
Psychoeducation Group Note    PATIENT'S NAME: Fabiana Hu  MRN:   [id number]  :   1960  ACCT. NUMBER: [id number]  DATE OF SERVICE: 3/10/25  START TIME:  3:00 PM  END TIME:  3:50 PM  FACILITATOR: Mallory Sanchez LICSW; Zulema Sy RN  TOPIC:  Wellness Group: [facility] Adult Mental Health Outpatient Programs  TRACK: IOP/DT 3    NUMBER OF PARTICIPANTS: 5    Summary of Group / Topics Discussed:  Foundations of Health: Nutrition: Super Nutrients & Micronutrients Part Two: Super Nutrients are Foods that have high nutritional yield. Micronutrients are essential elements found in food or taken through supplements that are necessary for normal physiological functioning. This group was designed to complement the macronutrients group and build upon previous education. The changes that food makes on the brain (how the brain uses sugar) and nutrition as it relates to mental health was also discussed.       Patient Session Goals / Objectives:  Identified the health enhancing benefits to good nutrition  Verbalized ways in which the food we eat affects the brain  Explained the role of micronutrients in the body, how much we need, and how to get it      Patient Participation / Response:  Fully participated with the group by sharing personal reflections / insights and openly received / provided feedback with other participants.    Demonstrated understanding of topics discussed through group discussion and participation and Verbalized understanding of foundations of health topic    Treatment Plan:  Patient has a current master individualized treatment plan.  See Epic treatment plan for more information.    WENDY Mcdermott

## 2025-03-10 NOTE — GROUP NOTE
Psychoeducation Group Note    PATIENT'S NAME: Fabiana Hu  MRN:   [id number]  :   1960  ACCT. NUMBER: [id number]  DATE OF SERVICE: 3/10/25  START TIME:  2:00 PM  END TIME:  2:50 PM  FACILITATOR: Darling Leiva LICSW  TOPIC: MH Life Skills Group: Lifestyle Balance and Structure  [facility]  TRACK: IOP/DT 3 55+    NUMBER OF PARTICIPANTS: 5    Summary of Group / Topics Discussed:  Lifestyle Balance and Strucure:  Goal-setting & integration: Patients were introduced to the process and benefits of setting realistic, timely, and achievable goals to help support their ability to follow through with meaningful personal, health, recovery and treatment goals that they would like to achieve to improve overall functioning.  Patients were also taught and then practiced techniques to manage a variety of obstacles to achieve their goals and how to break goals into manageable pieces.  Patients also reported on follow through of goals.     Patient Session Goals / Objectives:  Facilitated the creation of a vision for recovery and wellbeing  Identified and wrote meaningful SMART goals to engage in meaningful activities of daily living   Identified and problem solved barriers to achieving goals   Identified plan to support follow through on goals and reflection on progress made      Patient Participation / Response:  Fully participated with the group by sharing personal reflections / insights and openly received / provided feedback with other participants.    Verbalized understanding of content and Patient made progress towards meeting ITP goal number all    Treatment Plan:  Patient has a current master individualized treatment plan.  See Epic treatment plan for more information.    WENDY Currie

## 2025-03-11 NOTE — GROUP NOTE
"Process Group Note    PATIENT'S NAME: Fabiana Hu  MRN:   [id number]  :   1960  ACCT. NUMBER: [id number]  DATE OF SERVICE: 3/10/25  START TIME:  1:00 PM  END TIME:  1:50 PM  FACILITATOR: Darling Leiva LICSW  TOPIC:  Process Group    Diagnoses:  296.32 (F33.1) Major Depressive Disorder, Recurrent Episode, Moderate _ and With anxious distress  300.02 (F41.1) Generalized Anxiety Disorder.     [facility] Adult Mental Health Outpatient Programs  TRACK: IOP/DT 3 55+    NUMBER OF PARTICIPANTS: 5        Data:    Session content: At the start of this group, patients were invited to check in by identifying themselves, describing their current emotional status, and identifying issues to address in this group.   Area(s) of treatment focus addressed in this session included Symptom Management, Personal Safety, and Community Resources/Discharge Planning.  Reported mood is \"stuck\", anxious.  She added, \"I don't know if [group] suits me\".  She named two barriers to engaging in group: vulnerability and identifying goals.  Writer and peers reflected observations and affirmed that she was overcoming these barriers.  Client denied safety concerns, including SI and SIB. Client denies any chemical use.  Client is taking medications as prescribed. Client's goal is \"revisit my social connection goal. Schedule a walk with a friend\".  Client reported plans to use the following coping skills: \"vulnerability with trusted supports, exercise, cope ahead\".             3/10/2025     1:00 PM   Suicide Ideation Check In   Since last session, how often have you had suicidal thoughts? No thoughts of suicide         Therapeutic Interventions/Treatment Strategies:  Psychotherapist offered support, feedback and validation and reinforced use of skills. Treatment modalities used include Cognitive Behavioral Therapy and Dialectical Behavioral Therapy. Interventions include Behavioral Activation: Explored how behaviors effect mood and " "interact with thoughts and feelings and Encouraged strategies to reduce individual procrastination and increase motivation by increasing goal-directed activities to enhance mood and reduce symptoms. and Coping Skills: Discussed use of self-soothe skills to decrease distress in the body, Discussed how the use of intentional \"in the moment\" actions can help reduce distress, and Assisted patient in understanding the purpose of planning / creating / participating / sharing in positive experiences.    Assessment:    Patient response:   Patient responded to session by accepting feedback, giving feedback, listening, focusing on goals, being attentive, and accepting support    Possible barriers to participation / learning include: and no barriers identified    Health Issues:   Yes: Chronic disease management, No Psychological Distress       Substance Use Review:   Substance Use: No active concerns identified.    Mental Status/Behavioral Observations  Appearance:   Appropriate   Eye Contact:   Good   Psychomotor Behavior: Normal   Attitude:   Cooperative  Interested Friendly Pleasant  Orientation:   All  Speech   Rate / Production: Normal    Volume:  Normal   Mood:    Anxious  \"Stuck\"  Affect:    Appropriate   Thought Content:   Rumination and Safety denies any current safety concerns including suicidal ideation, self-harm, and homicidal ideation  Thought Form:  Coherent  Logical     Insight:    Good     Plan:   Safety Plan: No current safety concerns identified.  Recommended that patient call 911 or go to the local ED should there be a change in any of these risk factors.   Barriers to treatment: None identified  Patient Contracts (see media tab):  None  Substance Use: Not addressed in session   Continue or Discharge: Patient will continue in 55+ Program (55+) as planned. Patient is likely to benefit from learning and using skills as they work toward the goals identified in their treatment plan.      Darling Leiva, " LICSW  March 10, 2025

## 2025-03-12 ENCOUNTER — HOSPITAL ENCOUNTER (OUTPATIENT)
Dept: BEHAVIORAL HEALTH | Facility: CLINIC | Age: 65
End: 2025-03-12
Attending: PSYCHIATRY & NEUROLOGY
Payer: MEDICARE

## 2025-03-12 DIAGNOSIS — F41.1 GENERALIZED ANXIETY DISORDER: Primary | ICD-10-CM

## 2025-03-12 PROCEDURE — 90853 GROUP PSYCHOTHERAPY: CPT

## 2025-03-12 PROCEDURE — 90853 GROUP PSYCHOTHERAPY: CPT | Performed by: SOCIAL WORKER

## 2025-03-12 NOTE — GROUP NOTE
Psychoeducation Group Note    PATIENT'S NAME: Fabiana Hu  MRN:   [id number]  :   1960  ACCT. NUMBER: [id number]  DATE OF SERVICE: 3/12/25  START TIME:  3:00 PM  END TIME:  3:50 PM  FACILITATOR: Mallory Sanchez LICSW  TOPIC: MH Life Skills Group: Lifestyle Balance and Structure  [facility] Outpatient Programs  TRACK: IOP/DT 3    NUMBER OF PARTICIPANTS: 6    Summary of Group / Topics Discussed:  Lifestyle Balance and Strucure:  Lifestyle Balance and Structure: Life Balance: Patients were introduced to the importance of leisure, self-care, productivity, and social participation in support of mental health management by exploring a balanced lifestyle.  Patients identified how they spend their time and skills to bring this into better balance.  Patients identified a self-care, productive, leisure, and social participation activity that they would like to complete this week. Patients practiced the skill of planning to identify when and where they could complete these activities. Patients shared with their peers their plans for the week. Validation, support, and guidance provided throughout group.     Patient Session Goals / Objectives:   Discussed the importance of life balance, structure, and routine.   Identified and planned 4 activities in the areas of leisure, productivity, social participation and self-care.   Self-reflected on the benefits of structure and routine in their life.       Patient Participation / Response:  Fully participated with the group by sharing personal reflections / insights and openly received / provided feedback with other participants.    Verbalized understanding of content    Treatment Plan:  Patient has a current master individualized treatment plan.  See Epic treatment plan for more information.    WENDY Mcdermott

## 2025-03-12 NOTE — GROUP NOTE
"Process Group Note    PATIENT'S NAME: Fabiana Hu  MRN:   [id number]  :   1960  ACCT. NUMBER: [id number]  DATE OF SERVICE: 3/12/25  START TIME:  1:00 PM  END TIME:  1:50 PM  FACILITATOR: Darling Leiva LICSW  TOPIC:  Process Group    Diagnoses:  296.32 (F33.1) Major Depressive Disorder, Recurrent Episode, Moderate _ and With anxious distress  300.02 (F41.1) Generalized Anxiety Disorder.     [facility] Adult Mental Health Outpatient Programs  TRACK: IOP/DT 3 55+    NUMBER OF PARTICIPANTS: 8        Data:    Session content: At the start of this group, patients were invited to check in by identifying themselves, describing their current emotional status, and identifying issues to address in this group.   Area(s) of treatment focus addressed in this session included Symptom Management, Personal Safety, and Community Resources/Discharge Planning.  Reported mood is \"stuck\", depressed, anxious, anhedonic.  She was inspired by her sister to think about doing activities that she used to enjoy.  She brainstormed a goal to read for 15 minutes this evening.  She also talked about volunteering, walking and planning a date night.   Client denied safety concerns, including SI and SIB. Client denies any chemical use.  Client is taking medications as prescribed.Client reported plans to use the following coping skills: break things down into smaller parts, putting things on her calendar. Client is grateful for the group and aldair weather. Peers offered supportive feedback and validation.            3/12/2025     3:00 PM   Suicide Ideation Check In   Since last session, how often have you had suicidal thoughts? No thoughts of suicide         Therapeutic Interventions/Treatment Strategies:  Psychotherapist offered support, feedback and validation and reinforced use of skills. Treatment modalities used include Cognitive Behavioral Therapy and Dialectical Behavioral Therapy. Interventions include Behavioral Activation: " "Explored how behaviors effect mood and interact with thoughts and feelings and Encouraged strategies to reduce individual procrastination and increase motivation by increasing goal-directed activities to enhance mood and reduce symptoms. and Coping Skills: Assisted patient in understanding the purpose of planning / creating / participating / sharing in positive experiences.    Assessment:    Patient response:   Patient responded to session by accepting feedback, giving feedback, listening, focusing on goals, being attentive, and accepting support    Possible barriers to participation / learning include: and no barriers identified    Health Issues:   Yes: Chronic disease management, No Psychological Distress       Substance Use Review:   Substance Use: No active concerns identified.    Mental Status/Behavioral Observations  Appearance:   Appropriate   Eye Contact:   Good   Psychomotor Behavior: Normal   Attitude:   Cooperative  Interested Friendly Pleasant  Orientation:   All  Speech   Rate / Production: Normal    Volume:  Normal   Mood:    Anxious  Depressed  Anhedonia \"Stuck\"  Affect:    Appropriate   Thought Content:   Rumination and Safety denies any current safety concerns including suicidal ideation, self-harm, and homicidal ideation  Thought Form:  Coherent  Logical     Insight:    Good     Plan:   Safety Plan: No current safety concerns identified.  Recommended that patient call 911 or go to the local ED should there be a change in any of these risk factors.   Barriers to treatment: None identified  Patient Contracts (see media tab):  None  Substance Use: Not addressed in session   Continue or Discharge: Patient will continue in 55+ Program (55+) as planned. Patient is likely to benefit from learning and using skills as they work toward the goals identified in their treatment plan.      Darling Leiva, [facility]  March 12, 2025  "

## 2025-03-12 NOTE — GROUP NOTE
Psychotherapy Group Note    PATIENT'S NAME: Fabiana Hu  MRN:   [id number]  :   1960  ACCT. NUMBER: [id number]  DATE OF SERVICE: 3/12/25  START TIME:  2:00 PM  END TIME:  2:50 PM  FACILITATOR: Darling Leiva LICSW  TOPIC: MH EBP Group: Cognitive Restructuring  [facility] Adult Mental Health Outpatient Programs  TRACK: IOP/DT 3 55+    NUMBER OF PARTICIPANTS: 6    Summary of Group / Topics Discussed:  Cognitive Restructuring: Distortions: Patients received an overview of how to identify common cognitive distortions. Patients will explore alternatives to cognitive distortions and practice challenging their negative thought patterns. The goal is to help patients target modify ineffective thought patterns.     Patient Session Goals / Objectives:  Familiarized self with ineffective / unhealthy thoughts and how they develop.    Explored impact of ineffective thoughts / distortions on mood and activity  Formulated new neutral/positive alternatives to challenge less helpful / ineffective thoughts.  Practiced and plan to apply in daily life             Patient Participation / Response:  Fully participated with the group by sharing personal reflections / insights and openly received / provided feedback with other participants.    Demonstrated understanding of topics discussed through group discussion and participation, Expressed understanding of the relationship between behaviors, thoughts, and feelings, Demonstrated knowledge of personal thought patterns and how they impact their mood and behavior., Identified barriers to changing thought patterns, Identified / Expressed personal readiness to practice CBT, and Verbalized understanding of patient's own thought patterns and how they impact their mood and behaviors    Treatment Plan:  Patient has a current master individualized treatment plan.  See Epic treatment plan for more information.    WENDY Currie

## 2025-03-13 ENCOUNTER — OFFICE VISIT (OUTPATIENT)
Dept: NEUROLOGY | Facility: CLINIC | Age: 65
End: 2025-03-13
Payer: MEDICARE

## 2025-03-13 DIAGNOSIS — K11.7 DISTURBANCE OF SALIVARY SECRETION: Primary | ICD-10-CM

## 2025-03-13 NOTE — LETTER
3/13/2025       RE: Fabiana Hu   [address]  [facility] [address]     Dear Colleague,    Thank you for referring your patient, Fabiana Hu, to the [facility]UROLOG[facility] [place] at [facility]. Please see a copy of my visit note below.    Movement Disorders Botulinum Toxin Clinic Note    History of Present Illness:  Fabiana Hu is a 64 year old female who presents to clinic for botulinum toxin injections for treatment of sialorrhea secondary to Parkinson's disease.     Date of last injection: 24    Onset of effect after injections:  uncertain, few weeks    Response from last injections:  Thinks that it worked well    Wearing off: Worn off at this point but not sure how long it lasted    Side effects: She reports no side effects.    Additional interval history: No    Patient denies new medical diagnoses, illnesses, hospitalizations, emergency room visits, and injuries since the previous injection with botulinum neurotoxin.       Physical Examination:  Vital Signs:   vitals were not taken for this visit.   No drooling apparent  Masked facies    BOTULINUM NEUROTOXIN INJECTION PROCEDURES:    VERIFICATION OF PATIENT IDENTIFICATION AND PROCEDURE     Initials   Patient Name KD   Patient  KD   Procedure Verified by: KATJA     Prior to the start of the procedure and with procedural staff participation, I verbally confirmed the patient s identity using two indicators, relevant allergies, that the procedure was appropriate and matched the consent or emergent situation, and that the correct equipment/implants were available. Immediately prior to starting the procedure I conducted the Time Out with the procedural staff and re-confirmed the patient s name, procedure, and site/side. (The Joint Commission universal protocol was followed.)  Yes    Sedation (Moderate or Deep): None      Above assessments performed by:  Provider: Nancy Edgar  DO    INDICATION/S FOR PROCEDURE/S:  Fabiana Hu is a 64 year old year old patient with sialorrhea secondary to Parkinson's disease with associated symptoms of bothersome drooling on clothes, face .     Her baseline symptoms have been recalcitrant to oral medications and conservative therapy.  She is here today for an injection of Myobloc.      GOAL OF PROCEDURE:  The goal of this procedure is to decrease excessive drooling  associated with sialorrhea.    TOTAL DOSE ADMINISTERED:  Dose Administered: 2750 units Myobloc    Diluent Used:  Preservative Free Normal Saline  Total Volume of Diluent Used: 1 mL    CONSENT:  The risks, benefits, and treatment options were discussed with Fabiana Hu and she agreed to proceed.      Written consent was obtained by KD.     EQUIPMENT USED:  Needle-30 gauge    SKIN PREPARATION:  Skin preparation was performed using an alcohol wipe.    GUIDANCE DESCRIPTION:  Guidance was not utilized for this procedure     AREA/MUSCLE INJECTED:    Muscles Injected Units Injected Number of Injections   Right Parotid 1250 (1250) 1   Left Parotid 1500 (1250) 1             Total Units Injected: 2750    Unavoidable Waste: 2250    Total Units Billed 5000    ( ) = previous dose    The patient tolerated the injections without difficulty.      Assessment:    Fabiana Hu is a 64 year old female with sialorrhea secondary to Parkinson's disease.  Today we did repeat botulinum toxin injections.    Plan  Message via [place] or call the clinic in 4-6 weeks for an update  Avoid heat and cold pack and massaging the areas injected for 24 hours post injections  Follow-up in 3 and 6 months to consider repeat injections    Patient seen and discussed with Dr. Herve Decker MD  Neuromodulation/Movement Disorders Fellow       I was present for the entire Botulinum toxin injection performed on 03/13/2025 and reported by Dr. Decker and was available to take over any critical portions of the exam as needed.  I agree  entirely of the report and impression as recorded by Dr. Decker.     Nancy Edgar DO  Movement Disorders Specialist  [facility]             Again, thank you for allowing me to participate in the care of your patient.      Sincerely,    Nancy Edgar DO

## 2025-03-13 NOTE — PROGRESS NOTES
Movement Disorders Botulinum Toxin Clinic Note    History of Present Illness:  Fabiana Hu is a 64 year old female who presents to clinic for botulinum toxin injections for treatment of sialorrhea secondary to Parkinson's disease.     Date of last injection: 24    Onset of effect after injections:  uncertain, few weeks    Response from last injections:  Thinks that it worked well    Wearing off: Worn off at this point but not sure how long it lasted    Side effects: She reports no side effects.    Additional interval history: No    Patient denies new medical diagnoses, illnesses, hospitalizations, emergency room visits, and injuries since the previous injection with botulinum neurotoxin.       Physical Examination:  Vital Signs:   vitals were not taken for this visit.   No drooling apparent  Masked facies    BOTULINUM NEUROTOXIN INJECTION PROCEDURES:    VERIFICATION OF PATIENT IDENTIFICATION AND PROCEDURE     Initials   Patient Name KD   Patient  KD   Procedure Verified by: KATJA     Prior to the start of the procedure and with procedural staff participation, I verbally confirmed the patient s identity using two indicators, relevant allergies, that the procedure was appropriate and matched the consent or emergent situation, and that the correct equipment/implants were available. Immediately prior to starting the procedure I conducted the Time Out with the procedural staff and re-confirmed the patient s name, procedure, and site/side. (The Joint Commission universal protocol was followed.)  Yes    Sedation (Moderate or Deep): None      Above assessments performed by:  Provider: Nancy Edgar DO    INDICATION/S FOR PROCEDURE/S:  Fabiana Hu is a 64 year old year old patient with sialorrhea secondary to Parkinson's disease with associated symptoms of bothersome drooling on clothes, face .     Her baseline symptoms have been recalcitrant to oral medications and conservative therapy.  She is here today for an  injection of Myobloc.      GOAL OF PROCEDURE:  The goal of this procedure is to decrease excessive drooling  associated with sialorrhea.    TOTAL DOSE ADMINISTERED:  Dose Administered: 2750 units Myobloc    Diluent Used:  Preservative Free Normal Saline  Total Volume of Diluent Used: 1 mL    CONSENT:  The risks, benefits, and treatment options were discussed with Fabiana Hu and she agreed to proceed.      Written consent was obtained by KD.     EQUIPMENT USED:  Needle-30 gauge    SKIN PREPARATION:  Skin preparation was performed using an alcohol wipe.    GUIDANCE DESCRIPTION:  Guidance was not utilized for this procedure     AREA/MUSCLE INJECTED:    Muscles Injected Units Injected Number of Injections   Right Parotid 1250 (1250) 1   Left Parotid 1500 (1250) 1             Total Units Injected: 2750    Unavoidable Waste: 2250    Total Units Billed 5000    ( ) = previous dose    The patient tolerated the injections without difficulty.      Assessment:    Fabiana Hu is a 64 year old female with sialorrhea secondary to Parkinson's disease.  Today we did repeat botulinum toxin injections.    Plan  Message via [place] or call the clinic in 4-6 weeks for an update  Avoid heat and cold pack and massaging the areas injected for 24 hours post injections  Follow-up in 3 and 6 months to consider repeat injections    Patient seen and discussed with Dr. Herve Decker MD  Neuromodulation/Movement Disorders Fellow       I was present for the entire Botulinum toxin injection performed on 03/13/2025 and reported by Dr. Decker and was available to take over any critical portions of the exam as needed.  I agree entirely of the report and impression as recorded by Dr. Decker.     Nancy Edgar DO  Movement Disorders Specialist  [facility]

## 2025-03-19 ENCOUNTER — HOSPITAL ENCOUNTER (OUTPATIENT)
Dept: BEHAVIORAL HEALTH | Facility: CLINIC | Age: 65
End: 2025-03-19
Attending: PSYCHIATRY & NEUROLOGY
Payer: MEDICARE

## 2025-03-19 ENCOUNTER — TELEPHONE (OUTPATIENT)
Dept: NEUROLOGY | Facility: CLINIC | Age: 65
End: 2025-03-19
Payer: MEDICARE

## 2025-03-19 DIAGNOSIS — F41.1 GENERALIZED ANXIETY DISORDER: Primary | ICD-10-CM

## 2025-03-19 PROCEDURE — 90853 GROUP PSYCHOTHERAPY: CPT

## 2025-03-19 PROCEDURE — 90853 GROUP PSYCHOTHERAPY: CPT | Performed by: SOCIAL WORKER

## 2025-03-19 PROCEDURE — 90853 GROUP PSYCHOTHERAPY: CPT | Performed by: PSYCHOLOGIST

## 2025-03-19 NOTE — GROUP NOTE
"Process Group Note    PATIENT'S NAME: Fabiana Hu  MRN:   [id number]  :   1960  ACCT. NUMBER: [id number]  DATE OF SERVICE: 3/19/25  START TIME:  1:00 PM  END TIME:  1:50 PM  FACILITATOR: Ivette Trevino Psy.D, LP  TOPIC:  Process Group    Diagnoses:    296.32 (F33.1) Major Depressive Disorder, Recurrent Episode, Moderate _ and With anxious distress  300.02 (F41.1) Generalized Anxiety Disorder.   [facility] Adult Mental Health Outpatient Programs  TRACK: iop/dt3      NUMBER OF PARTICIPANTS: 8        Data:    Session content: At the start of this group, patients were invited to check in by identifying themselves, describing their current emotional status, and identifying issues to address in this group.   Area(s) of treatment focus addressed in this session included Symptom Management, Personal Safety, and Community Resources/Discharge Planning.  Client reported being safe today.  Reported mood is \"good, \"   Goal for today is to attend group therapy. The client talked to the group about how she is doing things on her chore list and getting things done. She reported feeling better and using time management skills to do things. She reported alternatives help when dealing with arguments and finding solutions.               Suicidal Ideation Check-in    Since last session, how often have you had suicidal thoughts? 0-none, 1- 2-3-4-5    0         Therapeutic Interventions/Treatment Strategies:  Psychotherapist reinforced use of skills. Treatment modalities used include Cognitive Behavioral Therapy and Dialectical Behavioral Therapy. Interventions include Coping Skills: Assisted patient in identifying 1-2 healthy distraction skills to reduce overall distress and Promoted understanding of how and when to apply grounding strategies to reduce distress and increase presence in the moment, Relapse Prevention: Facilitated understanding of effective coping skills in response to triggers for substance use, and " Emotions Management:  Reinforced the purpose and biological basis of emotions.    Assessment:    Patient response:   Patient responded to session by giving feedback and focusing on goals    Possible barriers to participation / learning include: severity of symptoms    Health Issues:   None reported       Substance Use Review:   Substance Use: No active concerns identified.    Mental Status/Behavioral Observations  Appearance:   Appropriate   Eye Contact:   Good   Psychomotor Behavior: Normal   Attitude:   Cooperative   Orientation:   All  Speech   Rate / Production: Normal    Volume:  Normal   Mood:    Anxious   Affect:    Constricted   Thought Content:   Rumination  Thought Form:  Coherent  Logical     Insight:    Good     Plan:   Safety Plan: Recommended that patient call 911 or go to the local ED should there be a change in any of these risk factors.   Barriers to treatment: None identified  Patient Contracts (see media tab):  None  Substance Use: Not addressed in session   Continue or Discharge: Patient will continue in Adult Day Treatment (ADT)  as planned. Patient is likely to benefit from learning and using skills as they work toward the goals identified in their treatment plan.      Darling Echevarria.ZAY, LP  March 19, 2025

## 2025-03-19 NOTE — GROUP NOTE
Psychoeducation Group Note    PATIENT'S NAME: Fabiana Hu  MRN:   [id number]  :   1960  ACCT. NUMBER: [id number]  DATE OF SERVICE: 3/19/25  START TIME:  3:00 PM  END TIME:  3:50 PM  FACILITATOR: Mallory Sanchez LICSW; Chris Rowe OTR  TOPIC: MH Life Skills Group: Lifestyle Balance and Structure  [facility] Mental Health Outpatient Programs  TRACK: IOP/DT 3    NUMBER OF PARTICIPANTS: 7    Summary of Group / Topics Discussed:  Lifestyle Balance and Strucure:  Lifestyle Balance and Structure: Procrastination: Patients discussed the impact of procrastination on their daily function. Patients explored 15 different motivation strategies to improve engagement in daily life. Patients self-reflected on daily tasks they have been procrastinating and identified a motivation strategy they would like to try. Patients created a goal and the first step into decreasing procrastination and improving motivation in their daily life. Validation, support, and feedback were provided throughout group.      Patient Session Goals / Objectives:   Identify current patterns of procrastination behavior and how they influence thoughts and moods and inhibit motivation.   Identify behaviors that can be implemented that contribute to improving thoughts and feelings, motivation, and reduce symptoms.   Identify and develop a plan to increase activities that promote a sense of accomplishment and competence.   Practice scheduling positive activities / behaviors into daily routines.       Patient Participation / Response:  Fully participated with the group by sharing personal reflections / insights and openly received / provided feedback with other participants.    Verbalized understanding of content    Treatment Plan:  Patient has a current master individualized treatment plan.  See Epic treatment plan for more information.    WENDY Mcdermott

## 2025-03-19 NOTE — TELEPHONE ENCOUNTER
Left Voicemail (1st Attempt) and Sent Mychart (1st Attempt) for the patient to call back and schedule the following:    Appointment type: Neurotoxin  Provider: Dr. Edgar  Return date: 6/5/25 or later, add to the wait list  Specialty phone number: [phone]  Additional appointment(s) needed: NA  Additonal Notes:

## 2025-03-19 NOTE — GROUP NOTE
Psychotherapy Group Note    PATIENT'S NAME: Fabiana Hu  MRN:   [id number]  :   1960  ACCT. NUMBER: [id number]  DATE OF SERVICE: 3/19/25  START TIME:  2:00 PM  END TIME:  2:50 PM  FACILITATOR: Laura Franz LICSW  TOPIC: MH EBP Group: Behavioral Activation  [facility] Adult Mental Health Outpatient Programs  TRACK: IOP 55+     NUMBER OF PARTICIPANTS: 7    Summary of Group / Topics Discussed:  Behavioral Activation: Motivation and Procrastination: Patients explored how they currently spend their time, identifying thoughts and feelings that are motivating and serve to increase desired behaviors.  They also examined behaviors that contribute to procrastination.  Different types of procrastination behaviors were identified, and strategies to reduce individual procrastination and increase motivation were explored and practiced.  Patients identified ways to increase goal-directed activities to enhance mood and reduce symptoms.        Patient Session Goals / Objectives:  Identify current patterns of procrastination behavior and how they influence thoughts and moods, and inhibit motivation.  Identify behaviors that can be implemented that contribute to improving thoughts and feelings, motivation, and reduce symptoms.  Identify and develop a plan to increase activities that promote a sense of accomplishment and competence.  Practice scheduling positive activities / behaviors into daily routines.      Patient Participation / Response:  Fully participated with the group by sharing personal reflections / insights and openly received / provided feedback with other participants.    Demonstrated understanding of topics discussed through group discussion and participation, Expressed understanding of the relationship between behaviors, thoughts, and feelings, Shared experiences and challenges with making behavioral changes, Identified barriers to change, and Identified / Expressed personal readiness to make behavioral  change    Treatment Plan:  Patient has a current master individualized treatment plan.  See Epic treatment plan for more information.    KIRK BlakelySW

## 2025-03-20 NOTE — PROGRESS NOTES
Outpatient Mental Health Program Discharge Summary / Instructions - Adult         PATIENT'S NAME:    Fabiana Hu    MRN:                          [id number]  :                           1960     PROGRAM PARTICIPATION: 55+ Program (55+) Track: IOP/DT 3      ADMISSION DATE: 2025  DISCHARGE DATE: 3/24/2025        Reason for Discharge:   Completed treatment: factors leading to admission have been addressed to the extent that the patient can be safely transitioned to a less intensive level of care.      Diagnosis:   296.32 (F33.1) Major Depressive Disorder, Recurrent Episode, Moderate _ and With anxious distress  300.02 (F41.1) Generalized Anxiety Disorder.      Medications: (include name, dose, and frequency)  Per provider: chart review     Current Outpatient Medications   Medication Sig Dispense Refill    artificial saliva (BIOTENE MT) AERS spray Take 1 spray by mouth every 6 hours as needed for dry mouth. 10 mL 3    carbidopa-levodopa (SINEMET)  MG tablet 1/2 tab at 7:30am, 11:30am, 4:30pm and 7:30pm, 1 tab @10pm 270 tablet 3    doxepin (SINEQUAN) 10 MG capsule Take 10 mg by mouth at bedtime.      gabapentin (NEURONTIN) 300 MG capsule Take 600 mg by mouth at bedtime.      GEMTESA 75 MG TABS tablet TAKE 1 TABLET BY MOUTH EVERY DAY 90 tablet 1    lactobacillus rhamnosus, GG, (CULTURELL) capsule Take 1 capsule by mouth daily.      levothyroxine (SYNTHROID/LEVOTHROID) 75 MCG tablet Take 1 tablet by mouth daily      lithium (ESKALITH) 300 MG tablet Take 750 mg by mouth at bedtime.      QUEtiapine (SEROQUEL) 25 MG tablet Take 25 mg by mouth at bedtime. For sleep and anxiety      vitamin D3 (CHOLECALCIFEROL) 50 mcg (2000 units) tablet Take 1 tablet by mouth daily.       Current Facility-Administered Medications   Medication Dose Route Frequency Provider Last Rate Last Admin    botulinum toxin type B (MYOBLOC) injection 5,000 Units  5,000 Units Intramuscular Q12 weeks    2,750 Units at 25 1316     botulinum toxin type B (MYOBLOC) injection 5,000 Units  5,000 Units Intramuscular See Admin Instructions         botulinum toxin type B (MYOBLOC) Solution 2,500 Units  2,500 Units Intramuscular See Admin Instructions Jammie West MD   2,500 Units at 04/30/24 1646       DISCHARGE PLAN / RECOMMENDATIONS TO MANAGE SYMPTOMS AND PREVENT RELAPSE:     Take medications as prescribed.  Medication Management: Provider: Dr. Vega, [facility]   Next Appointment: Monday 3/24     Follow up with your providers and appointments.  Next Level of Care / Frequency:   Individual Therapist: Licha Malloy [facility]  Next appointment: Tuesday 3/25    Fabiana will start a community education cooking class    Fabiana will attend 1x month Parkinson support groups - one private and one run by Kaleb     Fabiana has started the orientation process to volunteer at [facility]    Patient was referred to the [facility] Stepdown [facility] in   [facility] (Tuesdays 2-4pm, [address], [place] [address], [place], [place], Accepts Medicare, Mixed Age Group, Hybrid (in person or online)  - Call [facility] Behavioral Intake to be placed on the waitlist and/or schedule [phone]         Consider follow up with the following resources and/or community supports:      Peer support programs  [place] [facility] Warmline Open Monday-Saturday, 12 PM to 10 PM  [phone]; Toll Free 855-WARMLINE or 602.942.7116 or text  Support  to 99835  Peer Support Connection [facility] Available from 5pm - 9am  (7 days a week/365 days a year) [phone]    Social Connection Resources    Meet Up: Find events and groups in your area at https://www.[employer].com/find/us--[place]--[place]/    Volunteer Match: Find volunteer opportunities in your area at www.volunteermatch.org   Community Education: reach out to your [facility]'s Community Education program for a variety of class offerings (e.g. art, culinary, music,  "health & wellness, language, computer/technology, etc.)  Community Centers / Senior Centers: visit www.Accessbio.com for a directory of senior centers and community centers in [place].  Includes recreational activities, fitness programs and more.    Support Groups    [facility] support groups: Listings for the free support groups are at https://namimn.org/support/[facility]-support-groups/ (524) 886-3889 free support groups for numerous group therapy topics & issues including: individual, family, couples, LGBTQ, Young Adults, parenting, anxiety, grief and loss, depression, and others    Community Drop In Centers (free activities and support)     Mental Health [facility] [place] [facility] [phone]; [address] [place] [address]  [facility] [address] [place] [address]; [phone]  [address] [address]; [phone]  [address] [address]; [phone]  [facility] [address] [place] [place] [address]; [phone]       Report symptoms and significant changes to your care team: including any safety concerns, thoughts of suicide or homicide, changes in sleep, increased confusion, mood getting worse, feeling more aggressive or increased isolation     Use coping skills:  Practice Mindfulness, Opposite to Emotion, Identify thought distortions & feelings, distractions, express self, keep appointments, take medications daily, journal, use gratitude, self-compassion, Wise Mind, Use of the 5 Senses, grounding, Talk to someone you trust at least one time weekly, set boundaries and say \"no\", be assertive, act opposite of negative feelings, accept challenges with a positive attitude, exercise at least three times per week for 30 minutes,  get enough sleep, eat healthy foods, get into a good routine     Do not use illicit drugs.     Avoid alcohol.       PERSONAL SAFETY / " CRISIS MANAGEMENT:  Follow your individualized Safety Coping Plan.  Report increased symptoms and safety concerns to your care team.  Call 911 or go to your nearest emergency department.  Use the crisis resources listed below:     Crisis Resources:  Suicide Prevention Lifeline: 3-984-871-YTRZ (8945)  Crisis Text Line Service (available 24 hours a day, 7 days a week): Text [place] to 259138  Call  **CRISIS (899241) from a cell phone to talk to a team of professionals who can help you.     Crisis Services By [facility]: Phone Number:   Maria E     [phone]   [place]    [phone]   [place] (COPE)    [phone]   [place]    [phone]   [place]                                                    [phone] (after hours)   [place]   [phone]   [place]     [phone]         The above discharge plan and recommendations were created to help you manage your mental health and to improve your overall functioning and well-being.      Not following the above recommendations may result in an increase of symptoms, potential safety risks and a need for increased services.     We appreciate the opportunity to work with you and sincerely thank you for choosing [facility].         Your treatment team: Brandt Campos MD; Darryl Campos DNP; WENDY Currie;  WENDY Candelario; Chris TOMPKINS/GIRISH; Althea Carroll RN          Patient Signature: ______________________________________________________ (signed copy uploaded to chart) Date:___________

## 2025-03-24 ENCOUNTER — HOSPITAL ENCOUNTER (OUTPATIENT)
Dept: BEHAVIORAL HEALTH | Facility: CLINIC | Age: 65
End: 2025-03-24
Attending: PSYCHIATRY & NEUROLOGY
Payer: MEDICARE

## 2025-03-24 DIAGNOSIS — F41.1 GENERALIZED ANXIETY DISORDER: Primary | ICD-10-CM

## 2025-03-24 PROCEDURE — 90853 GROUP PSYCHOTHERAPY: CPT

## 2025-03-24 PROCEDURE — 90853 GROUP PSYCHOTHERAPY: CPT | Performed by: SOCIAL WORKER

## 2025-03-24 ASSESSMENT — COLUMBIA-SUICIDE SEVERITY RATING SCALE - C-SSRS
2. HAVE YOU ACTUALLY HAD ANY THOUGHTS OF KILLING YOURSELF?: NO
1. SINCE LAST CONTACT, HAVE YOU WISHED YOU WERE DEAD OR WISHED YOU COULD GO TO SLEEP AND NOT WAKE UP?: NO

## 2025-03-24 NOTE — GROUP NOTE
Psychoeducation Group Note    PATIENT'S NAME: Fabiana Hu  MRN:   [id number]  :   1960  ACCT. NUMBER: [id number]  DATE OF SERVICE: 3/24/25  START TIME:  2:00 PM  END TIME:  2:50 PM  FACILITATOR: Darling Leiva LICSW  TOPIC: MH Life Skills Group: Lifestyle Balance and Structure  [facility] Adult Mental Health Outpatient Programs  TRACK: IOP/DT 3 55+    NUMBER OF PARTICIPANTS: 8    Summary of Group / Topics Discussed:  Lifestyle Balance and Strucure:  Goal-setting & integration: Patients were introduced to the process and benefits of setting realistic, timely, and achievable goals to help support their ability to follow through with meaningful personal, health, recovery and treatment goals that they would like to achieve to improve overall functioning.  Patients were also taught and then practiced techniques to manage a variety of obstacles to achieve their goals and how to break goals into manageable pieces.  Patients also reported on follow through of goals.     Patient Session Goals / Objectives:  Facilitated the creation of a vision for recovery and wellbeing  Identified and wrote meaningful SMART goals to engage in meaningful activities of daily living   Identified and problem solved barriers to achieving goals   Identified plan to support follow through on goals and reflection on progress made      Patient Participation / Response:  Fully participated with the group by sharing personal reflections / insights and openly received / provided feedback with other participants.    Verbalized understanding of content, Patient would benefit from additional opportunities to practice the content to be able to generalize it to their everyday life with increased intentionality, consistency, and efficacy in support of their psychiatric recovery, and Patient made progress towards meeting ITP goal number all    Treatment Plan:  Patient has See Epic Treatment Plan - Patient is discharging.    WENDY Currie

## 2025-03-24 NOTE — GROUP NOTE
Psychotherapy Group Note    PATIENT'S NAME: Fabiana Hu  MRN:   [id number]  :   1960  ACCT. NUMBER: [id number]  DATE OF SERVICE: 3/24/25  START TIME:  3:00 PM  END TIME:  3:50 PM  FACILITATOR: Mallory Sanchez LICSW; Althea Murcia RN  TOPIC: MH EBP Group: Symptom Awareness  [facility] Mental Health Outpatient Programs  TRACK: IOP/DT 3    NUMBER OF PARTICIPANTS: 8    Summary of Group / Topics Discussed:  Symptom Awareness: Mood Disorders: Patients received a general overview of mood disorders including depressive disorders,  and bipolar disorders and how it relates to their current symptoms. The purpose is to promote understanding of their diagnoses and how it impacts their functioning. Patients reviewed their current awareness of symptoms and diagnoses. Patients received information regarding diagnoses, etiology, cultural, and environmental factors as well as impact on functioning.     Patient Session Goals / Objectives:  Discussed patient individual symptoms and experiences  Reviewed diagnostic criteria and etiology of diagnoses       Patient Participation / Response:  Fully participated with the group by sharing personal reflections / insights and openly received / provided feedback with other participants.    Demonstrated understanding of topics discussed through group discussion and participation    Treatment Plan:  Patient has a current master individualized treatment plan.  See Epic treatment plan for more information.    WENDY Mcdermott

## 2025-03-24 NOTE — GROUP NOTE
"Process Group Note    PATIENT'S NAME: Fabiana Hu  MRN:   [id number]  :   1960  ACCT. NUMBER: [id number]  DATE OF SERVICE: 3/24/25  START TIME:  1:00 PM  END TIME:  1:50 PM  FACILITATOR: Darling Leiva LICSW  TOPIC:  Process Group    Diagnoses:  296.32 (F33.1) Major Depressive Disorder, Recurrent Episode, Moderate _ and With anxious distress  300.02 (F41.1) Generalized Anxiety Disorder.     [facility] Adult Mental Health Outpatient Programs  TRACK: IOP/DT 3 55+    NUMBER OF PARTICIPANTS: 8        Data:    Session content: At the start of this group, patients were invited to check in by identifying themselves, describing their current emotional status, and identifying issues to address in this group.   Area(s) of treatment focus addressed in this session included Symptom Management, Personal Safety, and Community Resources/Discharge Planning.  Fabiana attended her last day of IOP/DT 3 55+. Reported mood is \"relief, sadness\".   Client denied safety concerns, including SI and SIB. Client denies any chemical use.  Client is taking medications as prescribed. Client's goal is \"continue using the strategies\".  Client reported plans to use the following coping skills: \"stay in the moment, positive affirmations. Client talked about her discharge plans of continuing individual therapy, volunteering and taking a cooking class. Peers offered best wishes on her last day.          3/24/2025     3:00 PM   Suicide Ideation Check In   Since last session, how often have you had suicidal thoughts? No thoughts of suicide         Therapeutic Interventions/Treatment Strategies:  Psychotherapist offered support, feedback and validation and reinforced use of skills. Treatment modalities used include Cognitive Behavioral Therapy and Dialectical Behavioral Therapy. Interventions include Coping Skills: Discussed use of self-soothe skills to decrease distress in the body, Discussed how the use of intentional \"in the moment\" actions " "can help reduce distress, and Facilitated understanding of  what factors may contribute to symptom relapse and skills plan to manage symptom relapse .    Assessment:    Patient response:   Patient responded to session by accepting feedback, giving feedback, listening, focusing on goals, being attentive, and accepting support    Possible barriers to participation / learning include: and no barriers identified    Health Issues:   Yes: Chronic disease management, No Psychological Distress       Substance Use Review:   Substance Use: No active concerns identified.    Mental Status/Behavioral Observations  Appearance:   Appropriate   Eye Contact:   Good   Psychomotor Behavior: Normal   Attitude:   Cooperative  Interested Friendly Pleasant  Orientation:   All  Speech   Rate / Production: Normal    Volume:  Normal   Mood:    \"Relief, sadness\"  Affect:    Appropriate   Thought Content:   Safety denies any current safety concerns including suicidal ideation, self-harm, and homicidal ideation  Thought Form:  Coherent  Logical     Insight:    Good     Plan:   Safety Plan: No current safety concerns identified.  Recommended that patient call 911 or go to the local ED should there be a change in any of these risk factors.   Barriers to treatment: None identified  Patient Contracts (see media tab):  None  Substance Use: Not addressed in session   Continue or Discharge: Patient will continue in 55+ Program (55+) as planned. Patient is likely to benefit from learning and using skills as they work toward the goals identified in their treatment plan.      WENDY Currie  2025Process Group Note    PATIENT'S NAME: Fabiana Hu  MRN:   [id number]  :   1960  ACCT. NUMBER: [id number]  DATE OF SERVICE: 3/24/25  START TIME:  1:00 PM  END TIME:  1:50 PM  FACILITATOR: Darling Leiva LICSW  TOPIC:  Process Group    Diagnoses:  296.32 (F33.1) Major Depressive Disorder, Recurrent Episode, Moderate _ and With anxious " "distress  300.02 (F41.1) Generalized Anxiety Disorder.     [facility] Adult Mental Health Outpatient Programs  TRACK: IOP/DT 3 55+    NUMBER OF PARTICIPANTS: 8        Data:    Session content: At the start of this group, patients were invited to check in by identifying themselves, describing their current emotional status, and identifying issues to address in this group.   Area(s) of treatment focus addressed in this session included Symptom Management, Personal Safety, and Community Resources/Discharge Planning.  Reported mood is \"mellow\".  In regards to social anxiety she affirms she is \"somewhat better. So there is hope\".   Client denied safety concerns, including SI and SIB. Client denies any chemical use.  Client is taking medications as prescribed. Client's goal is reduce rumination when she first wakes up.  Client reported plans to use the following coping skills: mindfulness, distraction, check the facts.  Client is proud of starting her closet cleaning goal. Peers offered supportive feedback and validation.          3/24/2025     3:00 PM   Suicide Ideation Check In   Since last session, how often have you had suicidal thoughts? No thoughts of suicide         Therapeutic Interventions/Treatment Strategies:  Psychotherapist offered support, feedback and validation and reinforced use of skills. Treatment modalities used include Cognitive Behavioral Therapy and Dialectical Behavioral Therapy. Interventions include Coping Skills: Discussed use of self-soothe skills to decrease distress in the body, Assisted patient in identifying 1-2 healthy distraction skills to reduce overall distress, Discussed how the use of intentional \"in the moment\" actions can help reduce distress, and Promoted understanding of how and when to apply grounding strategies to reduce distress and increase presence in the moment and Emotions Management:  Increased awareness of daily mood patterns/changes.    Assessment:    Patient " "response:   Patient responded to session by accepting feedback, giving feedback, listening, focusing on goals, being attentive, and accepting support    Possible barriers to participation / learning include: and no barriers identified    Health Issues:   Yes: Chronic disease management, No Psychological Distress       Substance Use Review:   Substance Use: No active concerns identified.    Mental Status/Behavioral Observations  Appearance:   Appropriate   Eye Contact:   Good   Psychomotor Behavior: Normal   Attitude:   Cooperative  Interested Friendly Pleasant  Orientation:   All  Speech   Rate / Production: Normal    Volume:  Normal   Mood:    \"Mellow\"  Affect:    Appropriate   Thought Content:   Safety denies any current safety concerns including suicidal ideation, self-harm, and homicidal ideation  Thought Form:  Coherent  Logical     Insight:    Good     Plan:   Safety Plan: No current safety concerns identified.  Recommended that patient call 911 or go to the local ED should there be a change in any of these risk factors.   Barriers to treatment: None identified  Patient Contracts (see media tab):  None  Substance Use: Not addressed in session   Continue or Discharge: Patient is being discharged today. See Treatment Plan and Discharge Summary.       WENDY Currie  March 24, 2025  "

## 2025-03-25 ENCOUNTER — TELEPHONE (OUTPATIENT)
Dept: BEHAVIORAL HEALTH | Facility: CLINIC | Age: 65
End: 2025-03-25
Payer: MEDICARE

## 2025-03-25 PROBLEM — F41.1 GENERALIZED ANXIETY DISORDER: Status: RESOLVED | Noted: 2025-01-24 | Resolved: 2025-03-25

## 2025-03-25 NOTE — TELEPHONE ENCOUNTER
----- Message from Darling Leiva sent at 3/24/2025  4:43 PM CDT -----  Regarding: Pt. Discharging from ID[facility]  Patient Fabiana is discharging from 55+ IOP/DT 3 (M,W,Th,F 1-4pm) effective today 3/24.  Please remove any future appointments from the [facility].    All the best,    WENDY Currie

## 2025-03-26 DIAGNOSIS — R39.9 LOWER URINARY TRACT SYMPTOMS: ICD-10-CM

## 2025-03-27 RX ORDER — VIBEGRON 75 MG/1
75 TABLET, FILM COATED ORAL DAILY
Qty: 90 TABLET | Refills: 1 | Status: SHIPPED | OUTPATIENT
Start: 2025-03-27

## 2025-03-27 ASSESSMENT — ANXIETY QUESTIONNAIRES
2. NOT BEING ABLE TO STOP OR CONTROL WORRYING: SEVERAL DAYS
5. BEING SO RESTLESS THAT IT IS HARD TO SIT STILL: NOT AT ALL
GAD7 TOTAL SCORE: 5
GAD7 TOTAL SCORE: 5
1. FEELING NERVOUS, ANXIOUS, OR ON EDGE: SEVERAL DAYS
3. WORRYING TOO MUCH ABOUT DIFFERENT THINGS: SEVERAL DAYS
6. BECOMING EASILY ANNOYED OR IRRITABLE: NOT AT ALL
IF YOU CHECKED OFF ANY PROBLEMS ON THIS QUESTIONNAIRE, HOW DIFFICULT HAVE THESE PROBLEMS MADE IT FOR YOU TO DO YOUR WORK, TAKE CARE OF THINGS AT HOME, OR GET ALONG WITH OTHER PEOPLE: SOMEWHAT DIFFICULT
7. FEELING AFRAID AS IF SOMETHING AWFUL MIGHT HAPPEN: SEVERAL DAYS

## 2025-03-27 ASSESSMENT — PATIENT HEALTH QUESTIONNAIRE - PHQ9
SUM OF ALL RESPONSES TO PHQ QUESTIONS 1-9: 6
5. POOR APPETITE OR OVEREATING: SEVERAL DAYS

## 2025-03-27 NOTE — TELEPHONE ENCOUNTER
RX Authorization    Medication: GEMTESA 75 MG TABS tablet     Date last refill ordered: 9/29/24    Quantity ordered: 90    # refills: 1    Date of last clinic visit with ordering provider: 1/24/25    Date of next clinic visit with ordering provider: 4/22/25

## 2025-03-31 ENCOUNTER — TELEPHONE (OUTPATIENT)
Dept: OCCUPATIONAL THERAPY | Facility: REHABILITATION | Age: 65
End: 2025-03-31
Payer: MEDICARE

## 2025-04-14 PROBLEM — A04.8 HELICOBACTER PYLORI INFECTION: Status: ACTIVE | Noted: 2024-09-10

## 2025-04-14 PROBLEM — B96.20 E. COLI UTI: Status: ACTIVE | Noted: 2021-11-05

## 2025-04-14 PROBLEM — N39.0 E. COLI UTI: Status: ACTIVE | Noted: 2021-11-05

## 2025-04-14 RX ORDER — BUPROPION HYDROCHLORIDE 100 MG/1
100 TABLET, EXTENDED RELEASE ORAL DAILY
COMMUNITY
Start: 2025-03-24 | End: 2026-03-24

## 2025-04-14 RX ORDER — GABAPENTIN 100 MG/1
400 CAPSULE ORAL AT BEDTIME
COMMUNITY
Start: 2025-01-21 | End: 2025-04-21

## 2025-04-14 RX ORDER — DOXEPIN 6 MG/1
3 TABLET, FILM COATED ORAL AT BEDTIME
COMMUNITY
Start: 2024-11-19 | End: 2025-04-21

## 2025-04-14 RX ORDER — GABAPENTIN 400 MG/1
400 CAPSULE ORAL AT BEDTIME
COMMUNITY
Start: 2024-12-13 | End: 2025-04-21

## 2025-04-14 RX ORDER — LITHIUM CARBONATE 450 MG
450 TABLET, EXTENDED RELEASE ORAL DAILY
COMMUNITY
Start: 2025-02-10

## 2025-04-17 ENCOUNTER — TELEPHONE (OUTPATIENT)
Dept: UROLOGY | Facility: CLINIC | Age: 65
End: 2025-04-17
Payer: MEDICARE

## 2025-04-17 NOTE — TELEPHONE ENCOUNTER
[facility] Call Center    Phone Message    May a detailed message be left on voicemail: yes     Reason for Call: Other: Fabiana calling again as she states she was advised Leslie would call her back and its been several days. Patient is questioning where she is able to get the Oxybutynin OTC.       Action Taken: Other: [facility]    Travel Screening: Not Applicable     Date of Service:

## 2025-04-17 NOTE — TELEPHONE ENCOUNTER
I spoke with Fabiana earlier this week and told her to look at ordering the patches from [email or web address] or [employer]. I called to Fabiana again today. She requested a call back from Leslie regarding OTC oxybutynin patches--Fabiana was not able to find a drugstore that carries these.   I reviewed Fabiana's last visit note with Dr. Díaz Sept '24 that recommended Fabiana follow up on sx in clinic and if continuing to have urinary sx, would consider cysto and UDS. Also noted that pt found Gemtesa and Trospium to be ineffective in the past.     Can you help pt coordinate an earlier follow up with Dr. Díaz than 7/8/25?

## 2025-04-22 ENCOUNTER — TELEPHONE (OUTPATIENT)
Dept: PSYCHOLOGY | Facility: CLINIC | Age: 65
End: 2025-04-22

## 2025-04-22 ENCOUNTER — TELEPHONE (OUTPATIENT)
Dept: NEUROLOGY | Facility: CLINIC | Age: 65
End: 2025-04-22

## 2025-04-22 ENCOUNTER — OFFICE VISIT (OUTPATIENT)
Dept: NEUROLOGY | Facility: CLINIC | Age: 65
End: 2025-04-22
Payer: MEDICARE

## 2025-04-22 VITALS — SYSTOLIC BLOOD PRESSURE: 126 MMHG | HEART RATE: 62 BPM | OXYGEN SATURATION: 98 % | DIASTOLIC BLOOD PRESSURE: 69 MMHG

## 2025-04-22 DIAGNOSIS — C43.9 MELANOMA OF SKIN (H): ICD-10-CM

## 2025-04-22 DIAGNOSIS — G20.B1 PARKINSON'S DISEASE WITH DYSKINESIA WITHOUT FLUCTUATING MANIFESTATIONS (H): Primary | ICD-10-CM

## 2025-04-22 DIAGNOSIS — K11.7 DROOLING: ICD-10-CM

## 2025-04-22 RX ORDER — CARBIDOPA AND LEVODOPA 25; 100 MG/1; MG/1
TABLET ORAL
Qty: 360 TABLET | Refills: 3 | Status: SHIPPED | OUTPATIENT
Start: 2025-04-22

## 2025-04-22 RX ORDER — GLYCOPYRROLATE 1 MG/1
TABLET ORAL
Qty: 60 TABLET | Refills: 11 | Status: SHIPPED | OUTPATIENT
Start: 2025-04-22

## 2025-04-22 NOTE — TELEPHONE ENCOUNTER
Prior Authorization Retail Medication Request    Medication/Dose: Glycopyrrolate  Diagnosis and ICD code (if different than what is on RX):  Drooling [K11.7]   New/renewal/insurance change PA/secondary ins. PA:  Previously Tried and Failed:    Rationale:      Insurance   Primary:   Insurance ID:      Secondary (if applicable):  Insurance ID:      Pharmacy Information (if different than what is on RX)  Name:  [facility] #[id number] [place] [address] [address] AT [facility]   Phone:  [phone]   Fax:[phone]     Clinic Information  Preferred routing pool for dept communication: [id number]

## 2025-04-22 NOTE — TELEPHONE ENCOUNTER
Health Psychology                    Department of Medicine  Joana Rodriguez, Ph.D., L.P. [phone]  [facility] Marily Gonzalez, Ph.D., L.P. [phone]   [place] [address] Tacos Calderon, Ph.D., L.P.  [phone]  [address] [place] [address] Jam Locke, Ph.D. [phone]  [place] [address]  Rowdy Jaimes, Ph.D., L.P. [phone]              Christy Peñaloza, Ph.D., L.P. [phone]  [facility]   Arely Landaverde, Ph.D., A.B.P.P., L.P. [phone]     [address] [place] [address]                      Telephone Note    Patient called today requesting information about the referral.  She is scheduled for an intake appointment in September. I called her back.  She is already receiving some mental health services, but when I explained it was to help her coping with PD, she was interested in keeping the appointment.    Rowdy Jaimes, Ph.D., L.P.  Director, Health Psychology  [phone]

## 2025-04-22 NOTE — LETTER
2025       RE: Fabiana Hu   W Isac Schneider  [facility] [address]     Dear Colleague,    Thank you for referring your patient, Fabiana Hu, to the [facility] [place] at [facility]. Please see a copy of my visit note below.        Diagnosis/Summary/Recommendations:    PATIENT: Fabiana Hu  65 year old female     : 1960    DEN: 2025       MRN: [id number]   W ISAC SCHNEIDER   [facility] [address]     [phone] ()   [phone] ()      [email or web address]     Brandt hu spouse  [phone]     Referral from PCP   # possible Parkinson's; followed by neurology  She is currently at 1/2 tab daily and will follow-up with neurology.      Seen at [facility]     Assessment:  (G20) Parkinsonism, unspecified Parkinsonism type (H)  (primary encounter diagnosis)  No family history of parkinson      3/31/2023 dopamine scan (datscan) - not sure what medication was taking   Decreased radiotracer uptake in the caudate nuclei/putamina suspicious for a dopaminergic degenerative process such as Parkinson's disease.      Review of diagnosis    Parkinsonism  Diagnosed 2022  Presumed left side onset and has left arm now swinging      Avoidance of dopamine blockers   Not taking     Motor complication review   Has involuntary leg movements - that have begun since been on sinemet     Review of Impulse control disorders   Denies      Review of surgical or medication options   reviewed     Gait/Balance/Falls   Fell out bed  No falls otherwise     Exercise/Therapy performed/offered   Physical therapy for shoulder   Did big therapy at [facility] mirta   Did some speech therapy at [facility]      Cognitive/Driving   [place]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 -   No changes in cognition  Nervous about driving      Mood   Depression - been getting worse lately; has informed her psychiatrist; working to  "increase Lithium  Anxiety  Diagnosed in [place] when was living there for 3  Years 1998  Diagnosed 1999  Has incident of it then in 1999 - not hospitalized   Had incident of it 2010 - not hospitalized  Never suicidal   Tried a lot of antidepressant  Been on lithium for a long time   Brother Scar has depression and done well with lithium  Daughter diagnosed with bipolar 2014 and is on lithium and doing okay - getting  this fall   Mood is stable  Sister is a nurse in [employer] [facility] [employer] dheerajny      Spouse is retired and did software development  He now plays the [employer]     Psychiatry - [facility] - [facility] - [facility]/SLP  Therapist - Marion     Hallucinations/delusions   No      Sleep   Sleep is \"horrible\"  Fall asleep okay but has trouble staying asleep. Worsened after starting Tropsium but can't say for certain is waking up due to dry mouth or something else  Has been working on sleep hygiene, using the Calm Patel  Goes to bed around 1030pm   Wake up 1-2x per night  Talking in her sleep  No longer on Melatonin. Didn't feel it was helpful  Not clear how long it has helped but it may have helped.   Sleep study was done at [facility] and diagnosed with  RBD  Had mild sleep apnea and tried cpap 5 years and not using this.   Has some creepy crawling feeling in bed when going to bed  Has some movement of legs to relieve symptoms  Not sure if there is PLMS - no leg jerking  Using Gabapentin for restlessness and dream enactment behavior. Dream enactment is much decreased and nightmares are better     Bladder/Renal/Prostate/Gyn/Other  E coli uti  Urinary incontinence sees specialist  Wearing a pad - it has been really bad   Urinary urgency  No significant dribbling or stress urinary incontinence.   Urinary frequency at night  Small volumes  Empty out.   Has not been on any treatment.   Was given sample of Gemtasa, but is cost-prohibitive and didn't work well. Tried Tropsium which is beneficial, but is " having dry mouth  Has drinking a lot of water     GI/Constipation/GERD   Adenomas  Constipation has improved due to eating brown bread  Esophageal dysmotility and has not had motility study or an EGD  Has not had medications for this   h pylori diagnosed by Gi person and given antibiotic and tested afterwards  Using tums as needed   Has belching problem with tablets. Now Pepto-Bismol     ENDO/Lipid/DM/Bone density/Thyroid  Hypothyroidism  Denies cholesterol or diabetes     Cardio/heart/Hyper or Hypotensive   AVM in right leg   Varicose veins  No blood pressure issue   No fainting or passing     Vision/Dry Eyes/Cataracts/Glaucoma/Macular   Wears glasses  Had cataract surgery bilateral      Heme/Anticoagulation/Antiplatelet/Anemia/Other  Not taking aspirin     ENT/Resp  Seasonal allergies  Smell loss for a long time  Drooling in the past  Has not had covid     Skin/Cancer/Seborrhea/other  Atypical nevus  Psoriasis   Lesions removed but not clear if cancer     Musculoskeletal/Pain/Headache  Has back pain and going for physical therapy. Has chronic low back pain with non-radiating symptoms that has gotten worse in the last 6mo. She can't recall an inciting incident for this. She will use a hot pad and lie down for this, which helps eventually     Has done big therapy and developed shoulder pain - left shoulder  Having right shoulder pain and going for physical therapy  Has some back pain     Other:  Fibroids  Breast lumps/mass  Had biopsy 2012   Broke a tooth     Pharmacy ([place]) consultation and medication management     Denys Stahl     Gemtesa was expensive and did not try  Has not been tried on mirabegron (myrbetriq) 25mg daily     Genetic testing  [email or web address]  PDgeneration -   invitae      1. Would start with improving the neurogenic bladder issues - trial of mirabegron or other options     2. Sinemet dose in evening or during the night - using the short acting 1/2 tablet or trying long acting    "  3. Clean up timing of night time medication     4. Ongoing esophageal dysmotility/swallowing problems. - EGD or motility     5. Early discussion about deep brain stimulation (DBS) because of her dyskinesias in her legs that are occurring early in the course of disease.      6. Trial of amantadine and long acting amantadine (Gocovri)     7. Neuropsychological baseline evaluation     8. Pharmacy consultation as noted above.      9. Genetics consultation with Lee Lewis      10. Return to see Radha Keene NP to get baseline motor evaluation.         Parkinson's Disease:  Patient has a 1 year history of PD.  Tremors, gait, and bed mobility have improved with Sinemet. However, she is experiencing dyskinesias in Lt body that is bothersome.   MDS UPDRS PART II    8/24/2023  2:02 PM   UPDRS EDL Scale     2.1 Speech 0    2.2 Salivation 4    2.3 Chew & Swallow 1    2.4 Eating  0    2.5 Dressing  0    2.6 Hygiene  0    2.7 Handwriting  0    2.8 Hobbies etc.  0    2.9 Turn in bed  0    2.10 Tremor  1    2.11 Stand from chair  0    2.12 Walking & Balance  3    2.13 Freezing  0    MDS-UPDRS II Total Score 9            Sialorrhea: Bothersome. Using sugarless candy.         __  The following patient instructions provided: -      __  You can take Sinemet with juice or carbonated beverages.      __  After your daughter's wedding, consider going down on the Sinemet.     ___ Week 1: Try reducing the 4:30 pm dose.     PD Medications 7:30 am 11:30 am 4:30 am 7:30 pm   Sinemet 25/100 mg  1 1 1/2 1/2      __  Week 2:  If  you still have bothersome dyskinesias, try reducing either the 7:30 or 11:30 am dose from 1 tab to 1/2 tab.     __  You can keep going down to 1/2 tab 4 times a day.  If tremors, slowness, & gait difficulty become worse, let us know.     __  Check your Melatonin bottle for \"USP\" symbol.      Yue Mahajan, Pharm.D. recommends for OTC supplements to \"USP\" symbol. USP stands for the \"United [place] Pharmacopoeia.\" USP " "approval means you can be assured of purity, potency, stability and disintegration. Essentially it ensures that the product contains the ingredients listed on the label and that it has been made according to FDA Good Manufacturing Practices.     The two brands that always have \"USP\" are \"Nature Made\" and \"Ford Signature\" from [employer].      __  Here is the article on the Co Q 10 Enzyme trial.      https://jamanetwork.com/journals/jamaneurology/fullarticle/0176864#:~:text=In%20summary%2C%20although%20the%20QE3,the%20treatment%20of%20early%20PD.     __  Referral to Neurology for evaluation and possible Botox injection.  You can see Dr. West.      __  I'll have one of the nurses call you regarding the  Class.      __ Return in 3 months to see Dr. Weston. You may return sooner as needed.      Cognitive evaluation 9/12/2023 Dr. Fernandez     Fabiana Hu is a 63 year old woman with a history of Parkinson's disease diagnosed about a year ago.  Her most bothersome symptoms now are dyskinesias.  This neuropsychological evaluation was undertaken to establish a neurocognitive baseline.     Results indicate performance that falls almost entirely within normal limits across cognitive domains, generally in the average to above average range. There is subtle executive dysfunction manifested in difficulty shifting cognitive set. Memory, language, attention, and visual processing all fall well within normal limits. She is not reporting significant depressive symptomatology or apathy.     This pattern of performance is suggestive of at most, subtle frontal system involvement. The findings do not reflect dementia at this time, nor are they suggestive of Mild Cognitive Impairment. While similar patterns may be observed with Parkinson's disease, it is also possible that nonrestorative sleep, and potentially medications, underlie her subtle cognitive inefficiencies.      Botox injection 10/24/2023 ???     Annie is " better  Drinking water with orange juice to get medications down     No travel plans.      Dry eyes  Over the counter product  Pharmacy (MTM) consultation and medication management  Please call the scheduling number I@ [phone] to set up an appointment with pharmacists Yue Mahajan or Yancy Wright.      Urinary urgency/frequency  Trial of 50mg of mirabegron  Physical therapy referral  Urology referral -      Denies constipation presently  Had a colonoscopy which was a disaster and had to do a second clean out  Had 3 polyps removed and will need to go back in 3 years.      [facility]  [email or web address]  Therapist Marion - on line   medication manager   Silvia Faulkner  DNP, APRN, CNP, PMHNP-BC  Psychiatric Nurse Practitioner     Hopefully lithium will be reduced  Is on gabapentin neurontin 400mg at night  Using wellbutrin xl 150mg in the morning     Melatonin 10mg at night.     Dyskinesias are better with lower dose of sinemet  No freezing  No falls  Exercising - has a fitness class for parkinson patients   [employer] class  Walking weekly with friends.   Virtual class may join     Botox was done and not clear it was that helpful but will try a bigger dose.        AREA/MUSCLE INJECTED:    Muscles Injected Units Injected Number of Injections   Right Parotid 1250 (1000) 1   Left Parotid 1250 (1000) 1                   Total Units Injected: 2500     Unavoidable Waste: 0     Total Units Billed 2500     ( ) = previous dose    - Continue on your current Carbidopa/Levodopa dose  - Try pushing your Gabapentin later in the day. Taking it around 10pm rather than 8pm in hopes of helping with sleep. If this doesn't solve the problem, can increase to taking Gabapentin 800mg (1 tablet 400mg + 2 tablets 100mg) at night  - Consider using Biotene spray or mouth tape for dry mouth at night  - Continue to work with your psychiatrist and therapist for ongoing  management of your anxiety/depression. We'll continue to monitor you on this recent increase in Lithium  - Continue to exercise and stretch as much as you can. Hopefully this will help with your back.  - PT referral for musculoskeletal back pain + parkinson's  - OT referral  - Follow-up in 4mo with Radha Jassi       Medications       730 8/830 1130 430p 730p 8p 9:30 10p   Artificial saliva biotene MT            Botulinum toxin type B myobloc   trial                 Bupropion wellbutrin XL 150mg 24hr     1               Calcium Vit D Vit K                     Carbidopa/levodopa Sinemet 25/100   1/2   1/2 1/2 1/2 1    Doxepin sinequan 10mg         1   Gabapentin neurontin 300mg        1    Gemtesa  ubegron 75mg   1         Ibuprofen advil 200mg   1         Lactobacillus probiotic 1           Levothyroxine synthroid levothyroid 75mcg 1                   Lithium 450mg CR            Lithium eskalith 300mg              1       Melatonin 10mg extended release (10mg B6)             off       Mirabegron myrbetriq 50mg   off                 Pepto-Bismol            Quetiapine seroquel 25mg         1   Trospium sancturia XR 60mg 24hr    off                 Virabegron gemtesa 75mg  above          Vit D3 cholecalciferol 50 2000                                                                                                               Plan:    3am wakes up and is off   Consider options  1/2 of sinemet with water (carbonated)  Consider dhivy   Consider rotigotine patch  Consider requip xl  Consider vyalev pump  Discussed deep brain stimulation    She has shuffling and did PT/OT  She does nolberto on Monday in person and virtual on wed and Fridays which she does some times.     She is taking seroquel and doxepin and another product which was expensive and so is  not on this.   Her actigraphy is in the media section for 4/14/2025  She will not increase the doxepin and remain at 10mg    Wondering about  using apple sauce instead  of water with pills.     Discussed cognitive testing - had a baseline in 2023.     She tries to stay on a time schedule    Has problems cutting pills as some are not scored.     Has had 3 surgeries for melanoma    Loss of smell    Has drooling - tried botox and stopped it  And did not have benefit on both sides this time.   Has a wedding in [place] and wants her drooling fixed  Has a dry mouth at night  And has drooling during the day  Has not tried atropine or robinul  She is waiting for her next botox injection which is in 3 months    She is using gemtesa and not that effective but is still 300 dollars copay  Trospium worked but had dry mouth  Was considering patch  Has urology followup   Oxytrol ? 3.9mg 2/week - cannot find it.   Bladder is a big issue.   For now remains on gemtesa    Was seen by Radha on 1/24/2025    Her mental health is better    Pharmacy (MTM) consultation and medication management  Please call the scheduling number @ [phone] to set up an appointment with pharmacists Yue Mahajan, Yancy Wright, or Gregoria Palmer.     Return to see radha in 3 months.     Future Appointments 4/22/2025 - 10/19/2025        Date Visit Type Length Department Provider     7/8/2025 10:15 AM RETURN PATIENT 15 min [facility] Sandhya Padilla MD    Location Instructions:     Clinic is located at [address] [place] [address], [facility] [address], [facility] [id number]              8/4/2025  1:00 PM ADULT PSYCHIATRY NEW 60 min [facility] Jennifer Puntam, SHLOMO CNP              8/15/2025  1:00 PM NEW SLEEP 60 min [facility] Brandt Marte, PsyD    Location Instructions:     Enter through the main entrance at the front of the building.&nbsp; Proceed to the 2nd floor, using either the stairs or the elevator, and check in at the reception desk in the 2nd floor lobby.              9/18/2025  9:00 AM NEUROTOXIN 30 min [facility] Nancy Edgar DO    Location Instructions:     Due to road  [place] [address], travel times to this location may be longer than usual. Please plan for extra travel time and check the [facility] [address] project website for delay, closure, and detour information.  The [facility] [place] in a dense urban area with multiple transportation and parking options. You may wish to review options for  service and self-parking in more detail on the [facility] website at [email or web address] [place].              9/22/2025  1:00 PM ADULT PSYCHOTHERAPY NEW [place] [facility] Rowdy Jaimes, PhD     Location Instructions:     Due to road [place] [address], travel times to this location may be longer than usual. Please plan for extra travel time and check the [facility] [address] [place] website for delay, closure, and detour information.  The [facility] [place] in a dense urban area with multiple transportation and parking options. You may wish to review options for  service and self-parking in more detail on the [facility] website at [email or web address].                       Coding statement:   Medical Decision Making:  #  Chronic progressive medical conditions addressed  - see above --   Review and/or interpretation of unique test or documentation from a provider outside of neurology no   Independent historian provided additional details  yes I  Prescription drug management and review of potential side effects and/or monitoring for side effects  -- see above ---  Health impacted by social determinants of health  no    I have reviewed the note as documented above.  This accurately captures the substance of my conversation with the patient and total time spent preparing for visit, executing visit and completing visit on the day of the visit:  30 minutes.  The portion of this total time included face to face time     The longitudinal plan of care for Fabiana Hu was addressed during  this visit. Due to the added complexity in care, I will continue to support Fabiana Hu in the subsequent management of this condition(s) and with the ongoing continuity of care of this condition(s).      Ramon Weston MD     ______________________________________    Last visit date and details:             ______________________________________      Patient was asked about 14 Review of systems including changes in vision (dry eyes, double vision), hearing, heart, lungs, musculoskeletal, depression, anxiety, snoring, RBD, insomnia, urinary frequency, urinary urgency, constipation, swallowing problems, hematological, ID, allergies, skin problems: seborrhea, endocrinological: thyroid, diabetes, cholesterol; balance, weight changes, and other neurological problems and these were not significant at this time except for   Patient Active Problem List   Diagnosis     Arteriovenous malformation (AVM)     Atypical nevus     Colon adenomas     Hypothyroidism     Fibroids     Major depressive disorder, recurrent episode     Parkinson's disease (H)     Psoriasis     Seasonal allergies     Severe episode of recurrent major depressive disorder, with psychotic features (H)     Varicose veins of other specified sites     Urinary incontinence     Adenomatous polyp of colon     Seasonal allergic rhinitis     Major depressive disorder     Dyskinesia due to Parkinson's disease (H)     Sialorrhea     Lower urinary tract symptoms (LUTS)     Pelvic floor weakness in female     Benign neoplasm of ascending colon     Benign neoplasm of transverse colon     Family history of colon cancer     FH: stomach cancer     H. pylori infection     History of colonic polyps     Polyp of colon     Chest pain, unspecified     E. coli UTI     Helicobacter pylori infection     Lump or mass in breast        No Known Allergies  Past Surgical History:   Procedure Laterality Date     breast surgery  2012    central duct excision right breast      SECTION       x2     EYE SURGERY Bilateral     cataract surgery     ORAL SURGERY IP CONSULT Left     oral mucosal lesion left     Past Medical History:   Diagnosis Date     Parkinson's disease (H)      Urinary incontinence 05/07/2023    urge incontinence     Social History     Socioeconomic History     Marital status:      Spouse name: Not on file     Number of children: Not on file     Years of education: Not on file     Highest education level: Not on file   Occupational History     Not on file   Tobacco Use     Smoking status: Never     Smokeless tobacco: Never   Substance and Sexual Activity     Alcohol use: Yes     Drug use: Not on file     Sexual activity: Not on file   Other Topics Concern     Not on file   Social History Narrative     Not on file     Social Drivers of Health     Financial Resource Strain: Low Risk  (7/27/2022)    Received from [facility], [facility]    Financial Resource Strain      Difficulty of Paying Living Expenses: 3      Difficulty of Paying Living Expenses: Not on file   Food Insecurity: No Food Insecurity (7/27/2022)    Received from [facility], [facility]    Food Insecurity      Worried About Running Out of Food in the Last Year: 1   Transportation Needs: No Transportation Needs (7/27/2022)    Received from [facility] [employer] [facility], [facility]    Transportation Needs      Lack of Transportation (Medical): 1   Physical Activity: Not on file   Stress: Not on file   Social Connections: Unknown (8/1/2023)    Received from [facility] [employer] [facility], [facility]    Social Connections      Frequency of Communication with Friends and Family: Not on file   Interpersonal Safety: Not on file   Housing Stability: Low Risk  (7/27/2022)    Received from [facility]  [facility], [facility] & [facility]    Housing Stability      Unable to Pay for Housing in the Last Year: 1       Drug and lactation database from the [place] National Library of Medicine:  http://toxnet.nlm.nih.gov/cgi-bin/sis/htmlgen?LACT      B/P: Data Unavailable, T: Data Unavailable, P: Data Unavailable, R: Data Unavailable 0 lbs 0 oz  There were no vitals taken for this visit., There is no height or weight on file to calculate BMI.  Medications and Vitals not listed above were documented in the cart and reviewed by me.     Current Outpatient Medications   Medication Sig Dispense Refill     buPROPion (WELLBUTRIN SR) 100 MG 12 hr tablet Take 100 mg by mouth daily.       doxepin (SILENOR) 6 MG tablet Take 3 mg by mouth at bedtime.       gabapentin (NEURONTIN) 100 MG capsule Take 400 mg by mouth at bedtime.       gabapentin (NEURONTIN) 400 MG capsule Take 400 mg by mouth at bedtime.       lithium (ESKALITH CR/LITHOBID) 450 MG CR tablet Take 450 mg by mouth 2 times daily.       artificial saliva (BIOTENE MT) AERS spray Take 1 spray by mouth every 6 hours as needed for dry mouth. 10 mL 3     carbidopa-levodopa (SINEMET)  MG tablet 1/2 tab at 7:30am, 11:30am, 4:30pm and 7:30pm, 1 tab @10pm 270 tablet 3     doxepin (SINEQUAN) 10 MG capsule Take 10 mg by mouth at bedtime.       gabapentin (NEURONTIN) 300 MG capsule Take 600 mg by mouth at bedtime.       GEMTESA 75 MG TABS tablet TAKE 1 TABLET BY MOUTH EVERY DAY 90 tablet 1     lactobacillus rhamnosus, GG, (CULTURELL) capsule Take 1 capsule by mouth daily.       levothyroxine (SYNTHROID/LEVOTHROID) 75 MCG tablet Take 1 tablet by mouth daily       lithium (ESKALITH) 300 MG tablet Take 750 mg by mouth at bedtime.       QUEtiapine (SEROQUEL) 25 MG tablet Take 25 mg by mouth at bedtime. For sleep and anxiety       vitamin D3 (CHOLECALCIFEROL) 50 mcg (2000 units) tablet Take 1 tablet by mouth daily.           Ramon  Enrico VIDES      Again, thank you for allowing me to participate in the care of your patient.      Sincerely,    Ramon Weston MD

## 2025-04-22 NOTE — PATIENT INSTRUCTIONS
Medications       730 8/830 1130 430p 730p 8p 9:30 10p   Artificial saliva biotene MT            Botulinum toxin type B myobloc   trial                 Bupropion wellbutrin XL 150mg 24hr     1               Calcium Vit D Vit K                     Carbidopa/levodopa Sinemet 25/100   1/2   1/2 1/2    1/2 1    Doxepin sinequan 10mg         1   Gabapentin neurontin 300mg        1    Gemtesa  ubegron 75mg   1         Ibuprofen advil 200mg   1         Lactobacillus probiotic 1           Levothyroxine synthroid levothyroid 75mcg 1                   Lithium 450mg CR            Lithium eskalith 300mg              1       Melatonin 10mg extended release (10mg B6)             off       Mirabegron myrbetriq 50mg   off                 Pepto-Bismol            Quetiapine seroquel 25mg         1   Trospium sancturia XR 60mg 24hr    off                 Virabegron gemtesa 75mg  above          Vit D3 cholecalciferol 50 2000                                                                                                               Plan:    3am wakes up and is off   Consider options  1/2 of sinemet with water (carbonated)  Consider dhivy   Consider rotigotine patch  Consider requip xl  Consider vyalev pump  Discussed deep brain stimulation    She has shuffling and did PT/OT  She does nolberto on Monday in person and virtual on wed and Fridays which she does some times.     She is taking seroquel and doxepin and another product which was expensive and so is  not on this.   Her actigraphy is in the media section for 4/14/2025  She will not increase the doxepin and remain at 10mg    Wondering about  using apple sauce instead of water with pills.     Discussed cognitive testing - had a baseline in 2023.     She tries to stay on a time schedule    Has problems cutting pills as some are not scored.     Has had 3 surgeries for melanoma    Loss of smell    Has drooling - tried botox and stopped it  And did not have benefit on both sides  this time.   Has a wedding in [place] and wants her drooling fixed  Has a dry mouth at night  And has drooling during the day  Has not tried atropine or robinul  She is waiting for her next botox injection which is in 3 months    She is using gemtesa and not that effective but is still 300 dollars copay  Trospium worked but had dry mouth  Was considering patch  Has urology followup   Oxytrol ? 3.9mg 2/week - cannot find it.   Bladder is a big issue.   For now remains on gemtesa    Was seen by Radha on 1/24/2025    Her mental health is better    Pharmacy (MTM) consultation and medication management  Please call the scheduling number @ [phone] to set up an appointment with pharmacists Yue Mahajan, Yancy Wright, or Gregoria Palmer.     Return to see radha in 3 months.

## 2025-04-24 ENCOUNTER — VIRTUAL VISIT (OUTPATIENT)
Dept: PHARMACY | Facility: CLINIC | Age: 65
End: 2025-04-24
Attending: PSYCHIATRY & NEUROLOGY
Payer: MEDICARE

## 2025-04-24 DIAGNOSIS — G47.00 INSOMNIA, UNSPECIFIED TYPE: ICD-10-CM

## 2025-04-24 DIAGNOSIS — F41.1 GAD (GENERALIZED ANXIETY DISORDER): ICD-10-CM

## 2025-04-24 DIAGNOSIS — G20.C PARKINSONISM, UNSPECIFIED PARKINSONISM TYPE (H): Primary | ICD-10-CM

## 2025-04-24 DIAGNOSIS — F33.1 MODERATE EPISODE OF RECURRENT MAJOR DEPRESSIVE DISORDER (H): ICD-10-CM

## 2025-04-24 RX ORDER — LITHIUM CARBONATE 300 MG/1
300 TABLET, FILM COATED, EXTENDED RELEASE ORAL DAILY
COMMUNITY

## 2025-04-24 RX ORDER — CARBIDOPA AND LEVODOPA 50; 200 MG/1; MG/1
1 TABLET, EXTENDED RELEASE ORAL AT BEDTIME
Qty: 30 TABLET | Refills: 2 | Status: SHIPPED | OUTPATIENT
Start: 2025-04-24

## 2025-04-24 NOTE — PROGRESS NOTES
Medication Therapy Management (MTM) Encounter    ASSESSMENT:                            Medication Adherence/Access: No issues identified.    Parkinsonism:   Doing generally well throughout the day. Her last two doses of the day are relatively close together and unclear whether necessary. Discussed adding a bedtime dose to help with overnight stiffness, which she would like to try.  Compared glycopyrrolate and topical options such as ipratropium spray. She would like to try oral option and discussed watching for constipation. She will check with pharmacy and may consider [facility] if not covered locally.     Depression/Anxiety/Sleep:   Improving. Discussed staying on the same medications for a little while since symptoms have only recently been improving. She will continue follow up with psychiatry.    PLAN:                            -stop taking the 8pm carbidopa/levodopa dose. Notice whether you feel that med is worn off before your 9:30pm dose.  -start taking carbidopa/levodopa ER 50/200--1 tablet right at bedtime  -check on the cost of glycopyrrolate at your pharmacy. It is available at [facility] for about $12 for a 30 day supply. Let me know if you'd like to get it there.    Follow-up: I will call you on 5/29/25 at 8:30am    SUBJECTIVE/OBJECTIVE:                          Fabiana Hu is a 65 year old female seen for a follow-up visit.       Reason for visit: med check.    Allergies/ADRs: Reviewed in chart  Past Medical History: Reviewed in chart  Tobacco: She reports that she has never smoked. She has never used smokeless tobacco.  Alcohol: not currently using    Medication Adherence/Access: no issues reported.    Parkinsonism:   Medication Dose Timing    7:30am 11:30am 4:30pm 8pm 9:30pm 10:15pm   Carbidopa/levodopa 25/100 0.5 0.5 0.5 0.5  1    Gabapentin 300mg     1    Doxepin 10mg      1   Quetiapine 25mg       1      Patient reported that medication seems to be working well overall. Daytime  "symptoms are manageable.  Patient reported that her biggest struggle is feeling stiff during the night when she gets up to use the bathroom around 1:30am. Has tried taking 1/2 tab during the night, which might be helpful, but takes a while to start working. Generally able to get comfortable at bedtime and fall asleep without much issue after taking bedtime meds.  Notes that she constantly is drooling and has runny nose. Dr. Weston recently prescribed glycopyrrolate, though she isn't sure it is covered by insurance. Would like to try it.     Depression/Anxiety/Sleep:   -bupropion SR 100mg daily  -lithium 750mg daily at 8pm  -quetiapine 25mg at bedtime  -doxepin 10mg at bedtime  -gabapentin 300mg at bedtime (recently reduced)    Since last visit, bupropion was added to med regimen and reports that \"depression has lifted,\" feeling less anxious. She has follow up with psychiatry in the coming weeks and hopes to be able to reduce lithium dose. Has been tolerating medications without issue.     ----------------    I spent 20 minutes with this patient today. All changes were made via collaborative practice agreement with Ramon Weston.     A summary of these recommendations was sent via clinic portal.    Yancy Wright PharmD  Medication Therapy Management Pharmacist  [facility]    Telemedicine Visit Details  The patient's medications can be safely assessed via a telemedicine encounter.  Type of service:  Telephone visit  Originating Location (pt. Location): Home    Distant Location (provider location):  Off-site  Start Time:  8:30am  End Time:  8:50am     Medication Therapy Recommendations  Parkinsonism, unspecified Parkinsonism type (H)   1 Current Medication: carbidopa-levodopa (SINEMET CR)  MG CR tablet   Current Medication Sig: Take 1 tablet by mouth at bedtime.   Rationale: Synergistic therapy - Needs additional medication therapy - Indication   Recommendation: Start " Medication - per plan   Status: Accepted per CPA   Identified Date: 4/24/2025 Completed Date: 4/24/2025

## 2025-04-24 NOTE — PATIENT INSTRUCTIONS
"Recommendations from today's MTM visit:                                                         -stop taking the 8pm carbidopa/levodopa dose. Notice whether you feel that med is worn off before your 9:30pm dose.  -start taking carbidopa/levodopa ER 50/200--1 tablet right at bedtime  -check on the cost of glycopyrrolate at your pharmacy. It is available at [facility] for about $12 for a 30 day supply. Let me know if you'd like to get it there.    Follow-up: I will call you on 5/29/25 at 8:30am    It was great speaking with you today.  I value your experience and would be very thankful for your time in providing feedback in our clinic survey. In the next few days, you may receive an email or text message from [employer] with a link to a survey related to your  clinical pharmacist.\"     To schedule another MTM appointment, please call the clinic directly or you may call the MTM scheduling line at [phone].    My Clinical Pharmacist's contact information:                                                      Please feel free to contact me with any questions or concerns you have.       Yancy Wright, PharmD  Medication Therapy Management Pharmacist  [facility]    "

## 2025-04-25 NOTE — TELEPHONE ENCOUNTER
PA Initiation    Medication: GLYCOPYRROLATE 1 MG PO TABS  Insurance Company: [employer] Part D - Phone [phone] Fax [phone]  Pharmacy Filling the Rx: [id number] [address] AT [facility]  Filling Pharmacy Phone: [phone]  Filling Pharmacy Fax:    Start Date: 4/25/2025  Retail Pharmacy Prior Authorization Team   Phone: [phone]

## 2025-04-29 ENCOUNTER — TELEPHONE (OUTPATIENT)
Dept: UROLOGY | Facility: CLINIC | Age: 65
End: 2025-04-29
Payer: MEDICARE

## 2025-04-29 NOTE — TELEPHONE ENCOUNTER
Patient confirmed scheduled appointment:  Date: 5/8  Time: 2:45  Visit type: RTN  Provider: Arjun  Location: [facility]  Testing/imaging: NA  Additional notes: NA

## 2025-04-29 NOTE — TELEPHONE ENCOUNTER
Prior Authorization Approval    Medication: GLYCOPYRROLATE 1 MG PO TABS  Authorization Effective Date: 4/25/2025  Authorization Expiration Date: 12/31/2025  Approved Dose/Quantity:   Reference #:     Insurance Company: [employer] Part D - Phone [phone] Fax [phone]  Expected CoPay: $    CoPay Card Available:      Financial Assistance Needed: No  Which Pharmacy is filling the prescription[id number] [address] AT [facility]  Pharmacy Notified: Yes  Patient Notified: Instructed pharmacy to notify patient once order is ready.

## 2025-04-29 NOTE — TELEPHONE ENCOUNTER
Left Voicemail (1st Attempt) for the patient to call back and schedule the following:    Appointment type: RTN  Provider: KRISTIN  Return date: 5/8  Specialty phone number: [phone]  Additional appointment(s) needed: NA  Additonal Notes: NA

## 2025-05-07 ENCOUNTER — PRE VISIT (OUTPATIENT)
Dept: UROLOGY | Facility: CLINIC | Age: 65
End: 2025-05-07
Payer: MEDICARE

## 2025-05-07 NOTE — TELEPHONE ENCOUNTER
Reason for visit: follow-up      Relevant information: last seen 9/5/24 for urge incontinence of urine and dyskinesia due to Parkinson's disease. Was started on tropsium.     Records/imaging/labs/orders: all records available    Pt called: no need for a call    At Rooming: Have patient empty their bladder and PVR. Get a urine sample and dip the urine if they have UTI symptoms.    Dulce Valencia  5/7/2025  3:30 PM

## 2025-05-08 ENCOUNTER — OFFICE VISIT (OUTPATIENT)
Dept: UROLOGY | Facility: CLINIC | Age: 65
End: 2025-05-08
Payer: MEDICARE

## 2025-05-08 VITALS
BODY MASS INDEX: 23.75 KG/M2 | HEIGHT: 60 IN | HEART RATE: 78 BPM | OXYGEN SATURATION: 96 % | DIASTOLIC BLOOD PRESSURE: 69 MMHG | SYSTOLIC BLOOD PRESSURE: 119 MMHG | WEIGHT: 121 LBS

## 2025-05-08 DIAGNOSIS — N39.41 URGE INCONTINENCE OF URINE: Primary | ICD-10-CM

## 2025-05-08 DIAGNOSIS — G20.B1 DYSKINESIA DUE TO PARKINSON'S DISEASE (H): ICD-10-CM

## 2025-05-08 PROCEDURE — 99214 OFFICE O/P EST MOD 30 MIN: CPT | Mod: 25 | Performed by: UROLOGY

## 2025-05-08 PROCEDURE — 3074F SYST BP LT 130 MM HG: CPT | Performed by: UROLOGY

## 2025-05-08 PROCEDURE — 51798 US URINE CAPACITY MEASURE: CPT | Performed by: UROLOGY

## 2025-05-08 PROCEDURE — 3078F DIAST BP <80 MM HG: CPT | Performed by: UROLOGY

## 2025-05-08 PROCEDURE — 1126F AMNT PAIN NOTED NONE PRSNT: CPT | Performed by: UROLOGY

## 2025-05-08 RX ORDER — MELATONIN/PYRIDOXINE HCL (B6) 10 MG-10MG
10 TABLET,IMMED, EXTENDED RELEASE, BIPHASIC ORAL DAILY
COMMUNITY

## 2025-05-08 ASSESSMENT — PAIN SCALES - GENERAL: PAINLEVEL_OUTOF10: NO PAIN (0)

## 2025-05-08 NOTE — PROGRESS NOTES
May 8, 2025    Fabiana was seen today for incontinence.    Diagnoses and all orders for this visit:    Urge incontinence of urine  -     MEASURE POST-VOID RESIDUAL URINE/BLADDER CAPACITY, US NON-IMAGING  -     oxyBUTYnin (OXYTROL) 3.9 MG/24HR BIW patch; Place 1 patch over 96 hours onto the skin twice a week.    Dyskinesia due to Parkinson's disease (H)  -     MEASURE POST-VOID RESIDUAL URINE/BLADDER CAPACITY, US NON-IMAGING  -     oxyBUTYnin (OXYTROL) 3.9 MG/24HR BIW patch; Place 1 patch over 96 hours onto the skin twice a week.    Discussed anticholinergic vs beta 3.  She states that the anticholinergic really helped just gave her miserable dry mouth.  Will trial oxybutynin patch.  If not better consider  beta 3    RTC 3 months, sooner if needed    5 minutes were spent today on the day of the encounter in reviewing the EMR, direct patient care including prescription medication management, coordination of care and documentation    Sandhya Díaz MD MPH  (she/her/hers)   of Urology  [facility]    Subjective    Trospium worked well but gave her miserable dry mouth, She denies any changes in health since last visit    /69   Pulse 78   Ht 1.524 m (5')   Wt 54.9 kg (121 lb)   SpO2 96%   BMI 23.63 kg/m    GENERAL: healthy, alert and no distress  EYES: Eyes grossly normal to inspection, conjunctivae and sclerae normal  HENT: normal cephalic/atraumatic.  External ears, nose and mouth without ulcers or lesions.  RESP: no audible wheeze, cough, or visible cyanosis.  No visible retractions or increased work of breathing.  Able to speak fully in complete sentences.  NEURO: Cranial nerves grossly intact, mentation intact and speech normal  PSYCH: mentation appears normal, affect normal/bright, judgement and insight intact, normal speech and appearance well-groomed      CC  Patient Care Team:  No Ref-Primary, Physician as PCP - Yancy Rocha, PharmD as Pharmacist  (Pharmacist)  Mirian Keene APRN CNP as Nurse Practitioner (Neurology)  Brandt Beckman MD as MD (Urology)  Yancy Wright, PharmD as Assigned MTM Pharmacist  Madhav Shah NP as Student  Beth Felix APRN CNP as Nurse Practitioner (OB/Gyn)  Sandhya Díaz MD as MD (Urology)  Beth Felix APRN CNP as Referring Physician (OB/Gyn)  Beth Felix APRN CNP as Assigned OBGYN Provider  Sandhya Díaz MD as Assigned Surgical Provider  Ofstedal, Guy AMAYA MD as MD (Family Medicine)  Barrera, Negro Miguel MD as MD (Neurology)  Larissa Talley APRN CNP as Nurse Practitioner (Psychiatry)  Kathy Garcia as Student  Mirian Keene APRN CNP as Assigned Neuroscience Provider  SELF, REFERRED

## 2025-05-08 NOTE — LETTER
5/8/2025       RE: Fabiana Hu  [address]  [place] [address]     Dear Colleague,    Thank you for referring your patient, Fabiana Hu, to the [facility] [place] [facility]. Please see a copy of my visit note below.    May 8, 2025    Fabiana was seen today for incontinence.    Diagnoses and all orders for this visit:    Urge incontinence of urine  -     MEASURE POST-VOID RESIDUAL URINE/BLADDER CAPACITY, US NON-IMAGING  -     oxyBUTYnin (OXYTROL) 3.9 MG/24HR BIW patch; Place 1 patch over 96 hours onto the skin twice a week.    Dyskinesia due to Parkinson's disease (H)  -     MEASURE POST-VOID RESIDUAL URINE/BLADDER CAPACITY, US NON-IMAGING  -     oxyBUTYnin (OXYTROL) 3.9 MG/24HR BIW patch; Place 1 patch over 96 hours onto the skin twice a week.    Discussed anticholinergic vs beta 3.  She states that the anticholinergic really helped just gave her miserable dry mouth.  Will trial oxybutynin patch.  If not better consider  beta 3    RTC 3 months, sooner if needed    5 minutes were spent today on the day of the encounter in reviewing the EMR, direct patient care including prescription medication management, coordination of care and documentation    Sandhya Díaz MD MPH  (she/her/hers)   of Urology  [facility]    Subjective    Trospium worked well but gave her miserable dry mouth, She denies any changes in health since last visit    /69   Pulse 78   Ht 1.524 m (5')   Wt 54.9 kg (121 lb)   SpO2 96%   BMI 23.63 kg/m    GENERAL: healthy, alert and no distress  EYES: Eyes grossly normal to inspection, conjunctivae and sclerae normal  HENT: normal cephalic/atraumatic.  External ears, nose and mouth without ulcers or lesions.  RESP: no audible wheeze, cough, or visible cyanosis.  No visible retractions or increased work of breathing.  Able to speak fully in complete sentences.  NEURO: Cranial nerves  grossly intact, mentation intact and speech normal  PSYCH: mentation appears normal, affect normal/bright, judgement and insight intact, normal speech and appearance well-groomed      CC  Patient Care Team:  No Ref-Primary, Physician as PCP - General  Yancy Wright, PharmD as Pharmacist (Pharmacist)  Mirian Keene APRN CNP as Nurse Practitioner (Neurology)  Brandt Beckman MD as MD (Urology)  Yancy Wright, PharmD as Assigned MT Pharmacist  Madhav Shah NP as Student  Beth Felix APRN CNP as Nurse Practitioner (OB/Gyn)  Sandhya Díaz MD as MD (Urology)  Beth Felix APRN CNP as Referring Physician (OB/Gyn)  Beth Felix APRN CNP as Assigned OBGYN Provider  Sandhya Díaz MD as Assigned Surgical Provider  Sabrinastedaramses, Guy AMAYA MD as MD (Family Medicine)  Negro Rust MD as MD (Neurology)  Larissa Talley APRN CNP as Nurse Practitioner (Psychiatry)  Kathy Garcia as Student  Mirian Keene APRN CNP as Assigned Neuroscience Provider  SELF, REFERRED      Again, thank you for allowing me to participate in the care of your patient.      Sincerely,    Sandhya Díaz MD

## 2025-05-08 NOTE — NURSING NOTE
Chief Complaint   Patient presents with    Incontinence     Trospium gave her dry mouth. Went back on Gemtesa, which helps her OAB. Cannot afford the Gemtesa.        Blood pressure 119/69, pulse 78, height 1.524 m (5'), weight 54.9 kg (121 lb), SpO2 96%. Body mass index is 23.63 kg/m .    Patient Active Problem List   Diagnosis    Arteriovenous malformation (AVM)    Atypical nevus    Colon adenomas    Hypothyroidism    Fibroids    Major depressive disorder, recurrent episode    Parkinson's disease (H)    Psoriasis    Seasonal allergies    Severe episode of recurrent major depressive disorder, with psychotic features (H)    Varicose veins of other specified sites    Urinary incontinence    Adenomatous polyp of colon    Seasonal allergic rhinitis    Major depressive disorder    Dyskinesia due to Parkinson's disease (H)    Sialorrhea    Lower urinary tract symptoms (LUTS)    Pelvic floor weakness in female    Benign neoplasm of ascending colon    Benign neoplasm of transverse colon    Family history of colon cancer    FH: stomach cancer    H. pylori infection    History of colonic polyps    Polyp of colon    Chest pain, unspecified    E. coli UTI    Helicobacter pylori infection    Lump or mass in breast    Melanoma of skin (H)       No Known Allergies    Current Outpatient Medications   Medication Sig Dispense Refill    artificial saliva (BIOTENE MT) AERS spray Take 1 spray by mouth every 6 hours as needed for dry mouth. 10 mL 3    buPROPion (WELLBUTRIN SR) 100 MG 12 hr tablet Take 100 mg by mouth daily.      carbidopa-levodopa (SINEMET CR)  MG CR tablet Take 1 tablet by mouth at bedtime. 30 tablet 2    carbidopa-levodopa (SINEMET)  MG tablet 1/2 tab at 7:30am, 11:30am, 4:30pm and 8pm, 1 tab @930p and 1/2 during the night. 360 tablet 3    doxepin (SINEQUAN) 10 MG capsule Take 10 mg by mouth at bedtime.      gabapentin (NEURONTIN) 300 MG capsule Take 300 mg by mouth at bedtime.      GEMTESA 75 MG TABS  tablet TAKE 1 TABLET BY MOUTH EVERY DAY 90 tablet 1    glycopyrrolate (ROBINUL) 1 MG tablet 1 tab 2 to 3 times per day for drooling 60 tablet 11    levothyroxine (SYNTHROID/LEVOTHROID) 75 MCG tablet Take 1 tablet by mouth daily      lithium (ESKALITH CR/LITHOBID) 450 MG CR tablet Take 450 mg by mouth daily. At 8pm      lithium ER (LITHOBID) 300 MG CR tablet Take 300 mg by mouth daily. At 8pm      Melatonin 10-10 MG TBCR Take 10 mg by mouth daily.      QUEtiapine (SEROQUEL) 25 MG tablet Take 25 mg by mouth at bedtime. For sleep and anxiety      vitamin D3 (CHOLECALCIFEROL) 50 mcg (2000 units) tablet Take 1 tablet by mouth daily.         Social History     Tobacco Use    Smoking status: Never    Smokeless tobacco: Never   Substance Use Topics    Alcohol use: Yes       Dulce Valencia  5/8/2025  2:53 PM

## 2025-05-08 NOTE — NURSING NOTE
Patient was escorted to the restroom and asked to void per provider request.     PVR by bladder scanner x3 scans: 20mL    Dulce Valencia  May 8, 2025  2:53 PM

## 2025-05-18 ENCOUNTER — HEALTH MAINTENANCE LETTER (OUTPATIENT)
Age: 65
End: 2025-05-18

## 2025-05-26 ENCOUNTER — PATIENT OUTREACH (OUTPATIENT)
Dept: CARE COORDINATION | Facility: CLINIC | Age: 65
End: 2025-05-26
Payer: MEDICARE

## 2025-05-28 ENCOUNTER — PATIENT OUTREACH (OUTPATIENT)
Dept: CARE COORDINATION | Facility: CLINIC | Age: 65
End: 2025-05-28
Payer: MEDICARE

## 2025-05-29 ENCOUNTER — LAB (OUTPATIENT)
Dept: LAB | Facility: CLINIC | Age: 65
End: 2025-05-29
Payer: MEDICARE

## 2025-05-29 ENCOUNTER — VIRTUAL VISIT (OUTPATIENT)
Dept: PHARMACY | Facility: CLINIC | Age: 65
End: 2025-05-29
Attending: PSYCHIATRY & NEUROLOGY
Payer: MEDICARE

## 2025-05-29 DIAGNOSIS — Z51.81 ENCOUNTER FOR THERAPEUTIC DRUG LEVEL MONITORING: Primary | ICD-10-CM

## 2025-05-29 DIAGNOSIS — R73.09 DISORDER OF BLOOD GLUCOSE: ICD-10-CM

## 2025-05-29 DIAGNOSIS — G20.C PARKINSONISM, UNSPECIFIED PARKINSONISM TYPE (H): Primary | ICD-10-CM

## 2025-05-29 DIAGNOSIS — G62.9 NEUROPATHY: ICD-10-CM

## 2025-05-29 LAB
ANION GAP SERPL CALCULATED.3IONS-SCNC: 9 MMOL/L (ref 7–15)
BUN SERPL-MCNC: 19.1 MG/DL (ref 8–23)
CALCIUM SERPL-MCNC: 9.6 MG/DL (ref 8.8–10.4)
CHLORIDE SERPL-SCNC: 107 MMOL/L (ref 98–107)
CREAT SERPL-MCNC: 0.93 MG/DL (ref 0.51–0.95)
CRP SERPL-MCNC: <3 MG/L
EGFRCR SERPLBLD CKD-EPI 2021: 67 ML/MIN/1.73M2
ERYTHROCYTE [SEDIMENTATION RATE] IN BLOOD BY WESTERGREN METHOD: 9 MM/HR (ref 0–30)
GLUCOSE SERPL-MCNC: 101 MG/DL (ref 70–99)
HCO3 SERPL-SCNC: 25 MMOL/L (ref 22–29)
LITHIUM SERPL-SCNC: 0.7 MMOL/L (ref 0.6–1.2)
POTASSIUM SERPL-SCNC: 4.4 MMOL/L (ref 3.4–5.3)
SODIUM SERPL-SCNC: 141 MMOL/L (ref 135–145)

## 2025-05-29 PROCEDURE — 86038 ANTINUCLEAR ANTIBODIES: CPT | Performed by: PSYCHIATRY & NEUROLOGY

## 2025-05-29 PROCEDURE — 86334 IMMUNOFIX E-PHORESIS SERUM: CPT | Mod: 26 | Performed by: PATHOLOGY

## 2025-05-29 PROCEDURE — 82607 VITAMIN B-12: CPT | Performed by: PSYCHIATRY & NEUROLOGY

## 2025-05-29 PROCEDURE — 83036 HEMOGLOBIN GLYCOSYLATED A1C: CPT | Performed by: PSYCHIATRY & NEUROLOGY

## 2025-05-29 PROCEDURE — 86334 IMMUNOFIX E-PHORESIS SERUM: CPT | Performed by: PATHOLOGY

## 2025-05-29 PROCEDURE — 80178 ASSAY OF LITHIUM: CPT | Performed by: PSYCHIATRY & NEUROLOGY

## 2025-05-29 PROCEDURE — 83921 ORGANIC ACID SINGLE QUANT: CPT | Performed by: PSYCHIATRY & NEUROLOGY

## 2025-05-29 NOTE — PATIENT INSTRUCTIONS
"Recommendations from today's MTM visit:                                                       -start taking melatonin around 7pm  -move carbidopa/levodopa ER 50/200 dose to 10:30pm, right at bedtime    Follow-up: 6 months or sooner if needed    It was great speaking with you today.  I value your experience and would be very thankful for your time in providing feedback in our clinic survey. In the next few days, you may receive an email or text message from [employer] with a link to a survey related to your  clinical pharmacist.\"     To schedule another MTM appointment, please call the clinic directly or you may call the MTM scheduling line at [phone].    My Clinical Pharmacist's contact information:                                                      Please feel free to contact me with any questions or concerns you have.      Yancy Wright, PharmD  Medication Therapy Management Pharmacist  [facility]      "

## 2025-05-29 NOTE — PROGRESS NOTES
Medication Therapy Management (MTM) Encounter    ASSESSMENT:                            Medication Adherence/Access: No issues identified.    Parkinsonism:   Adding ER carbidopa/levodopa at bedtime has been helpful for more consolidated sleep and fewer symptoms overnight, though she may derive more benefit from taking it right at bedtime.  Since she is having more trouble with falling asleep, advised taking melatonin earlier in the night for now.     PLAN:                            -start taking melatonin around 7pm  -move carbidopa/levodopa ER 50/200 dose to 10:30pm, right at bedtime    Follow-up: 6 months or sooner if needed    SUBJECTIVE/OBJECTIVE:                          Fabiana Hu is a 65 year old female seen for a follow-up visit.       Reason for visit: med check.    Allergies/ADRs: Reviewed in chart  Past Medical History: Reviewed in chart  Tobacco: She reports that she has never smoked. She has never used smokeless tobacco.  Alcohol: not currently using    Medication Adherence/Access: no issues reported.    Parkinsonism:   Medication Dose Timing    7:30am 11:30am 4:30pm 8pm 9:30pm 10:15pm   Carbidopa/levodopa 25/100 0.5 0.5 0.5   1     Carbidopa/levodopa ER 50/200     1    Gabapentin 300mg         1     Doxepin 10mg           1   Melatonin 10mg      1      At last visit on 4/24/25, made the following changes  -stop taking the 8pm carbidopa/levodopa dose. Notice whether you feel that med is worn off before your 9:30pm dose.  -start taking carbidopa/levodopa ER 50/200--1 tablet right at bedtime    Since last visit, quetiapine was discontinued by psychiatry since her mood has been better. She has been off of it for about one week and has noticed that is has been a bit harder to fall asleep. She has been taking melatonin right at bedtime.   Since adding the ER bedtime dose, she is no longer waking around 1am, but still waking around 3am. Stiffness is better at this time and able to shift position and get back  to sleep.  Daytime symptoms are unchanged and remain manageable.    ----------------    I spent 30 minutes with this patient today. All changes were made via collaborative practice agreement with Ramon Weston.     A summary of these recommendations was sent via clinic portal.    Yancy Wright PharmD  Medication Therapy Management Pharmacist  [facility] Neurology [facility]      Telemedicine Visit Details  The patient's medications can be safely assessed via a telemedicine encounter.  Type of service:  Telephone visit  Originating Location (pt. Location): Home    Distant Location (provider location):  Off-site  Start Time: 8:30am  End Time: 9:00am     Medication Therapy Recommendations  Parkinson's disease (H)   1 Current Medication: Melatonin 10-10 MG TBCR   Current Medication Sig: Take 10 mg by mouth daily.   Rationale: Condition refractory to medication - Ineffective medication - Effectiveness   Recommendation: Change Administration Time - take earlier in the night   Status: Patient Agreed - Adherence/Education   Identified Date: 5/29/2025   Note: Take carbidopa/levodopa ER later in the night, per plan

## 2025-06-01 PROBLEM — R89.9 ABNORMAL LABORATORY TEST: Status: ACTIVE | Noted: 2025-06-01

## 2025-06-01 LAB — PYRIDOXAL PHOS SERPL-SCNC: 344.8 NMOL/L

## 2025-06-04 DIAGNOSIS — E67.2 HYPERVITAMINOSIS B6: Primary | ICD-10-CM

## 2025-06-04 LAB — METHYLMALONATE SERPL-SCNC: 0.2 UMOL/L (ref 0–0.4)

## 2025-06-04 RX ORDER — BUSPIRONE HYDROCHLORIDE 30 MG/1
30 TABLET ORAL DAILY
COMMUNITY
Start: 2025-05-23

## 2025-06-12 ENCOUNTER — MYC MEDICAL ADVICE (OUTPATIENT)
Dept: PHARMACY | Facility: CLINIC | Age: 65
End: 2025-06-12
Payer: MEDICARE

## 2025-06-13 NOTE — TELEPHONE ENCOUNTER
Covering for Yancy Wright, JacquiD    Gregoria Palmer, Pharm.D., MPH  Medication Therapy Management Pharmacist   [facility] Neurology [facility]

## 2025-06-18 ENCOUNTER — TELEPHONE (OUTPATIENT)
Dept: UROLOGY | Facility: CLINIC | Age: 65
End: 2025-06-18
Payer: MEDICARE

## 2025-06-18 NOTE — TELEPHONE ENCOUNTER
----- Message from Sandhya Díaz sent at 5/8/2025  3:13 PM CDT -----  Regarding: Latisha Hairston    Can the rep help her get this for a more affordable cost?    Thanks    C

## 2025-06-18 NOTE — TELEPHONE ENCOUNTER
Mailed copy of the information for Latisha Dhillon, RN, BSN, PHN  Care Coordinator Urology  [facility] [place]  Urology [facility]  [phone]

## 2025-07-01 ENCOUNTER — TELEPHONE (OUTPATIENT)
Dept: UROLOGY | Facility: CLINIC | Age: 65
End: 2025-07-01
Payer: MEDICARE

## 2025-07-01 NOTE — TELEPHONE ENCOUNTER
Her appointment in July was moved sooner to 5/8. Pt was seen on 5/8 and told to return in 3 months, which is scheduled on 9/9. LVM to discuss with pt. If she wants, we could try and find a sooner appointment.

## 2025-07-01 NOTE — TELEPHONE ENCOUNTER
" [facility] Call [facility]    Phone Message    May a detailed message be left on voicemail: yes     Reason for Call: Other: Patient doesn't remember cancelling her July appts with Arjun. Please call her back to discuss as she \"needs to see somebody\" soon for incontinence. Thank you!     Action Taken: Message routed to:  [facility] [place]    Travel Screening: Not Applicable     Date of Service:                                                                     "

## 2025-07-22 ENCOUNTER — OFFICE VISIT (OUTPATIENT)
Dept: NEUROLOGY | Facility: CLINIC | Age: 65
End: 2025-07-22
Payer: MEDICARE

## 2025-07-22 VITALS — SYSTOLIC BLOOD PRESSURE: 102 MMHG | HEART RATE: 66 BPM | OXYGEN SATURATION: 99 % | DIASTOLIC BLOOD PRESSURE: 65 MMHG

## 2025-07-22 DIAGNOSIS — G20.B1 PARKINSON'S DISEASE WITH DYSKINESIA WITHOUT FLUCTUATING MANIFESTATIONS (H): Primary | ICD-10-CM

## 2025-07-22 DIAGNOSIS — R20.9 ABNORMAL SENSATION OF UPPER EXTREMITY: ICD-10-CM

## 2025-07-22 DIAGNOSIS — R20.9 ABNORMAL SENSATION OF LOWER EXTREMITY: ICD-10-CM

## 2025-07-22 DIAGNOSIS — G20.B1 DYSKINESIA DUE TO PARKINSON'S DISEASE (H): ICD-10-CM

## 2025-07-22 DIAGNOSIS — K11.7 SIALORRHEA: ICD-10-CM

## 2025-07-22 PROCEDURE — 99215 OFFICE O/P EST HI 40 MIN: CPT | Performed by: NURSE PRACTITIONER

## 2025-07-22 PROCEDURE — 3078F DIAST BP <80 MM HG: CPT | Performed by: NURSE PRACTITIONER

## 2025-07-22 PROCEDURE — G2211 COMPLEX E/M VISIT ADD ON: HCPCS | Performed by: NURSE PRACTITIONER

## 2025-07-22 PROCEDURE — 3074F SYST BP LT 130 MM HG: CPT | Performed by: NURSE PRACTITIONER

## 2025-07-22 ASSESSMENT — MOVEMENT DISORDERS SOCIETY - UNIFIED PARKINSONS DISEASE RATING SCALE (MDS-UPDRS)
HYGIENE: (1) SLIGHT: I AM SLOW, BUT I DO NOT NEED ANY HELP.
HANDWRITING: (2) MILD: SOME WORDS ARE UNCLEAR AND DIFFICULT TO READ.
EATING_TASKS: (1) SLIGHT: I AM SLOW, BUT I DO NOT NEED ANY HELP HANDLING MY FOOD AND HAVE NOT HAD FOOD SPILLS WHILE EATING.
DRESSING: (2) MILD: I AM SLOW AND NEED HELP FOR A FEW DRESSING TASKS (BUTTONS, BRACELETS).
TOTAL_SCORE: 19
HOBBIES_AND_OTHER_ACTIVITIES: (2) MILD: I HAVE SOME DIFFICULTY DOING THESE ACTIVITIES.
TREMOR: (2) MILD: SHAKING OR TREMOR CAUSES PROBLEMS WITH ONLY A FEW ACTIVITIES.
FREEZING: (0) NORMAL: NOT AT ALL (NO PROBLEMS).
GETTING_OUT_OF_BED_CAR_DEEP_CHAIR: (1) SLIGHT: I AM SLOW OR AWKWARD, BUT I USUALLY CAN DO IT ON MY FIRST TRY.
SPEECH: (2) MILD: MY SPEECH CAUSES PEOPLE TO ASK ME TO OCCASIONALLY REPEAT MYSELF, BUT NOT EVERYDAY.
CHEWING_AND_SWALLOWING: (0) NORMAL: NO PROBLEMS.
TURNING_IN_BED: (1) SLIGHT: I HAVE A BIT OF TROUBLE TURNING, BUT I DO NOT NEED ANY HELP.
SALIVA_AND_DROOLING: (4) SEVERE: I HAVE SO MUCH DROOLING THAT I REGULARLY NEED TO USE TISSUES OR A HANDKERCHIEF TO PROTECT MY CLOTHES.
WALKING_AND_BALANCE: (1) SLIGHT: I AM SLIGHTLY SLOW OR MAY DRAG A LEG.  I NEVER USE A WALKING AID.

## 2025-07-22 NOTE — PATIENT INSTRUCTIONS
Dear Ms. Fabiana Hu,    Thank you for coming today.  During your visit, we have discussed the following:     __  The arm and leg symptoms could be a side effect of medication (can discuss this with Dr. Wright, PharmD - Neurology Pharmacist), or restless leg as it improves when you get up and walk.     For now, let see if taking Gabapentin 300 mg cap at 7 pm improves your symptoms.     __  Meet with Dr. Wright, PharmD - Neurology Pharmacist to follow up the Gabapentin.     __  For dyskinesias, may consider adding Amantadine. This can be discussed in the future.        For now, stay on the same dose of short-acting & long-acting Sinemet.     __  I have sent a message to Dr. Weston what to do about the abnormal JOHN.     __  See Dr. Gutierrez for Botox injection.     __ Return in 3 months to see Dr. Weston. You may return sooner as needed.       For questions, you may send us a [employer] message or call [phone]    Fax number: [phone]    To make an appointment with one of our pharmacists, (Dr. Mahajan, Pharm.D., Dr. Wright, PharmD, or Dr. Palmer, Pharm.D.), call [phone].    Radha Keene, PAUL, APRN  [facility]

## 2025-07-22 NOTE — PROGRESS NOTES
ASSESSMENT:    Parkinson's Disease:      Dyskinesias:     Drooling:     Abnormal Sensation in arms and legs: Pt described symptoms as tingling, but it seems like restless leg syndrome as it occurs in the evenings when sitting and watching TV and improving when walking. Patient takes gabapentin ~ 9:30 PM (unsure why it was prescribed or who prescribed it.) It would be reasonable to try taking it around 7 PM to see if it improves symptoms.      PLAN:    The above assessment discussed, questions addressed, and the following patient instructions provided: -     __  The arm and leg symptoms could be a side effect of medication (can discuss this with Dr. Wright, PharmD - Neurology Pharmacist), or restless leg as it improves when you get up and walk.     For now, let see if taking Gabapentin 300 mg cap at 7 pm improves your symptoms.     __  Meet with Dr. Wright, PharmD - Neurology Pharmacist to follow up the Gabapentin.     __  For dyskinesias, may consider adding Amantadine. This can be discussed in the future.        For now, stay on the same dose of short-acting & long-acting Sinemet.     __  I have sent a message to Dr. Weston what to do about the abnormal JOHN.     __  See Dr. Gutierrez for Botox injection.     __ Return in 3 months to see Dr. Weston. You may return sooner as needed.               MOVEMENT DISORDERS CLINIC  [facility] LOCATION             PATIENT: Fabiana Hu    : 1960    DEN:  2025    REASON FOR VISIT: Parkinson's disease (PD) follow up.    HPI: Ms. Fabiana Hu is a 65 year old who came to the [facility] neurology clinic accompanied by her , Brandt, for a follow up visit.    Pertinent PD Hx: She was diagnosed with PD in Aug 2022. She didn't recall her exact symptoms that made her see a neurologist. Pt and her  said that she had stiffness, shuffling gait, difficulty turning in bed, and some tremors in Lt hand. Pt didn't recall how bad her symptoms were. She was stared on Sinemet at  "her initial visit at [facility]. She is experiencing dyskinesias on Sinemet.     She was last seen in the clinic on 4/22/2025 by Dr. Weston; and Dr. Wright, PharmD - Neurology Pharmacist on 5/29/2025. I saw her last on January 2025.      PD Update:  She is slower. Dyskinesias have worsened. It starts after she takes the 1st dose.  Patient is not sure if there is a pattern when dyskinesia also occur or become bothersome.    Pertinent  Medications Indication 7:30 am 11:30 am 4:30 pm 9:30 pm 10:15 pm   Sinemet 25/100 mg  PD 1/2 1/2 1/2     Sinemet 50/200 mg CR PD    1    Gabapentin 300 mg  Unsure? Mood?    1    Doxepin 10 mg Sleep     1   Melatonin 10 mg Sleep     1     Drooling:  This has gotten much worse. Per pt, due to difficulty getting to see Dr. Edgar, she saw Dr. Gutierrez. On 5/23/2025 - Was seen at [facility] [place] by Arnel Gutierrez MD - for Botox evaluation. Has a follow up at the end of this month for Botox injection. She also has a follow up with Dr. Edgar in Sep 2025.      Arms and lower leg symptoms: In the last 3 months, she has been having pins and needles and tingling in her lower arms and lower leg bilaterally. \"It's hard to explain.\" She reports symptoms come when sitting and watching TV around 8 pm or when going to bed. She has difficulty staying still when symptoms occur.  Getting up and walking makes is better.     Has been asked if it could be from lithium as she takes it around 8 PM.  She has been on lithium for years to treat mood.      Patient asked about her labs including vitamin B6 and JOHN, which were elevated.    MEDICATIONS:   Current Outpatient Medications   Medication Sig Dispense Refill    artificial saliva (BIOTENE MT) AERS spray Take 1 spray by mouth every 6 hours as needed for dry mouth. 10 mL 3    buPROPion (WELLBUTRIN SR) 100 MG 12 hr tablet Take 100 mg by mouth daily.      busPIRone HCl (BUSPAR) 30 MG tablet Take 30 mg by mouth daily.      " carbidopa-levodopa (SINEMET CR)  MG CR tablet Take 1 tablet by mouth at bedtime. 30 tablet 2    carbidopa-levodopa (SINEMET)  MG tablet 1/2 tab at 7:30am, 11:30am, 4:30pm and 8pm, 1 tab @930p and 1/2 during the night. 360 tablet 3    doxepin (SINEQUAN) 10 MG capsule Take 10 mg by mouth at bedtime.      gabapentin (NEURONTIN) 300 MG capsule Take 300 mg by mouth at bedtime.      GEMTESA 75 MG TABS tablet TAKE 1 TABLET BY MOUTH EVERY DAY 90 tablet 1    glycopyrrolate (ROBINUL) 1 MG tablet 1 tab 2 to 3 times per day for drooling 60 tablet 11    levothyroxine (SYNTHROID/LEVOTHROID) 75 MCG tablet Take 1 tablet by mouth daily      lithium (ESKALITH CR/LITHOBID) 450 MG CR tablet Take 450 mg by mouth daily. At 8pm      lithium ER (LITHOBID) 300 MG CR tablet Take 300 mg by mouth daily. At 8pm      Melatonin 10-10 MG TBCR Take 10 mg by mouth daily.      vitamin D3 (CHOLECALCIFEROL) 50 mcg (2000 units) tablet Take 1 tablet by mouth daily.      oxyBUTYnin (OXYTROL) 3.9 MG/24HR BIW patch Place 1 patch over 96 hours onto the skin twice a week. 8 patch 3     Current Facility-Administered Medications   Medication Dose Route Frequency Provider Last Rate Last Admin    botulinum toxin type B (MYOBLOC) injection 5,000 Units  5,000 Units Intramuscular Q12 weeks    2,750 Units at 03/13/25 1316    botulinum toxin type B (MYOBLOC) injection 5,000 Units  5,000 Units Intramuscular See Admin Instructions         botulinum toxin type B (MYOBLOC) Solution 2,500 Units  2,500 Units Intramuscular See Admin Instructions Jammie West MD   2,500 Units at 04/30/24 1646       PHYSICAL EXAM:    VITAL SIGNS:  Blood pressure 102/65, pulse 66, SpO2 99%.     GENERAL:  Ms. Hu is a pleasant  female who is well-groomed and well-developed sitting comfortably in the exam room without any distress.  Affect is appropriate.    Pt has moderate drooling and was using tissue.     Vibratory sensation, pinprick, and temperature sensation in  both upper and lower extremities are intact and equal.      Dyskinesias in RUE & RLE, absent in Left body.     Today I spent 50 minutes caring for the patient. Time was spent in reviewing records, obtaining history,  answering questions, examining, and documentation.    Radha Keene, PAUL, APRN  [facility]    The longitudinal plan of care for the diagnosis(es)/condition(s) as documented were addressed during this visit. Due to the added complexity in care, I will continue to support Fabiana in the subsequent management and with ongoing continuity of care.

## 2025-07-22 NOTE — LETTER
2025       RE: Fabiana Hu   [address]  [facility] [address]     Dear Colleague,    Thank you for referring your patient, Fabiana Hu, to the [facility] [place] at [facility]. Please see a copy of my visit note below.      ASSESSMENT:    Parkinson's Disease:      Dyskinesias:     Drooling:     Abnormal Sensation in arms and legs: Pt described symptoms as tingling, but it seems like restless leg syndrome as it occurs in the evenings when sitting and watching TV and improving when walking. Patient takes gabapentin ~ 9:30 PM (unsure why it was prescribed or who prescribed it.) It would be reasonable to try taking it around 7 PM to see if it improves symptoms.      PLAN:    The above assessment discussed, questions addressed, and the following patient instructions provided: -     __  The arm and leg symptoms could be a side effect of medication (can discuss this with Dr. Wright, PharmD - Neurology Pharmacist), or restless leg as it improves when you get up and walk.     For now, let see if taking Gabapentin 300 mg cap at 7 pm improves your symptoms.     __  Meet with Dr. Wright, PharmD - Neurology Pharmacist to follow up the Gabapentin.     __  For dyskinesias, may consider adding Amantadine. This can be discussed in the future.        For now, stay on the same dose of short-acting & long-acting Sinemet.     __  I have sent a message to Dr. Weston what to do about the abnormal JOHN.     __  See Dr. Gutierrez for Botox injection.     __ Return in 3 months to see Dr. Weston. You may return sooner as needed.               MOVEMENT DISORDERS CLINIC  [facility] LOCATION             PATIENT: Fabiana Hu    : 1960    DEN:  2025    REASON FOR VISIT: Parkinson's disease (PD) follow up.    HPI: Ms. Fabiana Hu is a 65 year old who came to the [facility] neurology clinic accompanied by her , Brandt, for a follow up visit.    Pertinent  "PD Hx: She was diagnosed with PD in Aug 2022. She didn't recall her exact symptoms that made her see a neurologist. Pt and her  said that she had stiffness, shuffling gait, difficulty turning in bed, and some tremors in Lt hand. Pt didn't recall how bad her symptoms were. She was stared on Sinemet at her initial visit at [facility]. She is experiencing dyskinesias on Sinemet.     She was last seen in the clinic on 4/22/2025 by Dr. Weston; and Dr. Wright, PharmD - Neurology Pharmacist on 5/29/2025. I saw her last on January 2025.      PD Update:  She is slower. Dyskinesias have worsened. It starts after she takes the 1st dose.  Patient is not sure if there is a pattern when dyskinesia also occur or become bothersome.    Pertinent  Medications Indication 7:30 am 11:30 am 4:30 pm 9:30 pm 10:15 pm   Sinemet 25/100 mg  PD 1/2 1/2 1/2     Sinemet 50/200 mg CR PD    1    Gabapentin 300 mg  Unsure? Mood?    1    Doxepin 10 mg Sleep     1   Melatonin 10 mg Sleep     1     Drooling:  This has gotten much worse. Per pt, due to difficulty getting to see Dr. Edgar, she saw Dr. Gutierrez. On 5/23/2025 - Was seen at [facility] [place] by Arnel Gutierrez MD - for Botox evaluation. Has a follow up at the end of this month for Botox injection. She also has a follow up with Dr. Edgar in Sep 2025.      Arms and lower leg symptoms: In the last 3 months, she has been having pins and needles and tingling in her lower arms and lower leg bilaterally. \"It's hard to explain.\" She reports symptoms come when sitting and watching TV around 8 pm or when going to bed. She has difficulty staying still when symptoms occur.  Getting up and walking makes is better.     Has been asked if it could be from lithium as she takes it around 8 PM.  She has been on lithium for years to treat mood.      Patient asked about her labs including vitamin B6 and JOHN, which were elevated.    MEDICATIONS:   Current Outpatient " Medications   Medication Sig Dispense Refill     artificial saliva (BIOTENE MT) AERS spray Take 1 spray by mouth every 6 hours as needed for dry mouth. 10 mL 3     buPROPion (WELLBUTRIN SR) 100 MG 12 hr tablet Take 100 mg by mouth daily.       busPIRone HCl (BUSPAR) 30 MG tablet Take 30 mg by mouth daily.       carbidopa-levodopa (SINEMET CR)  MG CR tablet Take 1 tablet by mouth at bedtime. 30 tablet 2     carbidopa-levodopa (SINEMET)  MG tablet 1/2 tab at 7:30am, 11:30am, 4:30pm and 8pm, 1 tab @930p and 1/2 during the night. 360 tablet 3     doxepin (SINEQUAN) 10 MG capsule Take 10 mg by mouth at bedtime.       gabapentin (NEURONTIN) 300 MG capsule Take 300 mg by mouth at bedtime.       GEMTESA 75 MG TABS tablet TAKE 1 TABLET BY MOUTH EVERY DAY 90 tablet 1     glycopyrrolate (ROBINUL) 1 MG tablet 1 tab 2 to 3 times per day for drooling 60 tablet 11     levothyroxine (SYNTHROID/LEVOTHROID) 75 MCG tablet Take 1 tablet by mouth daily       lithium (ESKALITH CR/LITHOBID) 450 MG CR tablet Take 450 mg by mouth daily. At 8pm       lithium ER (LITHOBID) 300 MG CR tablet Take 300 mg by mouth daily. At 8pm       Melatonin 10-10 MG TBCR Take 10 mg by mouth daily.       vitamin D3 (CHOLECALCIFEROL) 50 mcg (2000 units) tablet Take 1 tablet by mouth daily.       oxyBUTYnin (OXYTROL) 3.9 MG/24HR BIW patch Place 1 patch over 96 hours onto the skin twice a week. 8 patch 3     Current Facility-Administered Medications   Medication Dose Route Frequency Provider Last Rate Last Admin     botulinum toxin type B (MYOBLOC) injection 5,000 Units  5,000 Units Intramuscular Q12 weeks    2,750 Units at 03/13/25 1316     botulinum toxin type B (MYOBLOC) injection 5,000 Units  5,000 Units Intramuscular See Admin Instructions          botulinum toxin type B (MYOBLOC) Solution 2,500 Units  2,500 Units Intramuscular See Admin Instructions Jammie West MD   2,500 Units at 04/30/24 1646       PHYSICAL EXAM:    VITAL SIGNS:  Blood  pressure 102/65, pulse 66, SpO2 99%.     GENERAL:  Ms. Hu is a pleasant  female who is well-groomed and well-developed sitting comfortably in the exam room without any distress.  Affect is appropriate.    Pt has moderate drooling and was using tissue.     Vibratory sensation, pinprick, and temperature sensation in both upper and lower extremities are intact and equal.      Dyskinesias in RUE & RLE, absent in Left body.     Today I spent 50 minutes caring for the patient. Time was spent in reviewing records, obtaining history,  answering questions, examining, and documentation.    Radha Keene DNP, APRN  [facility]    The longitudinal plan of care for the diagnosis(es)/condition(s) as documented were addressed during this visit. Due to the added complexity in care, I will continue to support Fabiana in the subsequent management and with ongoing continuity of care.        Again, thank you for allowing me to participate in the care of your patient.      Sincerely,    SHLOMO Mederos CNP

## 2025-07-24 DIAGNOSIS — G20.C PARKINSONISM, UNSPECIFIED PARKINSONISM TYPE (H): ICD-10-CM

## 2025-07-25 NOTE — TELEPHONE ENCOUNTER
Neuroscience Clinic Task Note    TASK    Neurology Rx Refill  Medication carbidopa-levodopa (SINEMET CR)  MG CR tablet    Dose    Last refill ordered (m/d/y) 4/24/25   Last quantity ordered 30   Last # refills 2   Last clinic visit with ordering provider (m/d/y) 7/22/25   Next clinic visit with ordering provider (m/d/y) 10/28/25   All pertinent protocol data (lab date/result)    Pertinent information from patient's message        FOLLOW-UP      ADDITIONAL COMMENTS      MARK QUIROGA

## 2025-07-26 RX ORDER — CARBIDOPA AND LEVODOPA 50; 200 MG/1; MG/1
1 TABLET, EXTENDED RELEASE ORAL AT BEDTIME
Qty: 90 TABLET | Refills: 3 | Status: SHIPPED | OUTPATIENT
Start: 2025-07-26

## 2025-08-03 ASSESSMENT — ANXIETY QUESTIONNAIRES
3. WORRYING TOO MUCH ABOUT DIFFERENT THINGS: NOT AT ALL
4. TROUBLE RELAXING: NOT AT ALL
GAD7 TOTAL SCORE: 0
6. BECOMING EASILY ANNOYED OR IRRITABLE: NOT AT ALL
7. FEELING AFRAID AS IF SOMETHING AWFUL MIGHT HAPPEN: NOT AT ALL
2. NOT BEING ABLE TO STOP OR CONTROL WORRYING: NOT AT ALL
GAD7 TOTAL SCORE: 0
GAD7 TOTAL SCORE: 0
1. FEELING NERVOUS, ANXIOUS, OR ON EDGE: NOT AT ALL
5. BEING SO RESTLESS THAT IT IS HARD TO SIT STILL: NOT AT ALL
7. FEELING AFRAID AS IF SOMETHING AWFUL MIGHT HAPPEN: NOT AT ALL

## 2025-08-03 ASSESSMENT — PATIENT HEALTH QUESTIONNAIRE - PHQ9
10. IF YOU CHECKED OFF ANY PROBLEMS, HOW DIFFICULT HAVE THESE PROBLEMS MADE IT FOR YOU TO DO YOUR WORK, TAKE CARE OF THINGS AT HOME, OR GET ALONG WITH OTHER PEOPLE: SOMEWHAT DIFFICULT
SUM OF ALL RESPONSES TO PHQ QUESTIONS 1-9: 5
SUM OF ALL RESPONSES TO PHQ QUESTIONS 1-9: 5

## 2025-08-04 ENCOUNTER — OFFICE VISIT (OUTPATIENT)
Dept: PSYCHIATRY | Facility: CLINIC | Age: 65
End: 2025-08-04
Attending: PSYCHIATRY & NEUROLOGY
Payer: MEDICARE

## 2025-08-04 VITALS
RESPIRATION RATE: 16 BRPM | SYSTOLIC BLOOD PRESSURE: 107 MMHG | DIASTOLIC BLOOD PRESSURE: 50 MMHG | HEIGHT: 59 IN | HEART RATE: 72 BPM | BODY MASS INDEX: 24.39 KG/M2 | WEIGHT: 121 LBS | OXYGEN SATURATION: 97 %

## 2025-08-04 DIAGNOSIS — G20.B2 PARKINSON'S DISEASE WITH DYSKINESIA AND FLUCTUATING MANIFESTATIONS (H): ICD-10-CM

## 2025-08-04 DIAGNOSIS — F33.1 MODERATE EPISODE OF RECURRENT MAJOR DEPRESSIVE DISORDER (H): Primary | ICD-10-CM

## 2025-08-04 PROCEDURE — 3074F SYST BP LT 130 MM HG: CPT | Performed by: NURSE PRACTITIONER

## 2025-08-04 PROCEDURE — G2211 COMPLEX E/M VISIT ADD ON: HCPCS | Performed by: NURSE PRACTITIONER

## 2025-08-04 PROCEDURE — 3078F DIAST BP <80 MM HG: CPT | Performed by: NURSE PRACTITIONER

## 2025-08-04 PROCEDURE — 1126F AMNT PAIN NOTED NONE PRSNT: CPT | Performed by: NURSE PRACTITIONER

## 2025-08-04 PROCEDURE — 99215 OFFICE O/P EST HI 40 MIN: CPT | Performed by: NURSE PRACTITIONER

## 2025-08-04 ASSESSMENT — PAIN SCALES - GENERAL: PAINLEVEL_OUTOF10: NO PAIN (0)

## 2025-08-13 ENCOUNTER — OFFICE VISIT (OUTPATIENT)
Dept: UROLOGY | Facility: CLINIC | Age: 65
End: 2025-08-13
Payer: MEDICARE

## 2025-08-13 VITALS
OXYGEN SATURATION: 100 % | HEIGHT: 60 IN | BODY MASS INDEX: 23.16 KG/M2 | WEIGHT: 118 LBS | SYSTOLIC BLOOD PRESSURE: 129 MMHG | DIASTOLIC BLOOD PRESSURE: 78 MMHG | HEART RATE: 64 BPM

## 2025-08-13 DIAGNOSIS — N39.41 URGE INCONTINENCE OF URINE: Primary | ICD-10-CM

## 2025-08-13 PROCEDURE — 3074F SYST BP LT 130 MM HG: CPT | Performed by: UROLOGY

## 2025-08-13 PROCEDURE — 99213 OFFICE O/P EST LOW 20 MIN: CPT | Performed by: UROLOGY

## 2025-08-13 PROCEDURE — 3078F DIAST BP <80 MM HG: CPT | Performed by: UROLOGY

## 2025-08-13 RX ORDER — TROSPIUM CHLORIDE 20 MG/1
20 TABLET, FILM COATED ORAL
Qty: 60 TABLET | Refills: 3 | Status: SHIPPED | OUTPATIENT
Start: 2025-08-13